# Patient Record
Sex: FEMALE | Race: WHITE | NOT HISPANIC OR LATINO | Employment: FULL TIME | URBAN - METROPOLITAN AREA
[De-identification: names, ages, dates, MRNs, and addresses within clinical notes are randomized per-mention and may not be internally consistent; named-entity substitution may affect disease eponyms.]

---

## 2017-01-20 ENCOUNTER — OFFICE VISIT (OUTPATIENT)
Dept: RETAIL CLINIC | Facility: CLINIC | Age: 42
End: 2017-01-20

## 2017-01-20 VITALS
RESPIRATION RATE: 14 BRPM | TEMPERATURE: 98.1 F | SYSTOLIC BLOOD PRESSURE: 117 MMHG | HEART RATE: 88 BPM | DIASTOLIC BLOOD PRESSURE: 82 MMHG | OXYGEN SATURATION: 99 %

## 2017-01-20 DIAGNOSIS — M54.40 ACUTE RIGHT-SIDED LOW BACK PAIN WITH SCIATICA, SCIATICA LATERALITY UNSPECIFIED: Primary | ICD-10-CM

## 2017-01-20 DIAGNOSIS — M54.17 LUMBOSACRAL RADICULOPATHY: ICD-10-CM

## 2017-01-20 PROCEDURE — 96372 THER/PROPH/DIAG INJ SC/IM: CPT | Performed by: FAMILY MEDICINE

## 2017-01-20 PROCEDURE — 99214 OFFICE O/P EST MOD 30 MIN: CPT | Performed by: FAMILY MEDICINE

## 2017-01-20 RX ORDER — HYDROCODONE BITARTRATE AND ACETAMINOPHEN 5; 325 MG/1; MG/1
TABLET ORAL
Qty: 30 TABLET | Refills: 0
Start: 2017-01-20 | End: 2017-04-22 | Stop reason: HOSPADM

## 2017-01-20 RX ORDER — CYCLOBENZAPRINE HCL 10 MG
TABLET ORAL
Qty: 60 TABLET | Refills: 0 | Status: ON HOLD
Start: 2017-01-20 | End: 2017-04-21

## 2017-01-20 RX ORDER — KETOROLAC TROMETHAMINE 30 MG/ML
60 INJECTION, SOLUTION INTRAMUSCULAR; INTRAVENOUS ONCE
Status: COMPLETED | OUTPATIENT
Start: 2017-01-20 | End: 2017-01-20

## 2017-01-20 RX ORDER — METHYLPREDNISOLONE ACETATE 80 MG/ML
80 INJECTION, SUSPENSION INTRA-ARTICULAR; INTRALESIONAL; INTRAMUSCULAR; SOFT TISSUE ONCE
Status: COMPLETED | OUTPATIENT
Start: 2017-01-20 | End: 2017-01-20

## 2017-01-20 RX ORDER — PREDNISONE 10 MG/1
TABLET ORAL
Qty: 63 TABLET | Refills: 0
Start: 2017-01-20 | End: 2017-03-02

## 2017-01-20 RX ADMIN — METHYLPREDNISOLONE ACETATE 80 MG: 80 INJECTION, SUSPENSION INTRA-ARTICULAR; INTRALESIONAL; INTRAMUSCULAR; SOFT TISSUE at 12:35

## 2017-01-20 RX ADMIN — KETOROLAC TROMETHAMINE 60 MG: 30 INJECTION, SOLUTION INTRAMUSCULAR; INTRAVENOUS at 12:33

## 2017-01-20 NOTE — PROGRESS NOTES
Subjective   Oksana Pillai is a 41 y.o. female presents for   Chief Complaint   Patient presents with   • Back Pain     pt recieving PT with moinimal results PT refering back for possible neuro surgical consult   .     History of Present Illness    Oksana has been having several weeks of low back pain that radiates down into her R buttock and down R leg all the way to the R foot.  She has been going to PT twice weekly for a while now without significant improvement.  She has recently had a miscarriage as well.  She was seen for back pain when she was about 4-5 weeks pregnant, so little could be done as far as medication goes.  PT and tylenol was about the only option while she was pregnant.  At her 9 week appointment US with her OB, no heartbeat was seen and the fetus was measuring at 5 weeks.  That weekend, she did in fact, start bleeding.  Follow up revealed no fetus, but still lining, so she was given cytotec.  Her physical therapist recommended her following up here to see if there was some kind of alternate therapy that she could do now that she was no longer pregnant.  She's been having a lot of trouble at work.  Cannot get comfortable.  Sitting makes the pain worse.  Standing makes it better, but feels like she constantly needs to shift her weight because the numbness and tingling down her leg get worse if standing still.  Has to stand during meetings and has been standing while typing at work.  Is considering short term disability vs FMLA until she can feel better.     The following portions of the patient's history were reviewed and updated as appropriate: allergies, current medications, past family history, past medical history, past social history, past surgical history and problem list.    Review of Systems   HENT: Negative.    Eyes: Negative.    Respiratory: Negative.    Cardiovascular: Negative.    Gastrointestinal: Negative.    Endocrine: Negative.    Genitourinary: Negative.    Musculoskeletal: Positive  for back pain. Negative for arthralgias, gait problem, joint swelling, myalgias, neck pain and neck stiffness.   Skin: Negative.    Allergic/Immunologic: Negative.    Neurological: Positive for weakness and numbness. Negative for dizziness, tremors, seizures, syncope, facial asymmetry, speech difficulty, light-headedness and headaches.   Hematological: Negative.    Psychiatric/Behavioral: Negative.        Objective   Vitals:    01/20/17 1139   BP: 117/82   Pulse: 88   Resp: 14   Temp: 98.1 °F (36.7 °C)   TempSrc: Oral   SpO2: 99%     There is no height or weight on file to calculate BMI.    Physical Exam   Constitutional: She is oriented to person, place, and time. She appears well-developed and well-nourished. No distress.   Lying on stomach on the exam table upon entering the room.  States that it relieves the pressure on her back.     HENT:   Head: Normocephalic and atraumatic.   Eyes: Conjunctivae are normal. Pupils are equal, round, and reactive to light. No scleral icterus.   Neck: Normal range of motion. Neck supple. No thyromegaly present.   Musculoskeletal: She exhibits tenderness (low L/S spine).   Rest of ms exam normal     Lymphadenopathy:     She has no cervical adenopathy.   Neurological: She is alert and oriented to person, place, and time. No cranial nerve deficit. Coordination normal.   Decreased sensation down the lateral side of the R leg     Skin: Skin is warm and dry. No rash noted.   Psychiatric: She has a normal mood and affect.   Nursing note and vitals reviewed.      Assessment/Plan   Oksana was seen today for back pain.    Diagnoses and all orders for this visit:    Acute right-sided low back pain with sciatica, sciatica laterality unspecified  -     methylPREDNISolone acetate (DEPO-medrol) injection 80 mg; Inject 1 mL into the shoulder, thigh, or buttocks 1 (One) Time.  -     ketorolac (TORADOL) injection 60 mg; Inject 60 mg into the shoulder, thigh, or buttocks 1 (One) Time.    Lumbosacral  radiculopathy  -     methylPREDNISolone acetate (DEPO-medrol) injection 80 mg; Inject 1 mL into the shoulder, thigh, or buttocks 1 (One) Time.  -     ketorolac (TORADOL) injection 60 mg; Inject 60 mg into the shoulder, thigh, or buttocks 1 (One) Time.    Other orders  -     predniSONE (DELTASONE) 10 MG tablet; 6 x3, 5 x3, 4 x3, 3x3, 2x3, 1x3  -     HYDROcodone-acetaminophen (NORCO) 5-325 MG per tablet; 1 po q6hrs prn severe pain  -     cyclobenzaprine (FLEXERIL) 10 MG tablet; 1/2-1 po TID prn spasm   Continue with PT per their recommendations   If worsening or not any better by next week, then order MRI L/S spine

## 2017-01-27 ENCOUNTER — TELEPHONE (OUTPATIENT)
Dept: RETAIL CLINIC | Facility: CLINIC | Age: 42
End: 2017-01-27

## 2017-01-27 DIAGNOSIS — M54.17 LUMBOSACRAL RADICULOPATHY: ICD-10-CM

## 2017-01-27 DIAGNOSIS — M54.41 ACUTE BILATERAL LOW BACK PAIN WITH RIGHT-SIDED SCIATICA: Primary | ICD-10-CM

## 2017-01-27 NOTE — TELEPHONE ENCOUNTER
PT WANTED TO UPDATE YOU ON HER PAIN: HAVING A GREAT DEAL OF PAIN DOWN BACK OF LEG AND KNEE ALSO BURNING SENSATION ON LITTLE TOE.   STILL SEEING PT.   WHAT IS NEXT STEP?      MRI SCHEDULED AT [facility]/[phone]  Tuesday ,JANUARY 31 WITH 6:20 PM ARRIVAL, 6:50 PM TESTING.      PA [id number]  THRU 2/28/17    PT NOTIFIED OF ABOVE APPT. 1/30/17 10:00 AM. PT VOICED UNDERSTANDING.

## 2017-02-01 ENCOUNTER — TELEPHONE (OUTPATIENT)
Dept: RETAIL CLINIC | Facility: CLINIC | Age: 42
End: 2017-02-01

## 2017-02-01 NOTE — TELEPHONE ENCOUNTER
APPT SCHEDULED WITH [facility], GISSELLE TREJO  Thursday, MARCH 2, 2017, 10:15 arrival with 10:30 appt.  [facility]    WILL BE SEND NEW PT PACKET, FILL OUT AND SEND BACK PRIOR TO APPT.    PT. INFORMED OF ABOVE INFO. PT VOICED UNDERSTANDING.  PT REQUESTED INTO TO BE EMAILED  TO HER.

## 2017-02-20 ENCOUNTER — TELEPHONE (OUTPATIENT)
Dept: RETAIL CLINIC | Facility: CLINIC | Age: 42
End: 2017-02-20

## 2017-03-02 ENCOUNTER — OFFICE VISIT (OUTPATIENT)
Dept: NEUROSURGERY | Facility: CLINIC | Age: 42
End: 2017-03-02

## 2017-03-02 VITALS
WEIGHT: 204 LBS | BODY MASS INDEX: 30.92 KG/M2 | DIASTOLIC BLOOD PRESSURE: 80 MMHG | SYSTOLIC BLOOD PRESSURE: 122 MMHG | HEIGHT: 68 IN

## 2017-03-02 DIAGNOSIS — M51.17 INTERVERTEBRAL DISC DISORDER WITH RADICULOPATHY OF LUMBOSACRAL REGION: Primary | ICD-10-CM

## 2017-03-02 PROCEDURE — 99244 OFF/OP CNSLTJ NEW/EST MOD 40: CPT | Performed by: NURSE PRACTITIONER

## 2017-03-02 NOTE — PROGRESS NOTES
"Subjective   Patient ID: Oksana Pillai is a 41 y.o. female is being seen for consultation today at the request of Dede Redd,* for low back pain that radiates into right leg. She also complains of numbness in the lower half of her right leg. She is currently taking Flexeril 10 PRN and Norco 5-325 mg PRN for pain.    Back Pain   This is a new problem. The current episode started more than 1 month ago (Pain started on December 10th ). The problem occurs intermittently. The problem has been gradually improving since onset. The pain is present in the lumbar spine and gluteal. The quality of the pain is described as aching and shooting. The pain radiates to the right knee, right thigh and right foot (Sensation of walking on a \"balled up sock\" undeneath right toes). The pain is at a severity of 3/10. The pain is mild. Worse during: depends on daily activity. The symptoms are aggravated by sitting, bending and standing. Associated symptoms include leg pain, numbness (R lateral calf) and weakness (dragging right foot). Pertinent negatives include no bowel incontinence. Bladder incontinence: Described as urgency and dribbling if waiting too long to urinate. She has tried muscle relaxant (PT, oral narcotics, steroids, dry needling) for the symptoms. The treatment provided moderate relief.       The following portions of the patient's history were reviewed and updated as appropriate: allergies, current medications, past family history, past medical history, past social history, past surgical history and problem list.    Review of Systems   Constitutional: Positive for activity change.   Gastrointestinal: Negative for bowel incontinence.   Genitourinary: Bladder incontinence: Described as urgency and dribbling if waiting too long to urinate.   Musculoskeletal: Positive for arthralgias and back pain.   Neurological: Positive for weakness (dragging right foot) and numbness (R lateral calf).   All other systems reviewed " and are negative.      Objective   Physical Exam   Constitutional: She is oriented to person, place, and time. She appears well-developed and well-nourished. She is cooperative. No distress.   HENT:   Head: Normocephalic and atraumatic.   Eyes: Conjunctivae are normal.   Neck: Normal range of motion. Neck supple.   Cardiovascular: Normal rate.    Pulmonary/Chest: Effort normal. No respiratory distress.   Abdominal: Soft. She exhibits no distension. There is no tenderness.   Musculoskeletal: Normal range of motion. She exhibits no edema.   Neurological: She is alert and oriented to person, place, and time. A sensory deficit (Increased sensitivity to light touch in the R calf.) is present. Gait normal. Coordination (Had difficulty with toe walking on the right) abnormal. GCS eye subscore is 4. GCS verbal subscore is 5. GCS motor subscore is 6.   Reflex Scores:       Tricep reflexes are 2+ on the right side and 2+ on the left side.       Bicep reflexes are 2+ on the right side and 2+ on the left side.       Brachioradialis reflexes are 2+ on the right side and 2+ on the left side.       Patellar reflexes are 2+ on the right side and 4+ on the left side.       Achilles reflexes are 0 on the right side and 2+ on the left side.  Increased tingling sensation with light touch in the R calf. Unable to toe walk on the right. SLR + on the right at 15 degrees. Negative Gibbs's; negative clonus.   Skin: Skin is warm and dry. No rash noted. She is not diaphoretic.   Psychiatric: She has a normal mood and affect. Her speech is normal. Thought content normal.   Vitals reviewed.    Neurologic Exam     Mental Status   Oriented to person, place, and time.   Speech: speech is normal   Level of consciousness: alert    Motor Exam   Muscle bulk: normal  Overall muscle tone: normal    Strength   Strength 5/5 except as noted.   Right strength: 5-/5 strength in the right gastroc.     Sensory Exam   Right arm light touch: normal  Left arm  light touch: normal  Right leg light touch: Increased sensitivity to ligth touch in the right calf.  Left leg light touch: normal    Gait, Coordination, and Reflexes     Gait  Gait: normal    Reflexes   Right brachioradialis: 2+  Left brachioradialis: 2+  Right biceps: 2+  Left biceps: 2+  Right triceps: 2+  Left triceps: 2+  Right patellar: 2+  Left patellar: 4+  Right achilles: 0  Left achilles: 2+  Right Gibbs: absent  Left Gibbs: absent  Right ankle clonus: absent  Left ankle clonus: absent       + SLR on the right at 15 degrees       Assessment/Plan   Independent Review of Radiographic Studies:      The MRI images of the lumbar spine performed without contrast January 31, 2017 today in the office with both patient and Dr. Montana.  The MRI shows a right paracentral disc extrusion at L5-S1 with severe right lateral recess stenosis and displacement of the right S1 nerve root.  The disc herniation measures 9010 mm in length and 5-6 mm in width.    Medical Decision Making:       Ms. Pillai was seen today for a neurosurgical opinion at the request of Dr. Dede Redd.  Notes from today's visit will be forwarded upon completion.  Ms. Pillai began having the above symptoms December 10 of 2016.  The pain was severe and incapacitating at that time.  The pain involves the right low back just above the right buttock, the right buttock, right posterior thigh, right posterior lateral calf.  She was practically immobile during that time.  She has been undergoing physical therapy, dry needling and has noticed some improvement however she continues to have shooting pain on occasion in the right leg, paresthesias and numbness in the right calf, and weakness in the right leg.  She is also noticed some difficulty with bladder elimination.  She states that if she waits too long to urinate she can become in incontinence with dribbling.  She is to have severe pain with sitting on the toilet but that has gotten better.    I  reviewed the above images, the history and exam findings with Dr. Montana.  He has also spoken with the patient about the imaging and exam findings.    Dr. Montana explained that the disc herniation is quite sizable and certainly explains the history of pain in the right posterior thigh and calf.  After a course of time, usually the pain will diminish but weakness can persist and become permanent.  She does have weakness on today's exam.  Dr. Montana explained that a prolonged course of nerve compression can lead to irreversible weakness in the right leg.  The patient voiced understanding.    After Dr. Montana left the room, the patient expressed to me that her intention was to have Dr. Ojeda as her surgeon if it should be required.  She also expressed desire to proceed with at least one lumbar epidural steroid injection to see if her symptoms improve.  We will arrange for the epidural and a follow-up with Dr. Ojeda.  She has any worsening pain or worsening weakness in her lower extremities, urinary or bowell incontinence she was instructed to call the office.    Oksana was seen today for back pain.    Diagnoses and all orders for this visit:    Intervertebral disc disorder with radiculopathy of lumbosacral region  -     Epidural Block      Return in about 3 weeks (around 3/23/2017).

## 2017-03-08 ENCOUNTER — TELEPHONE (OUTPATIENT)
Dept: NEUROSURGERY | Facility: CLINIC | Age: 42
End: 2017-03-08

## 2017-03-08 ENCOUNTER — OFFICE VISIT (OUTPATIENT)
Dept: NEUROSURGERY | Facility: CLINIC | Age: 42
End: 2017-03-08

## 2017-03-08 VITALS
DIASTOLIC BLOOD PRESSURE: 82 MMHG | SYSTOLIC BLOOD PRESSURE: 117 MMHG | WEIGHT: 204 LBS | BODY MASS INDEX: 30.92 KG/M2 | HEIGHT: 68 IN | HEART RATE: 70 BPM

## 2017-03-08 DIAGNOSIS — M51.17 INTERVERTEBRAL DISC DISORDER WITH RADICULOPATHY OF LUMBOSACRAL REGION: Primary | ICD-10-CM

## 2017-03-08 PROCEDURE — 99213 OFFICE O/P EST LOW 20 MIN: CPT | Performed by: NEUROLOGICAL SURGERY

## 2017-03-08 RX ORDER — GABAPENTIN 100 MG/1
100 CAPSULE ORAL 2 TIMES DAILY
Qty: 60 CAPSULE | Refills: 3 | Status: SHIPPED | OUTPATIENT
Start: 2017-03-08 | End: 2017-05-05 | Stop reason: SDUPTHER

## 2017-03-08 NOTE — PATIENT INSTRUCTIONS
Obesity  Obesity is defined as having too much total body fat and a body mass index (BMI) of 30 or more. BMI is an estimate of body fat and is calculated from your height and weight. BMI is typically calculated by your health care provider during regular wellness visits. Obesity happens when you consume more calories than you can burn by exercising or performing daily physical tasks. Prolonged obesity can cause major illnesses or emergencies, such as:  · Stroke.  · Heart disease.  · Diabetes.  · Cancer.  · Arthritis.  · High blood pressure (hypertension).  · High cholesterol.  · Sleep apnea.  · Erectile dysfunction.  · Infertility problems.  CAUSES   · Regularly eating unhealthy foods.  · Physical inactivity.  · Certain disorders, such as an underactive thyroid (hypothyroidism), Cushing's syndrome, and polycystic ovarian syndrome.  · Certain medicines, such as steroids, some depression medicines, and antipsychotics.  · Genetics.  · Lack of sleep.  DIAGNOSIS  A health care provider can diagnose obesity after calculating your BMI. Obesity will be diagnosed if your BMI is 30 or higher.  There are other methods of measuring obesity levels. Some other methods include measuring your skinfold thickness, your waist circumference, and comparing your hip circumference to your waist circumference.  TREATMENT   A healthy treatment program includes some or all of the following:  · Long-term dietary changes.  · Exercise and physical activity.  · Behavioral and lifestyle changes.  · Medicine only under the supervision of your health care provider. Medicines may help, but only if they are used with diet and exercise programs.  If your BMI is 40 or higher, your health care provider may recommend specialized surgery or programs to help with weight loss.  An unhealthy treatment program includes:  · Fasting.  · Fad diets.  · Supplements and drugs.  These choices do not succeed in long-term weight control.  HOME CARE  INSTRUCTIONS  · Exercise and perform physical activity as directed by your health care provider. To increase physical activity, try the following:    Use stairs instead of elevators.    Park farther away from store entrances.    Garden, bike, or walk instead of watching television or using the computer.  · Eat healthy, low-calorie foods and drinks on a regular basis. Eat more fruits and vegetables. Use low-calorie cookbooks or take healthy cooking classes.  · Limit fast food, sweets, and processed snack foods.  · Eat smaller portions.  · Keep a daily journal of everything you eat. There are many free websites to help you with this. It may be helpful to measure your foods so you can determine if you are eating the correct portion sizes.  · Avoid drinking alcohol. Drink more water and drinks without calories.  · Take vitamins and supplements only as recommended by your health care provider.  · Weight-loss support groups, registered dietitians, counselors, and stress reduction education can also be very helpful.  SEEK IMMEDIATE MEDICAL CARE IF:  · You have chest pain or tightness.  · You have trouble breathing or feel short of breath.  · You have weakness or leg numbness.  · You feel confused or have trouble talking.  · You have sudden changes in your vision.     This information is not intended to replace advice given to you by your health care provider. Make sure you discuss any questions you have with your health care provider.     Document Released: 01/25/2006 Document Revised: 01/08/2016 Document Reviewed: 10/05/2016  [employer] Interactive Patient Education ©2016 [employer]

## 2017-03-08 NOTE — PROGRESS NOTES
Subjective   Patient ID: Oksana Pillai is a 41 y.o. female is here today for follow-up of back pain.    At the patient's last visit, she was encouraged to proceed with her third MITCH and followup today with Dr. Ojeda.    Today, the patient notes that she was originally scheduled for MTICH today, but wanted to discuss MITCH with Dr. Ojeda.    The patient notes that her pain symptoms are related to her movements and activity.  She notes that some movements in PT will increase.  She notes persistent numbness in her right toes.    Back Pain   This is a chronic problem. The current episode started more than 1 month ago. The problem occurs daily. The problem has been gradually improving since onset. Pertinent negatives include no fever.       The following portions of the patient's history were reviewed and updated as appropriate: allergies, current medications, past family history, past medical history, past social history, past surgical history and problem list.    Review of Systems   Constitutional: Negative for fever.   Musculoskeletal: Positive for back pain.   Neurological: Negative for syncope.   All other systems reviewed and are negative.    The patient is with her .  She is generally healthy.  She's had mild off and on low back pain over the last year or 2.  About 3 or 4 months ago she began developing some severe right buttock and leg pain that was aggravated by prolonged sitting.  In a car.  She is known to have a right L5-S1 herniated disc which is quite large.  She has gone through therapy and there has been some improvement in the pain.  But she does have some slight weakness in the right gastrocnemius 4+ over 5.  It has not worsened since she noticed it.  She was supposed to have a epidural block today but canceled that.  We talked about her options.  She was seen by my partner a few weeks ago and surgery was discussed.  I think it's possible that she may end up needing it but the weakness has not  been going on for that long and I think it's reasonable to give it a little bit more time, at least another 4 weeks.  If there is no change in the weakness, even if the pain is better, then a right L5-S1 microdiscectomy would probably be the best option.  But she wanted to try and give as much time an opportunity to see if this can be managed nonoperatively and I think that's reasonable.  Since her pain is better, we'll hold off on the blocks.  We'll renew her therapy and try gabapentin and see her in one month's.    Objective   Physical Exam   Constitutional: She is oriented to person, place, and time. She appears well-developed and well-nourished.   HENT:   Head: Normocephalic and atraumatic.   Eyes: Conjunctivae and EOM are normal. Pupils are equal, round, and reactive to light.   Fundoscopic exam:       The right eye shows no papilledema. The right eye shows venous pulsations.        The left eye shows no papilledema. The left eye shows venous pulsations.   Neck: Carotid bruit is not present.   Neurological: She is oriented to person, place, and time. She has a normal Finger-Nose-Finger Test and a normal Heel to Shin Test. Gait normal.   Reflex Scores:       Tricep reflexes are 2+ on the right side and 2+ on the left side.       Bicep reflexes are 2+ on the right side and 2+ on the left side.       Brachioradialis reflexes are 2+ on the right side and 2+ on the left side.       Patellar reflexes are 2+ on the right side and 2+ on the left side.       Achilles reflexes are 2+ on the right side and 2+ on the left side.  Psychiatric: Her speech is normal.     Neurologic Exam     Mental Status   Oriented to person, place, and time.   Registration of memory: Good recent and remote memory.   Attention: normal. Concentration: normal.   Speech: speech is normal   Level of consciousness: alert  Knowledge: consistent with education.     Cranial Nerves     CN II   Visual fields full to confrontation.   Visual acuity:  normal    CN III, IV, VI   Pupils are equal, round, and reactive to light.  Extraocular motions are normal.     CN V   Facial sensation intact.   Right corneal reflex: normal  Left corneal reflex: normal    CN VII   Facial expression full, symmetric.   Right facial weakness: none  Left facial weakness: none    CN VIII   Hearing: intact    CN IX, X   Palate: symmetric    CN XI   Right sternocleidomastoid strength: normal  Left sternocleidomastoid strength: normal    CN XII   Tongue: not atrophic  Tongue deviation: none    Motor Exam   Muscle bulk: normal  Right arm tone: normal  Left arm tone: normal  Right leg tone: normal  Left leg tone: normal    Strength   Strength 5/5 except as noted.   Right strength: 4+/5 strength in the right gastrocnemius       Positive straight leg raising sign on the right     Sensory Exam   Light touch normal.     Gait, Coordination, and Reflexes     Gait  Gait: normal    Coordination   Finger to nose coordination: normal  Heel to shin coordination: normal    Reflexes   Right brachioradialis: 2+  Left brachioradialis: 2+  Right biceps: 2+  Left biceps: 2+  Right triceps: 2+  Left triceps: 2+  Right patellar: 2+  Left patellar: 2+  Right achilles: 2+  Left achilles: 2+  Right : 2+  Left : 2+      Assessment/Plan   Independent Review of Radiographic Studies:    I reviewed the MRI done to/1/17 which does show a right L5-S1 herniated disc with root compression quite large.  I agree with the report.      Medical Decision Making:    Her pain is better.  Her weakness is slight.  I do share of the  concern that she might go on to need surgery, but I don't think it's unreasonable to give it just a little bit more time.  We'll continue therapy and start gabapentin at 100 mg twice a day and regroup in one month.  If there is no sign of improvement, I would move forward with a right L5-S1 microdiscectomy.  If there is, there would be the option to continue nonoperative treatment.      Oksana  was seen today for back pain.    Diagnoses and all orders for this visit:    Intervertebral disc disorder with radiculopathy of lumbosacral region  -     Ambulatory Referral to Physical Therapy Evaluate and treat    Other orders  -     gabapentin (NEURONTIN) 100 MG capsule; Take 1 capsule by mouth 2 (Two) Times a Day.    Return in about 4 weeks (around 4/5/2017).    Body mass index is 31.02 kg/(m^2).

## 2017-03-09 ENCOUNTER — TELEPHONE (OUTPATIENT)
Dept: NEUROSURGERY | Facility: CLINIC | Age: 42
End: 2017-03-09

## 2017-03-24 ENCOUNTER — TELEPHONE (OUTPATIENT)
Dept: NEUROSURGERY | Facility: CLINIC | Age: 42
End: 2017-03-24

## 2017-04-05 ENCOUNTER — OFFICE VISIT (OUTPATIENT)
Dept: NEUROSURGERY | Facility: CLINIC | Age: 42
End: 2017-04-05

## 2017-04-05 VITALS
HEIGHT: 68 IN | BODY MASS INDEX: 30.92 KG/M2 | WEIGHT: 204 LBS | DIASTOLIC BLOOD PRESSURE: 79 MMHG | HEART RATE: 76 BPM | SYSTOLIC BLOOD PRESSURE: 116 MMHG

## 2017-04-05 DIAGNOSIS — M51.17 INTERVERTEBRAL DISC DISORDER WITH RADICULOPATHY OF LUMBOSACRAL REGION: Primary | ICD-10-CM

## 2017-04-05 DIAGNOSIS — M62.81 CALF MUSCLE WEAKNESS: ICD-10-CM

## 2017-04-05 PROCEDURE — 99214 OFFICE O/P EST MOD 30 MIN: CPT | Performed by: NEUROLOGICAL SURGERY

## 2017-04-05 NOTE — PROGRESS NOTES
Subjective   Patient ID: Oksana Pillai is a 41 y.o. female is here today for follow-up of back pain.    At the patient's last visit, she was encouraged to continue her therapy and gabapentin at 100 mg BID.    Today, the patient notes that she continues in therapy once a week.  She notes that she continues on the gabapentin 100 mg BID.  She notes that she cannot tell much of a change in her symptoms.  She notes that she still has trouble with activity.    She notes that when she started the gabapentin, she noticed increased headaches, but these eventually resolved.     Back Pain   This is a chronic problem. The current episode started more than 1 month ago. The problem occurs daily. The problem has been gradually improving since onset. Pertinent negatives include no fever.       The following portions of the patient's history were reviewed and updated as appropriate: allergies, current medications, past family history, past medical history, past social history, past surgical history and problem list.    Review of Systems   Constitutional: Negative for fever.   Musculoskeletal: Positive for back pain.   Neurological: Negative for syncope.   All other systems reviewed and are negative.    He is with her .  PT and gabapentin really did not help.  The weakness has not improved.  She has a fairly substantial right L5-S1 disc.  Probably a free fragment.  This is been going on now for about 3-1/2 almost 4 months.  I think it's time to go ahead and move forward with a right L5-S1 microdiscectomy.  We discussed this at length and she wants to move forward with it.  She still has about 4 out of 5 Strength on the right.    Objective   Physical Exam   Constitutional: She is oriented to person, place, and time. She appears well-developed and well-nourished.   HENT:   Head: Normocephalic and atraumatic.   Eyes: Conjunctivae and EOM are normal. Pupils are equal, round, and reactive to light.   Fundoscopic exam:       The right  eye shows no papilledema. The right eye shows venous pulsations.        The left eye shows no papilledema. The left eye shows venous pulsations.   Neck: Carotid bruit is not present.   Neurological: She is oriented to person, place, and time. She has a normal Finger-Nose-Finger Test and a normal Heel to Shin Test. Gait normal.   Reflex Scores:       Tricep reflexes are 2+ on the right side and 2+ on the left side.       Bicep reflexes are 2+ on the right side and 2+ on the left side.       Brachioradialis reflexes are 2+ on the right side and 2+ on the left side.       Patellar reflexes are 2+ on the right side and 2+ on the left side.       Achilles reflexes are 2+ on the right side and 2+ on the left side.  Psychiatric: Her speech is normal.     Neurologic Exam     Mental Status   Oriented to person, place, and time.   Registration of memory: Good recent and remote memory.   Attention: normal. Concentration: normal.   Speech: speech is normal   Level of consciousness: alert  Knowledge: consistent with education.     Cranial Nerves     CN II   Visual fields full to confrontation.   Visual acuity: normal    CN III, IV, VI   Pupils are equal, round, and reactive to light.  Extraocular motions are normal.     CN V   Facial sensation intact.   Right corneal reflex: normal  Left corneal reflex: normal    CN VII   Facial expression full, symmetric.   Right facial weakness: none  Left facial weakness: none    CN VIII   Hearing: intact    CN IX, X   Palate: symmetric    CN XI   Right sternocleidomastoid strength: normal  Left sternocleidomastoid strength: normal    CN XII   Tongue: not atrophic  Tongue deviation: none    Motor Exam   Muscle bulk: normal  Right arm tone: normal  Left arm tone: normal  Right leg tone: normal  Left leg tone: normal    Strength   Strength 5/5 except as noted.   Right gastroc: 4/5    Sensory Exam   Light touch normal.     Gait, Coordination, and Reflexes     Gait  Gait: normal    Coordination    Finger to nose coordination: normal  Heel to shin coordination: normal    Reflexes   Right brachioradialis: 2+  Left brachioradialis: 2+  Right biceps: 2+  Left biceps: 2+  Right triceps: 2+  Left triceps: 2+  Right patellar: 2+  Left patellar: 2+  Right achilles: 2+  Left achilles: 2+  Right : 2+  Left : 2+      Assessment/Plan   Independent Review of Radiographic Studies:    I reviewed the MRI again which showed a fairly substantial right L5-S1 herniated disc.      Medical Decision Making:    I described and recommended an outpatient right L5/S1 Metrx microdiscectomy. The goal of surgery is relief of radiating leg pain, and improvement of numbness, tingling, and weakness. This will not help or hinder midline low back pain. The risks include, but are not limited to, infection, hemorrhage requiring transfusion or reoperation, CSF leak requiring reoperation, incomplete relief of symptoms, potential need for additional surgery in the future, stroke, paralysis, coma, and death. The patient agrees to proceed.     Oksana was seen today for back pain.    Diagnoses and all orders for this visit:    Intervertebral disc disorder with radiculopathy of lumbosacral region  -     Case request; Standing  -     Case request    Calf muscle weakness  -     Case request; Standing  -     Case request    No Follow-up on file.

## 2017-04-06 ENCOUNTER — TRANSCRIBE ORDERS (OUTPATIENT)
Dept: NEUROSURGERY | Facility: CLINIC | Age: 42
End: 2017-04-06

## 2017-04-14 ENCOUNTER — APPOINTMENT (OUTPATIENT)
Dept: PREADMISSION TESTING | Facility: HOSPITAL | Age: 42
End: 2017-04-14

## 2017-04-14 VITALS
OXYGEN SATURATION: 100 % | DIASTOLIC BLOOD PRESSURE: 78 MMHG | WEIGHT: 205 LBS | BODY MASS INDEX: 32.18 KG/M2 | SYSTOLIC BLOOD PRESSURE: 133 MMHG | HEIGHT: 67 IN | RESPIRATION RATE: 18 BRPM | HEART RATE: 73 BPM | TEMPERATURE: 98.1 F

## 2017-04-14 LAB
ANION GAP SERPL CALCULATED.3IONS-SCNC: 13.1 MMOL/L
APTT PPP: 26.9 SECONDS (ref 22.7–35.4)
BACTERIA UR QL AUTO: NORMAL /HPF
BASOPHILS # BLD AUTO: 0.01 10*3/MM3 (ref 0–0.2)
BASOPHILS NFR BLD AUTO: 0.2 % (ref 0–1.5)
BILIRUB UR QL STRIP: NEGATIVE
BUN BLD-MCNC: 16 MG/DL (ref 6–20)
BUN/CREAT SERPL: 16.7 (ref 7–25)
CALCIUM SPEC-SCNC: 9.4 MG/DL (ref 8.6–10.5)
CHLORIDE SERPL-SCNC: 103 MMOL/L (ref 98–107)
CLARITY UR: CLEAR
CO2 SERPL-SCNC: 26.9 MMOL/L (ref 22–29)
COLOR UR: YELLOW
CREAT BLD-MCNC: 0.96 MG/DL (ref 0.57–1)
DEPRECATED RDW RBC AUTO: 42.4 FL (ref 37–54)
EOSINOPHIL # BLD AUTO: 0.08 10*3/MM3 (ref 0–0.7)
EOSINOPHIL NFR BLD AUTO: 1.3 % (ref 0.3–6.2)
ERYTHROCYTE [DISTWIDTH] IN BLOOD BY AUTOMATED COUNT: 12.4 % (ref 11.7–13)
GFR SERPL CREATININE-BSD FRML MDRD: 65 ML/MIN/1.73
GLUCOSE BLD-MCNC: 85 MG/DL (ref 65–99)
GLUCOSE UR STRIP-MCNC: NEGATIVE MG/DL
HCT VFR BLD AUTO: 41.9 % (ref 35.6–45.5)
HGB BLD-MCNC: 14 G/DL (ref 11.9–15.5)
HGB UR QL STRIP.AUTO: NEGATIVE
HYALINE CASTS UR QL AUTO: NORMAL /LPF
IMM GRANULOCYTES # BLD: 0 10*3/MM3 (ref 0–0.03)
IMM GRANULOCYTES NFR BLD: 0 % (ref 0–0.5)
INR PPP: 0.98 (ref 0.9–1.1)
KETONES UR QL STRIP: NEGATIVE
LEUKOCYTE ESTERASE UR QL STRIP.AUTO: NEGATIVE
LYMPHOCYTES # BLD AUTO: 1.98 10*3/MM3 (ref 0.9–4.8)
LYMPHOCYTES NFR BLD AUTO: 31.5 % (ref 19.6–45.3)
MCH RBC QN AUTO: 31.4 PG (ref 26.9–32)
MCHC RBC AUTO-ENTMCNC: 33.4 G/DL (ref 32.4–36.3)
MCV RBC AUTO: 93.9 FL (ref 80.5–98.2)
MONOCYTES # BLD AUTO: 0.43 10*3/MM3 (ref 0.2–1.2)
MONOCYTES NFR BLD AUTO: 6.8 % (ref 5–12)
NEUTROPHILS # BLD AUTO: 3.78 10*3/MM3 (ref 1.9–8.1)
NEUTROPHILS NFR BLD AUTO: 60.2 % (ref 42.7–76)
NITRITE UR QL STRIP: NEGATIVE
PH UR STRIP.AUTO: 5.5 [PH] (ref 5–8)
PLATELET # BLD AUTO: 200 10*3/MM3 (ref 140–500)
PMV BLD AUTO: 11.8 FL (ref 6–12)
POTASSIUM BLD-SCNC: 4.1 MMOL/L (ref 3.5–5.2)
PROT UR QL STRIP: NEGATIVE
PROTHROMBIN TIME: 12.6 SECONDS (ref 11.7–14.2)
RBC # BLD AUTO: 4.46 10*6/MM3 (ref 3.9–5.2)
RBC # UR: NORMAL /HPF
REF LAB TEST METHOD: NORMAL
SODIUM BLD-SCNC: 143 MMOL/L (ref 136–145)
SP GR UR STRIP: 1.02 (ref 1–1.03)
SQUAMOUS #/AREA URNS HPF: NORMAL /HPF
UROBILINOGEN UR QL STRIP: NORMAL
WBC NRBC COR # BLD: 6.28 10*3/MM3 (ref 4.5–10.7)
WBC UR QL AUTO: NORMAL /HPF

## 2017-04-14 PROCEDURE — 36415 COLL VENOUS BLD VENIPUNCTURE: CPT

## 2017-04-14 PROCEDURE — 80048 BASIC METABOLIC PNL TOTAL CA: CPT

## 2017-04-14 PROCEDURE — 85610 PROTHROMBIN TIME: CPT

## 2017-04-14 PROCEDURE — 85730 THROMBOPLASTIN TIME PARTIAL: CPT

## 2017-04-14 PROCEDURE — 81001 URINALYSIS AUTO W/SCOPE: CPT

## 2017-04-14 PROCEDURE — 85025 COMPLETE CBC W/AUTO DIFF WBC: CPT

## 2017-04-14 NOTE — DISCHARGE INSTRUCTIONS
Take the following medications the morning of surgery with a small sip of water:        General Instructions:  • Do not eat solid food after midnight the night before surgery.  • You may drink clear liquids day of surgery but must stop at least one hour before your hospital arrival time.  • It is beneficial for you to have a clear drink that contains carbohydrates the day of surgery.  We suggest a 20 ounce bottle of Gatorade or Powerade for non-diabetic patients or a 20 ounce bottle of G2 or Powerade Zero for diabetic patients. (Pediatric patients, are not advised to drink a 20 ounce carbohydrate drink)    Clear liquids are liquids you can see through.  Nothing red in color.     Plain water                               Sports drinks  Sodas                                   Gelatin (Jell-O)  Fruit juices without pulp such as white grape juice and apple juice  Popsicles that contain no fruit or yogurt  Tea or coffee (no cream or milk added)  Gatorade / Powerade  G2 / Powerade Zero    • Infants may have breast milk up to four hours before surgery.  • Infants drinking formula may drink formula up to six hours before surgery.   • Patients who avoid smoking, chewing tobacco and alcohol for 4 weeks prior to surgery have a reduced risk of post-operative complications.  Quit smoking as many days before surgery as you can.  • Do not smoke, use chewing tobacco or drink alcohol the day of surgery.   • If applicable bring your C-PAP/ BI-PAP machine.  • Bring any papers given to you in the doctor’s office.  • Wear clean comfortable clothes and socks.  • Do not wear contact lenses or make-up.  Bring a case for your glasses.   • Bring crutches or walker if applicable.  • Leave all other valuables and jewelry at home.  • The Pre-Admission Testing nurse will instruct you to bring medications if unable to obtain an accurate list in Pre-Admission Testing.        If you were given a blood bank ID arm band remember to bring it with you  the day of surgery.    Preventing a Surgical Site Infection:  • For 2 to 3 days before surgery, avoid shaving with a razor because the razor can irritate skin and make it easier to develop an infection.  • The night prior to surgery sleep in a clean bed with clean clothing.  Do not allow pets to sleep with you.  • Shower on the morning of surgery using a fresh bar of anti-bacterial soap (such as Dial) and clean washcloth.  Dry with a clean towel and dress in clean clothing.  • Ask your surgeon if you will be receiving antibiotics prior to surgery.  • Make sure you, your family, and all healthcare providers clean their hands with soap and water or an alcohol based hand  before caring for you or your wound.    Day of surgery:  Upon arrival, a Pre-op nurse and Anesthesiologist will review your health history, obtain vital signs, and answer questions you may have.  The only belongings needed at this time will be your home medications and if applicable your C-PAP/BI-PAP machine.  If you are staying overnight your family can leave the rest of your belongings in the car and bring them to your room later.  A Pre-op nurse will start an IV and you may receive medication in preparation for surgery, including something to help you relax.  Your family will be able to see you in the Pre-op area.  While you are in surgery your family should notify the waiting room  if they leave the waiting room area and provide a contact phone number.    Please be aware that surgery does come with discomfort.  We want to make every effort to control your discomfort so please discuss any uncontrolled symptoms with your nurse.   Your doctor will most likely have prescribed pain medications.      If you are going home after surgery you will receive individualized written care instructions before being discharged.  A responsible adult must drive you to and from the hospital on the day of your surgery and stay with you for 24  hours.    If you are staying overnight following surgery, you will be transported to your hospital room following the recovery period.  [facility] has all private rooms.    If you have any questions please call Pre-Admission Testing at [phone].  Deductibles and co-payments are collected on the day of service. Please be prepared to pay the required co-pay, deductible or deposit on the day of service as defined by your plan.

## 2017-04-21 ENCOUNTER — ANESTHESIA (OUTPATIENT)
Dept: PERIOP | Facility: HOSPITAL | Age: 42
End: 2017-04-21

## 2017-04-21 ENCOUNTER — APPOINTMENT (OUTPATIENT)
Dept: GENERAL RADIOLOGY | Facility: HOSPITAL | Age: 42
End: 2017-04-21

## 2017-04-21 ENCOUNTER — HOSPITAL ENCOUNTER (OUTPATIENT)
Facility: HOSPITAL | Age: 42
Setting detail: OBSERVATION
Discharge: HOME | End: 2017-04-22
Attending: NEUROLOGICAL SURGERY | Admitting: NEUROLOGICAL SURGERY

## 2017-04-21 ENCOUNTER — ANESTHESIA EVENT (OUTPATIENT)
Dept: PERIOP | Facility: HOSPITAL | Age: 42
End: 2017-04-21

## 2017-04-21 PROBLEM — Z09 SURGERY FOLLOW-UP EXAMINATION: Status: ACTIVE | Noted: 2017-04-21

## 2017-04-21 LAB
B-HCG UR QL: NEGATIVE
INTERNAL NEGATIVE CONTROL: NEGATIVE
INTERNAL POSITIVE CONTROL: POSITIVE
Lab: NORMAL

## 2017-04-21 PROCEDURE — G0378 HOSPITAL OBSERVATION PER HR: HCPCS

## 2017-04-21 PROCEDURE — 63030 LAMOT DCMPRN NRV RT 1 LMBR: CPT | Performed by: NEUROLOGICAL SURGERY

## 2017-04-21 PROCEDURE — 25010000002 NEOSTIGMINE 10 MG/10ML SOLUTION: Performed by: NURSE ANESTHETIST, CERTIFIED REGISTERED

## 2017-04-21 PROCEDURE — 25010000002 METHOCARBAMOL 1000 MG/10ML SOLUTION 10 ML VIAL: Performed by: NEUROLOGICAL SURGERY

## 2017-04-21 PROCEDURE — 25010000002 FENTANYL CITRATE (PF) 100 MCG/2ML SOLUTION: Performed by: NURSE ANESTHETIST, CERTIFIED REGISTERED

## 2017-04-21 PROCEDURE — 25010000002 PROPOFOL 10 MG/ML EMULSION: Performed by: NURSE ANESTHETIST, CERTIFIED REGISTERED

## 2017-04-21 PROCEDURE — 25010000003 CEFAZOLIN IN DEXTROSE 2-4 GM/100ML-% SOLUTION: Performed by: NEUROLOGICAL SURGERY

## 2017-04-21 PROCEDURE — 25010000002 DEXAMETHASONE PER 1 MG: Performed by: NURSE ANESTHETIST, CERTIFIED REGISTERED

## 2017-04-21 PROCEDURE — 25010000002 ONDANSETRON PER 1 MG: Performed by: NURSE ANESTHETIST, CERTIFIED REGISTERED

## 2017-04-21 PROCEDURE — 25010000002 HYDROMORPHONE PER 4 MG: Performed by: NURSE ANESTHETIST, CERTIFIED REGISTERED

## 2017-04-21 PROCEDURE — 25010000002 MIDAZOLAM PER 1 MG: Performed by: ANESTHESIOLOGY

## 2017-04-21 PROCEDURE — 25010000002 METHYLPREDNISOLONE PER 80 MG: Performed by: NEUROLOGICAL SURGERY

## 2017-04-21 PROCEDURE — 25010000002 PHENYLEPHRINE PER 1 ML: Performed by: NURSE ANESTHETIST, CERTIFIED REGISTERED

## 2017-04-21 PROCEDURE — 99024 POSTOP FOLLOW-UP VISIT: CPT | Performed by: PHYSICIAN ASSISTANT

## 2017-04-21 RX ORDER — LIDOCAINE HYDROCHLORIDE 10 MG/ML
5 INJECTION, SOLUTION EPIDURAL; INFILTRATION; INTRACAUDAL; PERINEURAL ONCE
Status: DISCONTINUED | OUTPATIENT
Start: 2017-04-21 | End: 2017-04-21 | Stop reason: HOSPADM

## 2017-04-21 RX ORDER — DOCUSATE SODIUM 100 MG/1
100 CAPSULE, LIQUID FILLED ORAL 2 TIMES DAILY
Status: DISCONTINUED | OUTPATIENT
Start: 2017-04-21 | End: 2017-04-22 | Stop reason: HOSPADM

## 2017-04-21 RX ORDER — PROMETHAZINE HYDROCHLORIDE 25 MG/1
25 SUPPOSITORY RECTAL ONCE AS NEEDED
Status: DISCONTINUED | OUTPATIENT
Start: 2017-04-21 | End: 2017-04-21

## 2017-04-21 RX ORDER — HYDROCODONE BITARTRATE AND ACETAMINOPHEN 7.5; 325 MG/1; MG/1
2 TABLET ORAL EVERY 6 HOURS PRN
Qty: 40 TABLET | Refills: 0 | Status: SHIPPED | OUTPATIENT
Start: 2017-04-21 | End: 2017-05-05 | Stop reason: SDUPTHER

## 2017-04-21 RX ORDER — PROPOFOL 10 MG/ML
VIAL (ML) INTRAVENOUS AS NEEDED
Status: DISCONTINUED | OUTPATIENT
Start: 2017-04-21 | End: 2017-04-21 | Stop reason: SURG

## 2017-04-21 RX ORDER — SODIUM CHLORIDE 0.9 % (FLUSH) 0.9 %
1-10 SYRINGE (ML) INJECTION AS NEEDED
Status: DISCONTINUED | OUTPATIENT
Start: 2017-04-21 | End: 2017-04-22 | Stop reason: HOSPADM

## 2017-04-21 RX ORDER — ROCURONIUM BROMIDE 10 MG/ML
INJECTION, SOLUTION INTRAVENOUS AS NEEDED
Status: DISCONTINUED | OUTPATIENT
Start: 2017-04-21 | End: 2017-04-21 | Stop reason: SURG

## 2017-04-21 RX ORDER — OXYCODONE AND ACETAMINOPHEN 7.5; 325 MG/1; MG/1
1 TABLET ORAL ONCE AS NEEDED
Status: DISCONTINUED | OUTPATIENT
Start: 2017-04-21 | End: 2017-04-21

## 2017-04-21 RX ORDER — CETIRIZINE HYDROCHLORIDE 10 MG/1
10 TABLET ORAL DAILY
Status: DISCONTINUED | OUTPATIENT
Start: 2017-04-21 | End: 2017-04-22 | Stop reason: HOSPADM

## 2017-04-21 RX ORDER — CYCLOBENZAPRINE HCL 10 MG
10 TABLET ORAL 3 TIMES DAILY PRN
Status: DISCONTINUED | OUTPATIENT
Start: 2017-04-21 | End: 2017-04-22 | Stop reason: HOSPADM

## 2017-04-21 RX ORDER — HYDROCODONE BITARTRATE AND ACETAMINOPHEN 5; 325 MG/1; MG/1
1 TABLET ORAL EVERY 6 HOURS PRN
Status: DISCONTINUED | OUTPATIENT
Start: 2017-04-21 | End: 2017-04-22 | Stop reason: HOSPADM

## 2017-04-21 RX ORDER — GABAPENTIN 100 MG/1
100 CAPSULE ORAL 2 TIMES DAILY
Status: DISCONTINUED | OUTPATIENT
Start: 2017-04-21 | End: 2017-04-22 | Stop reason: HOSPADM

## 2017-04-21 RX ORDER — PROMETHAZINE HYDROCHLORIDE 25 MG/1
25 TABLET ORAL ONCE AS NEEDED
Status: DISCONTINUED | OUTPATIENT
Start: 2017-04-21 | End: 2017-04-21

## 2017-04-21 RX ORDER — METHOCARBAMOL 100 MG/ML
1000 INJECTION, SOLUTION INTRAMUSCULAR; INTRAVENOUS ONCE
Status: DISCONTINUED | OUTPATIENT
Start: 2017-04-21 | End: 2017-04-21 | Stop reason: CLARIF

## 2017-04-21 RX ORDER — PROMETHAZINE HYDROCHLORIDE 25 MG/1
12.5 TABLET ORAL ONCE AS NEEDED
Status: DISCONTINUED | OUTPATIENT
Start: 2017-04-21 | End: 2017-04-21 | Stop reason: HOSPADM

## 2017-04-21 RX ORDER — ONDANSETRON 2 MG/ML
4 INJECTION INTRAMUSCULAR; INTRAVENOUS ONCE AS NEEDED
Status: DISCONTINUED | OUTPATIENT
Start: 2017-04-21 | End: 2017-04-21

## 2017-04-21 RX ORDER — CEFAZOLIN SODIUM 2 G/100ML
2 INJECTION, SOLUTION INTRAVENOUS ONCE
Status: COMPLETED | OUTPATIENT
Start: 2017-04-21 | End: 2017-04-21

## 2017-04-21 RX ORDER — MIDAZOLAM HYDROCHLORIDE 1 MG/ML
1 INJECTION INTRAMUSCULAR; INTRAVENOUS
Status: DISCONTINUED | OUTPATIENT
Start: 2017-04-21 | End: 2017-04-21 | Stop reason: HOSPADM

## 2017-04-21 RX ORDER — ONDANSETRON 2 MG/ML
4 INJECTION INTRAMUSCULAR; INTRAVENOUS EVERY 6 HOURS PRN
Status: DISCONTINUED | OUTPATIENT
Start: 2017-04-21 | End: 2017-04-22 | Stop reason: HOSPADM

## 2017-04-21 RX ORDER — FENTANYL CITRATE 50 UG/ML
50 INJECTION, SOLUTION INTRAMUSCULAR; INTRAVENOUS
Status: DISCONTINUED | OUTPATIENT
Start: 2017-04-21 | End: 2017-04-21

## 2017-04-21 RX ORDER — WOUND DRESSING ADHESIVE - LIQUID
LIQUID MISCELLANEOUS AS NEEDED
Status: DISCONTINUED | OUTPATIENT
Start: 2017-04-21 | End: 2017-04-21 | Stop reason: HOSPADM

## 2017-04-21 RX ORDER — LABETALOL HYDROCHLORIDE 5 MG/ML
5 INJECTION, SOLUTION INTRAVENOUS
Status: DISCONTINUED | OUTPATIENT
Start: 2017-04-21 | End: 2017-04-21 | Stop reason: HOSPADM

## 2017-04-21 RX ORDER — HYDROCODONE BITARTRATE AND ACETAMINOPHEN 7.5; 325 MG/1; MG/1
1 TABLET ORAL ONCE AS NEEDED
Status: COMPLETED | OUTPATIENT
Start: 2017-04-21 | End: 2017-04-21

## 2017-04-21 RX ORDER — NALOXONE HCL 0.4 MG/ML
0.2 VIAL (ML) INJECTION AS NEEDED
Status: DISCONTINUED | OUTPATIENT
Start: 2017-04-21 | End: 2017-04-21 | Stop reason: HOSPADM

## 2017-04-21 RX ORDER — FLUMAZENIL 0.1 MG/ML
0.2 INJECTION INTRAVENOUS AS NEEDED
Status: DISCONTINUED | OUTPATIENT
Start: 2017-04-21 | End: 2017-04-21

## 2017-04-21 RX ORDER — DEXAMETHASONE SODIUM PHOSPHATE 10 MG/ML
INJECTION INTRAMUSCULAR; INTRAVENOUS AS NEEDED
Status: DISCONTINUED | OUTPATIENT
Start: 2017-04-21 | End: 2017-04-21 | Stop reason: SURG

## 2017-04-21 RX ORDER — HYDROMORPHONE HYDROCHLORIDE 1 MG/ML
0.5 INJECTION, SOLUTION INTRAMUSCULAR; INTRAVENOUS; SUBCUTANEOUS
Status: COMPLETED | OUTPATIENT
Start: 2017-04-21 | End: 2017-04-21

## 2017-04-21 RX ORDER — DIPHENHYDRAMINE HYDROCHLORIDE 50 MG/ML
12.5 INJECTION INTRAMUSCULAR; INTRAVENOUS
Status: DISCONTINUED | OUTPATIENT
Start: 2017-04-21 | End: 2017-04-21 | Stop reason: HOSPADM

## 2017-04-21 RX ORDER — NEOSTIGMINE METHYLSULFATE 1 MG/ML
INJECTION, SOLUTION INTRAVENOUS AS NEEDED
Status: DISCONTINUED | OUTPATIENT
Start: 2017-04-21 | End: 2017-04-21 | Stop reason: SURG

## 2017-04-21 RX ORDER — PROMETHAZINE HYDROCHLORIDE 25 MG/ML
12.5 INJECTION, SOLUTION INTRAMUSCULAR; INTRAVENOUS ONCE AS NEEDED
Status: DISCONTINUED | OUTPATIENT
Start: 2017-04-21 | End: 2017-04-21 | Stop reason: HOSPADM

## 2017-04-21 RX ORDER — FAMOTIDINE 10 MG/ML
20 INJECTION, SOLUTION INTRAVENOUS ONCE
Status: COMPLETED | OUTPATIENT
Start: 2017-04-21 | End: 2017-04-21

## 2017-04-21 RX ORDER — GLYCOPYRROLATE 0.2 MG/ML
INJECTION INTRAMUSCULAR; INTRAVENOUS AS NEEDED
Status: DISCONTINUED | OUTPATIENT
Start: 2017-04-21 | End: 2017-04-21 | Stop reason: SURG

## 2017-04-21 RX ORDER — HYDROCODONE BITARTRATE AND ACETAMINOPHEN 5; 325 MG/1; MG/1
1 TABLET ORAL EVERY 6 HOURS PRN
Status: DISCONTINUED | OUTPATIENT
Start: 2017-04-21 | End: 2017-04-21

## 2017-04-21 RX ORDER — CYCLOBENZAPRINE HCL 10 MG
TABLET ORAL
Qty: 40 TABLET | Refills: 0 | Status: SHIPPED | OUTPATIENT
Start: 2017-04-21 | End: 2020-09-24

## 2017-04-21 RX ORDER — PROMETHAZINE HYDROCHLORIDE 25 MG/ML
12.5 INJECTION, SOLUTION INTRAMUSCULAR; INTRAVENOUS ONCE AS NEEDED
Status: DISCONTINUED | OUTPATIENT
Start: 2017-04-21 | End: 2017-04-21

## 2017-04-21 RX ORDER — MIDAZOLAM HYDROCHLORIDE 1 MG/ML
2 INJECTION INTRAMUSCULAR; INTRAVENOUS
Status: DISCONTINUED | OUTPATIENT
Start: 2017-04-21 | End: 2017-04-21 | Stop reason: HOSPADM

## 2017-04-21 RX ORDER — SODIUM CHLORIDE 9 MG/ML
100 INJECTION, SOLUTION INTRAVENOUS CONTINUOUS
Status: DISCONTINUED | OUTPATIENT
Start: 2017-04-21 | End: 2017-04-22 | Stop reason: HOSPADM

## 2017-04-21 RX ORDER — HYDROCODONE BITARTRATE AND ACETAMINOPHEN 7.5; 325 MG/1; MG/1
1 TABLET ORAL ONCE
Status: COMPLETED | OUTPATIENT
Start: 2017-04-21 | End: 2017-04-21

## 2017-04-21 RX ORDER — SODIUM CHLORIDE, SODIUM LACTATE, POTASSIUM CHLORIDE, CALCIUM CHLORIDE 600; 310; 30; 20 MG/100ML; MG/100ML; MG/100ML; MG/100ML
9 INJECTION, SOLUTION INTRAVENOUS CONTINUOUS
Status: DISCONTINUED | OUTPATIENT
Start: 2017-04-21 | End: 2017-04-21

## 2017-04-21 RX ORDER — BUPIVACAINE HYDROCHLORIDE AND EPINEPHRINE 2.5; 5 MG/ML; UG/ML
INJECTION, SOLUTION INFILTRATION; PERINEURAL AS NEEDED
Status: DISCONTINUED | OUTPATIENT
Start: 2017-04-21 | End: 2017-04-21 | Stop reason: HOSPADM

## 2017-04-21 RX ORDER — HYDRALAZINE HYDROCHLORIDE 20 MG/ML
5 INJECTION INTRAMUSCULAR; INTRAVENOUS
Status: DISCONTINUED | OUTPATIENT
Start: 2017-04-21 | End: 2017-04-21 | Stop reason: HOSPADM

## 2017-04-21 RX ORDER — HYDROCODONE BITARTRATE AND ACETAMINOPHEN 5; 325 MG/1; MG/1
2 TABLET ORAL EVERY 6 HOURS PRN
Status: DISCONTINUED | OUTPATIENT
Start: 2017-04-21 | End: 2017-04-22 | Stop reason: HOSPADM

## 2017-04-21 RX ORDER — ONDANSETRON 2 MG/ML
INJECTION INTRAMUSCULAR; INTRAVENOUS AS NEEDED
Status: DISCONTINUED | OUTPATIENT
Start: 2017-04-21 | End: 2017-04-21 | Stop reason: SURG

## 2017-04-21 RX ORDER — FENTANYL CITRATE 50 UG/ML
INJECTION, SOLUTION INTRAMUSCULAR; INTRAVENOUS AS NEEDED
Status: DISCONTINUED | OUTPATIENT
Start: 2017-04-21 | End: 2017-04-21 | Stop reason: SURG

## 2017-04-21 RX ORDER — SODIUM CHLORIDE 0.9 % (FLUSH) 0.9 %
1-10 SYRINGE (ML) INJECTION AS NEEDED
Status: DISCONTINUED | OUTPATIENT
Start: 2017-04-21 | End: 2017-04-21 | Stop reason: HOSPADM

## 2017-04-21 RX ORDER — HYDROCODONE BITARTRATE AND ACETAMINOPHEN 5; 325 MG/1; MG/1
1 TABLET ORAL ONCE
Status: DISCONTINUED | OUTPATIENT
Start: 2017-04-21 | End: 2017-04-21

## 2017-04-21 RX ORDER — METHYLPREDNISOLONE ACETATE 80 MG/ML
INJECTION, SUSPENSION INTRA-ARTICULAR; INTRALESIONAL; INTRAMUSCULAR; SOFT TISSUE AS NEEDED
Status: DISCONTINUED | OUTPATIENT
Start: 2017-04-21 | End: 2017-04-21 | Stop reason: HOSPADM

## 2017-04-21 RX ORDER — LIDOCAINE HYDROCHLORIDE 20 MG/ML
INJECTION, SOLUTION INFILTRATION; PERINEURAL AS NEEDED
Status: DISCONTINUED | OUTPATIENT
Start: 2017-04-21 | End: 2017-04-21 | Stop reason: SURG

## 2017-04-21 RX ADMIN — HYDROCODONE BITARTRATE AND ACETAMINOPHEN 2 TABLET: 5; 325 TABLET ORAL at 23:01

## 2017-04-21 RX ADMIN — SODIUM CHLORIDE 100 ML/HR: 9 INJECTION, SOLUTION INTRAVENOUS at 15:54

## 2017-04-21 RX ADMIN — ONDANSETRON 4 MG: 2 INJECTION INTRAMUSCULAR; INTRAVENOUS at 08:17

## 2017-04-21 RX ADMIN — FENTANYL CITRATE 50 MCG: 50 INJECTION INTRAMUSCULAR; INTRAVENOUS at 09:52

## 2017-04-21 RX ADMIN — FENTANYL CITRATE 50 MCG: 50 INJECTION INTRAMUSCULAR; INTRAVENOUS at 09:44

## 2017-04-21 RX ADMIN — DOCUSATE SODIUM 100 MG: 100 CAPSULE, LIQUID FILLED ORAL at 17:13

## 2017-04-21 RX ADMIN — CETIRIZINE HYDROCHLORIDE 10 MG: 10 TABLET, FILM COATED ORAL at 17:13

## 2017-04-21 RX ADMIN — SERTRALINE 50 MG: 50 TABLET, FILM COATED ORAL at 17:13

## 2017-04-21 RX ADMIN — FENTANYL CITRATE 50 MCG: 50 INJECTION INTRAMUSCULAR; INTRAVENOUS at 08:35

## 2017-04-21 RX ADMIN — HYDROMORPHONE HYDROCHLORIDE 0.5 MG: 1 INJECTION, SOLUTION INTRAMUSCULAR; INTRAVENOUS; SUBCUTANEOUS at 10:38

## 2017-04-21 RX ADMIN — LIDOCAINE HYDROCHLORIDE 50 MG: 20 INJECTION, SOLUTION INFILTRATION; PERINEURAL at 08:05

## 2017-04-21 RX ADMIN — SODIUM CHLORIDE, POTASSIUM CHLORIDE, SODIUM LACTATE AND CALCIUM CHLORIDE: 600; 310; 30; 20 INJECTION, SOLUTION INTRAVENOUS at 08:00

## 2017-04-21 RX ADMIN — GABAPENTIN 100 MG: 100 CAPSULE ORAL at 17:13

## 2017-04-21 RX ADMIN — SERTRALINE 50 MG: 50 TABLET, FILM COATED ORAL at 23:01

## 2017-04-21 RX ADMIN — CEFAZOLIN SODIUM 2 G: 2 INJECTION, SOLUTION INTRAVENOUS at 08:00

## 2017-04-21 RX ADMIN — METHOCARBAMOL 1000 MG: 100 INJECTION INTRAMUSCULAR; INTRAVENOUS at 11:04

## 2017-04-21 RX ADMIN — GLYCOPYRROLATE 0.4 MG: 0.2 INJECTION INTRAMUSCULAR; INTRAVENOUS at 09:13

## 2017-04-21 RX ADMIN — HYDROCODONE BITARTRATE AND ACETAMINOPHEN 2 TABLET: 5; 325 TABLET ORAL at 17:12

## 2017-04-21 RX ADMIN — HYDROCODONE BITARTRATE AND ACETAMINOPHEN 1 TABLET: 7.5; 325 TABLET ORAL at 13:03

## 2017-04-21 RX ADMIN — HYDROMORPHONE HYDROCHLORIDE 0.5 MG: 1 INJECTION, SOLUTION INTRAMUSCULAR; INTRAVENOUS; SUBCUTANEOUS at 10:15

## 2017-04-21 RX ADMIN — PROPOFOL 200 MG: 10 INJECTION, EMULSION INTRAVENOUS at 08:05

## 2017-04-21 RX ADMIN — NEOSTIGMINE METHYLSULFATE 4 MG: 1 INJECTION INTRAVENOUS at 09:13

## 2017-04-21 RX ADMIN — SODIUM CHLORIDE, POTASSIUM CHLORIDE, SODIUM LACTATE AND CALCIUM CHLORIDE: 600; 310; 30; 20 INJECTION, SOLUTION INTRAVENOUS at 08:45

## 2017-04-21 RX ADMIN — HYDROMORPHONE HYDROCHLORIDE 0.5 MG: 1 INJECTION, SOLUTION INTRAMUSCULAR; INTRAVENOUS; SUBCUTANEOUS at 09:59

## 2017-04-21 RX ADMIN — HYDROCODONE BITARTRATE AND ACETAMINOPHEN 1 TABLET: 7.5; 325 TABLET ORAL at 10:13

## 2017-04-21 RX ADMIN — MIDAZOLAM 2 MG: 1 INJECTION INTRAMUSCULAR; INTRAVENOUS at 07:50

## 2017-04-21 RX ADMIN — PHENYLEPHRINE HYDROCHLORIDE 100 MCG: 10 INJECTION INTRAVENOUS at 08:57

## 2017-04-21 RX ADMIN — FENTANYL CITRATE 50 MCG: 50 INJECTION INTRAMUSCULAR; INTRAVENOUS at 08:05

## 2017-04-21 RX ADMIN — DEXAMETHASONE SODIUM PHOSPHATE 8 MG: 10 INJECTION INTRAMUSCULAR; INTRAVENOUS at 08:17

## 2017-04-21 RX ADMIN — HYDROMORPHONE HYDROCHLORIDE 0.5 MG: 1 INJECTION, SOLUTION INTRAMUSCULAR; INTRAVENOUS; SUBCUTANEOUS at 10:32

## 2017-04-21 RX ADMIN — FENTANYL CITRATE 25 MCG: 50 INJECTION INTRAMUSCULAR; INTRAVENOUS at 09:20

## 2017-04-21 RX ADMIN — ROCURONIUM BROMIDE 35 MG: 10 INJECTION INTRAVENOUS at 08:05

## 2017-04-21 RX ADMIN — FAMOTIDINE 20 MG: 10 INJECTION, SOLUTION INTRAVENOUS at 07:50

## 2017-04-21 RX ADMIN — SODIUM CHLORIDE, POTASSIUM CHLORIDE, SODIUM LACTATE AND CALCIUM CHLORIDE 9 ML/HR: 600; 310; 30; 20 INJECTION, SOLUTION INTRAVENOUS at 07:50

## 2017-04-21 NOTE — PLAN OF CARE
Problem: Perioperative Period (Adult)  Goal: Signs and Symptoms of Listed Potential Problems Will be Absent or Manageable (Perioperative Period)  Outcome: Ongoing (interventions implemented as appropriate)    04/21/17 1517   Perioperative Period   Problems Assessed (Perioperative Period) pain   Problems Present (Perioperative Period) pain

## 2017-04-21 NOTE — BRIEF OP NOTE
LUMBAR DISCECTOMY POSTERIOR WITH METRIX  Procedure Note    Oksana Tanneril  4/21/2017    Pre-op Diagnosis:   Intervertebral disc disorder with radiculopathy of lumbosacral region [M51.17]  Calf muscle weakness [M62.81]    Post-op Diagnosis:     Post-Op Diagnosis Codes:     * Intervertebral disc disorder with radiculopathy of lumbosacral region [M51.17]     * Calf muscle weakness [M62.81]    Procedure/CPT® Codes:      Procedure(s):  Right L5/S1 Metrx Microdiscectomy, outpatient    Surgeon(s):  Brian Ojeda MD    Anesthesia: General    Staff:   Circulator: Shena Macdonald RN  Scrub Person: Diana Garcia  Assistant: Zainab Grossman CSA    Estimated Blood Loss: * No values recorded between 4/21/2017  8:00 AM and 4/21/2017  9:20 AM * minimal  Urine Voided: * No values recorded between 4/21/2017  8:00 AM and 4/21/2017  9:20 AM *    Specimens:                * No specimens in log *      Drains: none          Findings: subligamentous inferior extrusion    Complications: none      Brian Ojeda MD     Date: 4/21/2017  Time: 9:29 AM

## 2017-04-21 NOTE — ANESTHESIA PREPROCEDURE EVALUATION
Anesthesia Evaluation     Patient summary reviewed and Nursing notes reviewed   no history of anesthetic complications:  NPO Status: > 8 hours   Airway   Mallampati: II  TM distance: >3 FB  Neck ROM: full  no difficulty expected  Dental - normal exam     Pulmonary - negative pulmonary ROS and normal exam    breath sounds clear to auscultation  (-) rhonchi, decreased breath sounds, wheezes, rales, stridor  Cardiovascular - negative cardio ROS and normal exam    Rhythm: regular  Rate: normal    (-) murmur, weak pulses, friction rub, systolic click, carotid bruits, JVD, peripheral edema      Neuro/Psych- negative ROS  GI/Hepatic/Renal/Endo - negative ROS     Musculoskeletal     (+) back pain,   Abdominal  - normal exam    Abdomen: soft.   Substance History - negative use     OB/GYN negative ob/gyn ROS         Other - negative ROS                                   Anesthesia Plan    ASA 1     general     intravenous induction   Anesthetic plan and risks discussed with patient.

## 2017-04-21 NOTE — ANESTHESIA POSTPROCEDURE EVALUATION
Patient: Oksana Pillai    Procedure Summary     Date Anesthesia Start Anesthesia Stop Room / Location    04/21/17 0800 0935  [facility] / [facility]       Procedure Diagnosis Surgeon Provider    Right L5/S1 Metrx Microdiscectomy (Right Spine Lumbar) Intervertebral disc disorder with radiculopathy of lumbosacral region; Calf muscle weakness  (Intervertebral disc disorder with radiculopathy of lumbosacral region [M51.17]; Calf muscle weakness [M62.81]) MD Jose Villanueva MD          Anesthesia Type: general  Last vitals  /69 (04/21/17 1140)    Temp 36.6 °C (97.9 °F) (04/21/17 1120)    Pulse 58 (04/21/17 1140)   Resp 16 (04/21/17 1140)    SpO2 97 % (04/21/17 1140)      Post Anesthesia Care and Evaluation    Patient location during evaluation: PHASE II  Patient participation: complete - patient participated  Level of consciousness: awake and alert  Pain management: adequate  Airway patency: patent  Anesthetic complications: No anesthetic complications    Cardiovascular status: acceptable  Respiratory status: acceptable  Hydration status: acceptable

## 2017-04-21 NOTE — DISCHARGE INSTRUCTIONS
You had one Norco for pain at 10:13 am.    :Remove dressing in 48 hours. Do not need to put another one on. Has f/u visit in 2 weeks.         [facility]   [address], [address]   [place] [address]   [phone]     Lumbar Laminectomy, Discectomy or Decompression Post-Operative Instructions     1.   You should have a post-operative visit scheduled with the Physician Assistant/Nurse Practitioner for approximately 2 to 2 1/2 weeks from your date of surgery. If you do not have one, call the office when you return home to schedule a visit. You should also be off work at least until your first visit.     2.   Do not lift anything over five (5) pounds. No bending, twisting, or squatting should be done until the first visit. However, walking is allowed and encouraged. Walking should be done on a flat surface. The speed and distance should be chosen according to your comfort level and stamina. In general, short frequent walks are preferred. Climbing stairs is allowed but should be minimized. In general, activity restrictions will be lessened following the first visit.     3.   Sitting, while allowed, should be kept to a minimum until the first visit because it may increase your discomfort. No more than 15 to 20 minutes three times a day should be done - enough time to eat your meals and use the bathroom. Otherwise, if you are not walking or standing, you should be lying down on your back or side. A reclining chair is acceptable.     4.   Please do not drive until the first visit, although, you may be driven.     5.   Refrain from any sexual activity until after your first visit with the physician assistant.     6.   Take off your dressings and leave them off after the first day home. You may get the incisions wet during showering and pat them dry, but do not soak the incisions or rub them vigorously with a towel. No tub baths or hot tubs are allowed. The incision should be cleaned three times a  daily with hydrogen peroxide unless otherwise directed by the nurse or physician.     7.   A prescription for pain medication will be provided at discharge. This medication is primarily for any pain related to the incision and should be used only on an as-needed basis. Sometimes, antibiotics and medications for spasm are also given. Take medications only as directed by the doctor. If a refill of your pain medication is required, due to changes in the Federal law, in order to have this medication refilled you must contact the office four days prior to the due date to make arrangements to pick-up the prescriptionin or office. The prescription refill cannot be called in to the pharmacy. Your prescription will be ready fro pick-up the day the refill is due.     8.   If there are any problems, such as excessive back or leg pain, persistent temperature of 100 degrees or greater despite Tylenol, chills excessive bloody discharge from the wound, redness and hot skin around the incision, clear or yellowish discharge from the wound, or severe headaches, particularly when sitting or standing, please call the office. A small amount of bleeding from the incision during the first few days is not unusual.     9.   We look forward to seeing you again for follow-up. Do not hesitate to call if any questions or concerns arise regarding your surgery. We would rather you communicate with us regarding such issues early.

## 2017-04-21 NOTE — ANESTHESIA PROCEDURE NOTES
Airway  Date/Time: 4/21/2017 8:05 AM  End Time:4/21/2017 8:09 AM  Airway not difficult    General Information and Staff    Patient location during procedure: OR  Anesthesiologist: ANTONY DURANT  CRNA: JEFFERSON RAM    Indications and Patient Condition  Indications for airway management: airway protection    Preoxygenated: yes  MILS maintained throughout  Mask difficulty assessment: 1 - vent by mask    Final Airway Details  Final airway type: endotracheal airway      Successful airway: ETT  Cuffed: yes   Successful intubation technique: direct laryngoscopy  Facilitating devices/methods: intubating stylet and cricoid pressure  Endotracheal tube insertion site: oral  Blade: Carmella  Blade size: #3  ETT size: 7.0 mm  Cormack-Lehane Classification: grade I - full view of glottis  Placement verified by: chest auscultation and capnometry   Cuff volume (mL): 6  Measured from: lips  ETT to lips (cm): 21  Number of attempts at approach: 1    Additional Comments  PreO2 X 5 mins. SIVI. Atraumatic Intubation. BBS= Teeth remain intact as per preop exam

## 2017-04-21 NOTE — OP NOTE
DATE OF PROCEDURE: 04/21/2017     PREOPERATIVE DIAGNOSIS: Right L5-S1 herniated lumbar disc.      POSTOPERATIVE DIAGNOSIS: Right L5-S1 herniated lumbar disc.      PROCEDURE PERFORMED:  Outpatient right L5-S1 Metrx Microdiscectomy.     SURGEON: Brian Ojeda MD     FIRST ASSISTANT: REJI Melendrez, who greatly assisted in the exposure, control of bleeding, retraction, and closure of the incision.     ANESTHESIA: General endotracheal.     ESTIMATED BLOOD LOSS: Minimal.     COMPLICATIONS: None.     SPECIMENS: None.     DRAINS: None.     FINDINGS: Subligamentous extrusion.     POSTOPERATIVE CONDITION: Stable.     INDICATIONS FOR PROCEDURE: The patient is a 41-year-old female who has a history of right buttock and leg pain that has been going on now for about 6 months. There has been some weakness in the right calf. She was found to have a herniated disc at L5-S1 with root compression on the right.  Because of the failure for the weakness to improve and the failure of the pain even with some conservative measures such as blocks and physical therapy, and gabapentin she was brought to the operating room for surgical treatment. She did not need to be fused.     INFORMED CONSENT:  She understood the goal of surgery is relief of her radiating buttock and leg pain and improvement of her numbness, tingling, and her weakness. She knew that overall this would not help or hinder her middle to low back pain, which was modest. The risks include, but are not limited to infection, hemorrhage requiring transfusion or reoperation, CSF leak requiring reoperation, incomplete relief of symptoms, potential need for additional surgeries in the future, stroke, paralysis, coma, and death. She agreed to proceed.     DESCRIPTION OF PROCEDURE: The patient was taken to the operating room and remained on her gurney in the supine position. After induction and endotracheal intubation, she got 2 g of Kefzol as per the SCIP protocol. She  was rolled over in the prone position on the radiolucent Ramon spinal table. All pressure points were padded including the brachial plexus. We brought in the C-arm and marked out the incision at the right L5-S1 level about 1 cm out laterally. The lumbar region was then prepped and draped in the usual sterile fashion as was the C-arm. We did a surgical timeout. I injected 10 mL of 0.25% Marcaine with epinephrine in the paraspinous musculature on the right at L5-S1. A paramedian incision was made at L5-S1 towards the right with a #10 skin knife. Hemostasis was obtained with monopolar cautery. Using lateral C-arm fluoroscopy, I dilated up to 18 mm tubular retraction and then used a 7 cm x 18 mm tubular retractor and affixed it to the table with a flexible snake arm. It was identified as being on the right at L5-S1 seen on both PA and lateral view. The C-arm was removed and the microscope was draped and brought into the field. Its use was essential to the performance of microneurosurgical technique. Using a 3 mm matchstick on the electric Mary drill, I did a generous right L5-S1 hemilaminectomy, partial medial facetectomy, and foraminotomy. The yellow ligament was divided with a #15 blade and the remainder of the bony ligamentous removal was done with 2 mm and 3 mm angled Cloward punches. I identified the S1 nerve root and retracted it medially. There was no outright free fragment but there was a subligamentous extrusion inferiorly compressing the root in the lateral recesses. I incised the disc and the disc space and then using reverse angle curettes both flat and angled as well as pituitaries,  I removed a subligamentous extrusion as well as an internal decompression of the disc. I made sure there was nothing remaining on the nerve root and when that was done I irrigated out with antibiotic irrigation. Then 80 mg of Depo-Medrol was left in the epidural space. Tubular retractor was removed. The subcutaneous layer  was closed with 3-0 interrupted Vicryl suture. The skin was closed with running 4-0 Vicryl subcuticular. Steri-Strips and a clean, dry dressing were placed. The patient tolerated the procedure well. There were no complications. All counts were correct.  She was rolled over in the supine position, extubated, and taken to the recovery room in satisfactory stable condition. This was an outpatient procedure and she should be going home.         Brian Ojeda M.D.  WGV:aa  D:   04/21/2017 09:39:36  T:   04/21/2017 09:59:36  Job ID:   [id number]  Document ID:   [id number]  cc:

## 2017-04-21 NOTE — PLAN OF CARE
Problem: Patient Care Overview (Adult)  Goal: Plan of Care Review  Outcome: Ongoing (interventions implemented as appropriate)    04/21/17 0723   Coping/Psychosocial Response Interventions   Plan Of Care Reviewed With patient   Patient Care Overview   Progress no change       Goal: Adult Individualization and Mutuality  Outcome: Ongoing (interventions implemented as appropriate)    04/21/17 0723   Individualization   Patient Specific Preferences Oksana       Goal: Discharge Needs Assessment  Outcome: Ongoing (interventions implemented as appropriate)    Problem: Perioperative Period (Adult)  Goal: Signs and Symptoms of Listed Potential Problems Will be Absent or Manageable (Perioperative Period)  Outcome: Ongoing (interventions implemented as appropriate)    04/21/17 0723   Perioperative Period   Problems Assessed (Perioperative Period) all   Problems Present (Perioperative Period) none

## 2017-04-21 NOTE — DISCHARGE SUMMARY
Date of admission: 4/21/17  Date of discharge: 4/22/17    Hospital course:  Ms. Pillai underwent an uncomplicated R L5-S1 metrx microdiscectomy with Dr. Ojeda on Friday 4/21.  She did well and was up and walking in the PACU and voiding without difficulty but had quite a bit of back and R leg pain postoperatively and we therefore kept her overnight in order to make sure that her pain was going to be controlled. She will go home the morning of 4/22 as long as her pain is controlled, she is afebrile/VSS and continues to ambulate and void without difficulty.      Wound care:  Pt can shower daily. Clean wound with peroxide tid, call with fever or chills, wound redness or drainage.     Restrictions:  No driving, no lifting more than 5lbs, no bending or twisting, no sitting greater than 20min at a time. Pt is encouraged to walk as much as possible.     D/c meds:  Please see the d/c med rec.

## 2017-04-21 NOTE — PLAN OF CARE
Problem: Patient Care Overview (Adult)  Goal: Plan of Care Review  Outcome: Ongoing (interventions implemented as appropriate)    04/21/17 1757   Coping/Psychosocial Response Interventions   Plan Of Care Reviewed With patient;mother   Patient Care Overview   Progress improving         04/21/17 1757   Coping/Psychosocial Response Interventions   Plan Of Care Reviewed With patient;mother   Patient Care Overview   Progress improving   Outcome Evaluation   Outcome Summary/Follow up Plan Observation. Pain meds given. Up to toilet x2. Mother present.        Goal: Adult Individualization and Mutuality  Outcome: Ongoing (interventions implemented as appropriate)    04/21/17 0723   Individualization   Patient Specific Preferences Oksana       Goal: Discharge Needs Assessment    04/21/17 1757   Discharge Needs Assessment   Concerns To Be Addressed no discharge needs identified   Self-Care   Equipment Currently Used at Home none   Living Environment   Transportation Available car;family or friend will provide         Problem: Perioperative Period (Adult)  Goal: Signs and Symptoms of Listed Potential Problems Will be Absent or Manageable (Perioperative Period)  Outcome: Ongoing (interventions implemented as appropriate)    04/21/17 1757   Perioperative Period   Problems Assessed (Perioperative Period) all   Problems Present (Perioperative Period) pain

## 2017-04-22 VITALS
RESPIRATION RATE: 18 BRPM | HEART RATE: 62 BPM | SYSTOLIC BLOOD PRESSURE: 100 MMHG | TEMPERATURE: 98.2 F | HEIGHT: 67 IN | DIASTOLIC BLOOD PRESSURE: 62 MMHG | WEIGHT: 207.13 LBS | OXYGEN SATURATION: 96 % | BODY MASS INDEX: 32.51 KG/M2

## 2017-04-22 LAB
ANION GAP SERPL CALCULATED.3IONS-SCNC: 10.9 MMOL/L
BUN BLD-MCNC: 10 MG/DL (ref 6–20)
BUN/CREAT SERPL: 13 (ref 7–25)
CALCIUM SPEC-SCNC: 9.1 MG/DL (ref 8.6–10.5)
CHLORIDE SERPL-SCNC: 103 MMOL/L (ref 98–107)
CO2 SERPL-SCNC: 25.1 MMOL/L (ref 22–29)
CREAT BLD-MCNC: 0.77 MG/DL (ref 0.57–1)
DEPRECATED RDW RBC AUTO: 41.3 FL (ref 37–54)
ERYTHROCYTE [DISTWIDTH] IN BLOOD BY AUTOMATED COUNT: 12.2 % (ref 11.7–13)
GFR SERPL CREATININE-BSD FRML MDRD: 83 ML/MIN/1.73
GLUCOSE BLD-MCNC: 122 MG/DL (ref 65–99)
HCT VFR BLD AUTO: 38 % (ref 35.6–45.5)
HGB BLD-MCNC: 12.9 G/DL (ref 11.9–15.5)
MCH RBC QN AUTO: 31.9 PG (ref 26.9–32)
MCHC RBC AUTO-ENTMCNC: 33.9 G/DL (ref 32.4–36.3)
MCV RBC AUTO: 94.1 FL (ref 80.5–98.2)
PLATELET # BLD AUTO: 201 10*3/MM3 (ref 140–500)
PMV BLD AUTO: 11.7 FL (ref 6–12)
POTASSIUM BLD-SCNC: 4.2 MMOL/L (ref 3.5–5.2)
RBC # BLD AUTO: 4.04 10*6/MM3 (ref 3.9–5.2)
SODIUM BLD-SCNC: 139 MMOL/L (ref 136–145)
WBC NRBC COR # BLD: 14.29 10*3/MM3 (ref 4.5–10.7)

## 2017-04-22 PROCEDURE — 85027 COMPLETE CBC AUTOMATED: CPT | Performed by: PHYSICIAN ASSISTANT

## 2017-04-22 PROCEDURE — 80048 BASIC METABOLIC PNL TOTAL CA: CPT | Performed by: PHYSICIAN ASSISTANT

## 2017-04-22 PROCEDURE — G0378 HOSPITAL OBSERVATION PER HR: HCPCS

## 2017-04-22 RX ADMIN — GABAPENTIN 100 MG: 100 CAPSULE ORAL at 09:04

## 2017-04-22 RX ADMIN — HYDROCODONE BITARTRATE AND ACETAMINOPHEN 2 TABLET: 5; 325 TABLET ORAL at 10:49

## 2017-04-22 RX ADMIN — CETIRIZINE HYDROCHLORIDE 10 MG: 10 TABLET, FILM COATED ORAL at 09:04

## 2017-04-22 RX ADMIN — SERTRALINE 50 MG: 50 TABLET, FILM COATED ORAL at 09:04

## 2017-04-22 RX ADMIN — CYCLOBENZAPRINE HYDROCHLORIDE 10 MG: 10 TABLET, FILM COATED ORAL at 02:16

## 2017-04-22 RX ADMIN — DOCUSATE SODIUM 100 MG: 100 CAPSULE, LIQUID FILLED ORAL at 09:04

## 2017-04-22 RX ADMIN — HYDROCODONE BITARTRATE AND ACETAMINOPHEN 2 TABLET: 5; 325 TABLET ORAL at 05:10

## 2017-04-22 RX ADMIN — SODIUM CHLORIDE 100 ML/HR: 9 INJECTION, SOLUTION INTRAVENOUS at 02:18

## 2017-04-22 RX ADMIN — CYCLOBENZAPRINE HYDROCHLORIDE 10 MG: 10 TABLET, FILM COATED ORAL at 09:42

## 2017-04-22 NOTE — PLAN OF CARE
Problem: Patient Care Overview (Adult)  Goal: Plan of Care Review    04/22/17 0558   Coping/Psychosocial Response Interventions   Plan Of Care Reviewed With patient;mother   Patient Care Overview   Progress no change   Outcome Evaluation   Outcome Summary/Follow up Plan VSS. A&ox4 still with complaints of pain, adequate control with [place]. Ambulating to bathroom with minimal assist

## 2017-04-26 ENCOUNTER — TELEPHONE (OUTPATIENT)
Dept: NEUROSURGERY | Facility: CLINIC | Age: 42
End: 2017-04-26

## 2017-04-26 RX ORDER — PROMETHAZINE HYDROCHLORIDE 25 MG/1
25 TABLET ORAL EVERY 6 HOURS PRN
Qty: 30 TABLET | Refills: 0 | Status: SHIPPED | OUTPATIENT
Start: 2017-04-26 | End: 2017-05-05

## 2017-04-26 NOTE — TELEPHONE ENCOUNTER
Spoke with the patient's .  He notes that this morning she woke up with nausea, vomiting and feeling lethargic.  Her wound is fine and she is not having a fever.    Per Dr. Ojeda, this could be side effects from her hydrocodone and flexeril.  Phenergan 25 mg Q6H PRN was called to the patient's pharmacy.    Tejinder will call back if this doesn't help.

## 2017-04-26 NOTE — TELEPHONE ENCOUNTER
How are you feeling?  Ms. Pillai says that she has been fine.  She has been taking care of a sick child and has a contracted a stomach bug.  She has been walking as much as she can.    Are you having any pain?  Yes, in her lower back.    Do you feel better than before surgery?  She thinks so, she says it is hard to tell at this point.     Are you taking the prescribed pain medicine?  Yes     Do you feel like it's helping?  Yes      Surgical dressing/dermabond?  Her dressings were removed the day after she got home from the hospital.  She has showered and is using the peroxide daily.  She said that there are no bubbles with the peroxide.    Surgery site-red, swollen, drainage?  No     Any other problems? (ie:  Nausea or constipation)  She said that she hasn't had a bm since she has been home from the hospital, but she isn't having any pain from not going.  I told that pain medication can cause constipation and she can use any OTC laxative or stool softener that she likes.  I advised her to call the office if she begins to have belly pain and still cannot go.    Questions regarding Post-op/ discharge instructions?  no    Any other questions?  No     Confirm post op appointment  Yes

## 2017-05-05 ENCOUNTER — OFFICE VISIT (OUTPATIENT)
Dept: NEUROSURGERY | Facility: CLINIC | Age: 42
End: 2017-05-05

## 2017-05-05 VITALS
SYSTOLIC BLOOD PRESSURE: 104 MMHG | HEART RATE: 74 BPM | WEIGHT: 207 LBS | DIASTOLIC BLOOD PRESSURE: 80 MMHG | HEIGHT: 67 IN | BODY MASS INDEX: 32.49 KG/M2

## 2017-05-05 DIAGNOSIS — Z09 FOLLOW-UP EXAMINATION FOLLOWING SURGERY: Primary | ICD-10-CM

## 2017-05-05 PROCEDURE — 99024 POSTOP FOLLOW-UP VISIT: CPT | Performed by: NURSE PRACTITIONER

## 2017-05-05 RX ORDER — HYDROCODONE BITARTRATE AND ACETAMINOPHEN 7.5; 325 MG/1; MG/1
1 TABLET ORAL EVERY 6 HOURS PRN
Qty: 40 TABLET | Refills: 0 | Status: SHIPPED | OUTPATIENT
Start: 2017-05-05 | End: 2017-06-13

## 2017-05-05 RX ORDER — GABAPENTIN 100 MG/1
200 CAPSULE ORAL 2 TIMES DAILY
Qty: 120 CAPSULE | Refills: 3 | Status: SHIPPED | OUTPATIENT
Start: 2017-05-05 | End: 2017-07-17

## 2017-06-06 ENCOUNTER — OFFICE VISIT (OUTPATIENT)
Dept: NEUROSURGERY | Facility: CLINIC | Age: 42
End: 2017-06-06

## 2017-06-06 VITALS
SYSTOLIC BLOOD PRESSURE: 118 MMHG | HEART RATE: 84 BPM | WEIGHT: 207 LBS | DIASTOLIC BLOOD PRESSURE: 86 MMHG | BODY MASS INDEX: 32.49 KG/M2 | HEIGHT: 67 IN

## 2017-06-06 DIAGNOSIS — Z09 FOLLOW-UP EXAMINATION FOLLOWING SURGERY: Primary | ICD-10-CM

## 2017-06-06 PROCEDURE — 99024 POSTOP FOLLOW-UP VISIT: CPT | Performed by: NURSE PRACTITIONER

## 2017-06-09 ENCOUNTER — HOSPITAL ENCOUNTER (OUTPATIENT)
Dept: CARDIOLOGY | Facility: HOSPITAL | Age: 42
Discharge: HOME | End: 2017-06-09
Admitting: NURSE PRACTITIONER

## 2017-06-09 DIAGNOSIS — Z09 FOLLOW-UP EXAMINATION FOLLOWING SURGERY: ICD-10-CM

## 2017-06-09 LAB
BH CV LOWER VASCULAR LEFT COMMON FEMORAL AUGMENT: NORMAL
BH CV LOWER VASCULAR LEFT COMMON FEMORAL COMPETENT: NORMAL
BH CV LOWER VASCULAR LEFT COMMON FEMORAL COMPRESS: NORMAL
BH CV LOWER VASCULAR LEFT COMMON FEMORAL PHASIC: NORMAL
BH CV LOWER VASCULAR LEFT COMMON FEMORAL SPONT: NORMAL
BH CV LOWER VASCULAR LEFT DISTAL FEMORAL COMPRESS: NORMAL
BH CV LOWER VASCULAR LEFT GASTRONEMIUS COMPRESS: NORMAL
BH CV LOWER VASCULAR LEFT GREATER SAPH AK COMPRESS: NORMAL
BH CV LOWER VASCULAR LEFT GREATER SAPH BK COMPRESS: NORMAL
BH CV LOWER VASCULAR LEFT MID FEMORAL AUGMENT: NORMAL
BH CV LOWER VASCULAR LEFT MID FEMORAL COMPETENT: NORMAL
BH CV LOWER VASCULAR LEFT MID FEMORAL COMPRESS: NORMAL
BH CV LOWER VASCULAR LEFT MID FEMORAL PHASIC: NORMAL
BH CV LOWER VASCULAR LEFT MID FEMORAL SPONT: NORMAL
BH CV LOWER VASCULAR LEFT PERONEAL COMPRESS: NORMAL
BH CV LOWER VASCULAR LEFT POPLITEAL AUGMENT: NORMAL
BH CV LOWER VASCULAR LEFT POPLITEAL COMPETENT: NORMAL
BH CV LOWER VASCULAR LEFT POPLITEAL COMPRESS: NORMAL
BH CV LOWER VASCULAR LEFT POPLITEAL PHASIC: NORMAL
BH CV LOWER VASCULAR LEFT POPLITEAL SPONT: NORMAL
BH CV LOWER VASCULAR LEFT POSTERIOR TIBIAL COMPRESS: NORMAL
BH CV LOWER VASCULAR LEFT PROXIMAL FEMORAL COMPRESS: NORMAL
BH CV LOWER VASCULAR LEFT SAPHENOFEMORAL JUNCTION AUGMENT: NORMAL
BH CV LOWER VASCULAR LEFT SAPHENOFEMORAL JUNCTION COMPETENT: NORMAL
BH CV LOWER VASCULAR LEFT SAPHENOFEMORAL JUNCTION COMPRESS: NORMAL
BH CV LOWER VASCULAR LEFT SAPHENOFEMORAL JUNCTION PHASIC: NORMAL
BH CV LOWER VASCULAR LEFT SAPHENOFEMORAL JUNCTION SPONT: NORMAL
BH CV LOWER VASCULAR RIGHT COMMON FEMORAL AUGMENT: NORMAL
BH CV LOWER VASCULAR RIGHT COMMON FEMORAL COMPETENT: NORMAL
BH CV LOWER VASCULAR RIGHT COMMON FEMORAL COMPRESS: NORMAL
BH CV LOWER VASCULAR RIGHT COMMON FEMORAL PHASIC: NORMAL
BH CV LOWER VASCULAR RIGHT COMMON FEMORAL SPONT: NORMAL
BH CV LOWER VASCULAR RIGHT DISTAL FEMORAL COMPRESS: NORMAL
BH CV LOWER VASCULAR RIGHT GASTRONEMIUS COMPRESS: NORMAL
BH CV LOWER VASCULAR RIGHT GREATER SAPH AK COMPRESS: NORMAL
BH CV LOWER VASCULAR RIGHT GREATER SAPH BK COMPRESS: NORMAL
BH CV LOWER VASCULAR RIGHT MID FEMORAL AUGMENT: NORMAL
BH CV LOWER VASCULAR RIGHT MID FEMORAL COMPETENT: NORMAL
BH CV LOWER VASCULAR RIGHT MID FEMORAL COMPRESS: NORMAL
BH CV LOWER VASCULAR RIGHT MID FEMORAL PHASIC: NORMAL
BH CV LOWER VASCULAR RIGHT MID FEMORAL SPONT: NORMAL
BH CV LOWER VASCULAR RIGHT PERONEAL COMPRESS: NORMAL
BH CV LOWER VASCULAR RIGHT POPLITEAL AUGMENT: NORMAL
BH CV LOWER VASCULAR RIGHT POPLITEAL COMPETENT: NORMAL
BH CV LOWER VASCULAR RIGHT POPLITEAL COMPRESS: NORMAL
BH CV LOWER VASCULAR RIGHT POPLITEAL PHASIC: NORMAL
BH CV LOWER VASCULAR RIGHT POPLITEAL SPONT: NORMAL
BH CV LOWER VASCULAR RIGHT POSTERIOR TIBIAL COMPRESS: NORMAL
BH CV LOWER VASCULAR RIGHT PROXIMAL FEMORAL COMPRESS: NORMAL
BH CV LOWER VASCULAR RIGHT SAPHENOFEMORAL JUNCTION AUGMENT: NORMAL
BH CV LOWER VASCULAR RIGHT SAPHENOFEMORAL JUNCTION COMPETENT: NORMAL
BH CV LOWER VASCULAR RIGHT SAPHENOFEMORAL JUNCTION COMPRESS: NORMAL
BH CV LOWER VASCULAR RIGHT SAPHENOFEMORAL JUNCTION PHASIC: NORMAL
BH CV LOWER VASCULAR RIGHT SAPHENOFEMORAL JUNCTION SPONT: NORMAL

## 2017-06-09 PROCEDURE — 93970 EXTREMITY STUDY: CPT

## 2017-06-13 ENCOUNTER — OFFICE VISIT (OUTPATIENT)
Dept: RETAIL CLINIC | Facility: CLINIC | Age: 42
End: 2017-06-13

## 2017-06-13 ENCOUNTER — TELEPHONE (OUTPATIENT)
Dept: NEUROSURGERY | Facility: CLINIC | Age: 42
End: 2017-06-13

## 2017-06-13 VITALS
DIASTOLIC BLOOD PRESSURE: 85 MMHG | SYSTOLIC BLOOD PRESSURE: 128 MMHG | HEART RATE: 84 BPM | OXYGEN SATURATION: 99 % | TEMPERATURE: 98.8 F | RESPIRATION RATE: 12 BRPM

## 2017-06-13 DIAGNOSIS — R53.83 FATIGUE, UNSPECIFIED TYPE: ICD-10-CM

## 2017-06-13 DIAGNOSIS — J01.00 ACUTE NON-RECURRENT MAXILLARY SINUSITIS: Primary | ICD-10-CM

## 2017-06-13 PROCEDURE — 99214 OFFICE O/P EST MOD 30 MIN: CPT | Performed by: FAMILY MEDICINE

## 2017-06-13 PROCEDURE — 36415 COLL VENOUS BLD VENIPUNCTURE: CPT | Performed by: FAMILY MEDICINE

## 2017-06-13 RX ORDER — AMOXICILLIN AND CLAVULANATE POTASSIUM 875; 125 MG/1; MG/1
1 TABLET, FILM COATED ORAL 2 TIMES DAILY
Qty: 20 TABLET | Refills: 0
Start: 2017-06-13 | End: 2017-06-23

## 2017-06-13 RX ORDER — BENZONATATE 100 MG/1
CAPSULE ORAL
Qty: 30 CAPSULE | Refills: 0
Start: 2017-06-13 | End: 2017-07-11

## 2017-06-14 DIAGNOSIS — R79.89 DECREASED THYROXINE (T4) LEVEL: ICD-10-CM

## 2017-06-14 DIAGNOSIS — E55.9 VITAMIN D DEFICIENCY: Primary | ICD-10-CM

## 2017-06-14 LAB
25(OH)D3+25(OH)D2 SERPL-MCNC: 21.3 NG/ML (ref 30–100)
ALBUMIN SERPL-MCNC: 4.3 G/DL (ref 3.5–5.5)
ALBUMIN/GLOB SERPL: 1.7 {RATIO} (ref 1.2–2.2)
ALP SERPL-CCNC: 105 IU/L (ref 39–117)
ALT SERPL-CCNC: 19 IU/L (ref 0–32)
AST SERPL-CCNC: 21 IU/L (ref 0–40)
BASOPHILS # BLD AUTO: 0 X10E3/UL (ref 0–0.2)
BASOPHILS NFR BLD AUTO: 0 %
BILIRUB SERPL-MCNC: 0.4 MG/DL (ref 0–1.2)
BUN SERPL-MCNC: 18 MG/DL (ref 6–20)
BUN/CREAT SERPL: 19 (ref 9–23)
CALCIUM SERPL-MCNC: 9.6 MG/DL (ref 8.7–10.2)
CHLORIDE SERPL-SCNC: 102 MMOL/L (ref 96–106)
CO2 SERPL-SCNC: 21 MMOL/L (ref 18–29)
CREAT SERPL-MCNC: 0.94 MG/DL (ref 0.57–1)
EOSINOPHIL # BLD AUTO: 0.1 X10E3/UL (ref 0–0.4)
EOSINOPHIL NFR BLD AUTO: 2 %
ERYTHROCYTE [DISTWIDTH] IN BLOOD BY AUTOMATED COUNT: 13.2 % (ref 12.3–15.4)
GLOBULIN SER CALC-MCNC: 2.6 G/DL (ref 1.5–4.5)
GLUCOSE SERPL-MCNC: 86 MG/DL (ref 65–99)
HBA1C MFR BLD: 5.4 % (ref 4.8–5.6)
HCT VFR BLD AUTO: 41.1 % (ref 34–46.6)
HGB BLD-MCNC: 13.3 G/DL (ref 11.1–15.9)
IMM GRANULOCYTES # BLD: 0 X10E3/UL (ref 0–0.1)
IMM GRANULOCYTES NFR BLD: 0 %
LYMPHOCYTES # BLD AUTO: 2.7 X10E3/UL (ref 0.7–3.1)
LYMPHOCYTES NFR BLD AUTO: 35 %
MCH RBC QN AUTO: 29.9 PG (ref 26.6–33)
MCHC RBC AUTO-ENTMCNC: 32.4 G/DL (ref 31.5–35.7)
MCV RBC AUTO: 92 FL (ref 79–97)
MONOCYTES # BLD AUTO: 0.5 X10E3/UL (ref 0.1–0.9)
MONOCYTES NFR BLD AUTO: 7 %
NEUTROPHILS # BLD AUTO: 4.4 X10E3/UL (ref 1.4–7)
NEUTROPHILS NFR BLD AUTO: 56 %
PLATELET # BLD AUTO: 217 X10E3/UL (ref 150–379)
POTASSIUM SERPL-SCNC: 4.6 MMOL/L (ref 3.5–5.2)
PROT SERPL-MCNC: 6.9 G/DL (ref 6–8.5)
RBC # BLD AUTO: 4.45 X10E6/UL (ref 3.77–5.28)
SODIUM SERPL-SCNC: 141 MMOL/L (ref 134–144)
T4 FREE SERPL-MCNC: 0.73 NG/DL (ref 0.82–1.77)
TSH SERPL DL<=0.005 MIU/L-ACNC: 1.11 UIU/ML (ref 0.45–4.5)
VIT B12 SERPL-MCNC: 387 PG/ML (ref 211–946)
WBC # BLD AUTO: 7.8 X10E3/UL (ref 3.4–10.8)

## 2017-06-19 ENCOUNTER — TELEPHONE (OUTPATIENT)
Dept: RETAIL CLINIC | Facility: CLINIC | Age: 42
End: 2017-06-19

## 2017-07-11 ENCOUNTER — OFFICE VISIT (OUTPATIENT)
Dept: NEUROSURGERY | Facility: CLINIC | Age: 42
End: 2017-07-11

## 2017-07-11 VITALS
BODY MASS INDEX: 32.05 KG/M2 | HEIGHT: 67 IN | HEART RATE: 71 BPM | SYSTOLIC BLOOD PRESSURE: 122 MMHG | WEIGHT: 204.2 LBS | DIASTOLIC BLOOD PRESSURE: 83 MMHG

## 2017-07-11 DIAGNOSIS — Z09 SURGERY FOLLOW-UP EXAMINATION: Primary | ICD-10-CM

## 2017-07-11 PROBLEM — M51.17 INTERVERTEBRAL DISC DISORDER WITH RADICULOPATHY OF LUMBOSACRAL REGION: Status: RESOLVED | Noted: 2017-03-08 | Resolved: 2017-07-11

## 2017-07-11 PROCEDURE — 99024 POSTOP FOLLOW-UP VISIT: CPT | Performed by: PHYSICIAN ASSISTANT

## 2017-07-11 RX ORDER — FEXOFENADINE HYDROCHLORIDE 60 MG/1
60 TABLET, FILM COATED ORAL DAILY
COMMUNITY

## 2017-07-11 RX ORDER — IBUPROFEN 800 MG/1
TABLET ORAL
COMMUNITY
Start: 2017-06-13 | End: 2018-05-21 | Stop reason: SDUPTHER

## 2017-07-11 NOTE — PROGRESS NOTES
Subjective   Patient ID: Oksana Pillai is a 41 y.o. female is here today for follow-up.  She is 10 wks out from a right L5-S1 metrx discectomy by Dr. Ojeda 4/2017.  She complains of persistent  right leg and foot numbness.  She is currently going to PT once a week. Physical therapy has requested she have a back brace.  She takes Gabapentin 200 mg BID for pain. She stopped taking Cyclobenzaprine because of drowsiness.     Back Pain   This is a chronic problem. The pain is present in the lumbar spine. The pain is at a severity of 5/10. The pain is moderate. Associated symptoms include leg pain. Pertinent negatives include no bladder incontinence, bowel incontinence, fever or weakness.       The following portions of the patient's history were reviewed and updated as appropriate: allergies, current medications, past family history, past medical history, past social history, past surgical history and problem list.    Review of Systems   Constitutional: Negative for fever.   Gastrointestinal: Negative for bowel incontinence.   Genitourinary: Negative for bladder incontinence and difficulty urinating.   Musculoskeletal: Positive for back pain (right leg pain ).   Neurological: Negative for weakness.   All other systems reviewed and are negative.      Objective   Physical Exam   Constitutional: She is oriented to person, place, and time. She appears well-developed and well-nourished.   HENT:   Head: Normocephalic and atraumatic.   Right Ear: External ear normal.   Left Ear: External ear normal.   Eyes: Conjunctivae and EOM are normal. Pupils are equal, round, and reactive to light. Right eye exhibits no discharge. Left eye exhibits no discharge.   Neck: Normal range of motion. Neck supple. No tracheal deviation present.   Pulmonary/Chest: Effort normal. No stridor. No respiratory distress.   Musculoskeletal: Normal range of motion. She exhibits no edema, tenderness or deformity.   Neurological: She is alert and oriented  to person, place, and time. She has normal strength and normal reflexes. She displays no atrophy, no tremor and normal reflexes. No cranial nerve deficit or sensory deficit. She exhibits normal muscle tone. She displays a negative Romberg sign. She displays no seizure activity. Coordination and gait normal.   No long tract signs   Skin: Skin is warm and dry.   Psychiatric: She has a normal mood and affect. Her behavior is normal. Judgment and thought content normal.   Nursing note and vitals reviewed.    Neurologic Exam     Mental Status   Oriented to person, place, and time.     Cranial Nerves     CN III, IV, VI   Pupils are equal, round, and reactive to light.  Extraocular motions are normal.     Motor Exam     Strength   Strength 5/5 throughout.       Assessment/Plan   Independent Review of Radiographic Studies:      Medical Decision Making:    Ms. Pillai is almost 3 months out from a right L5-S1 Metrix microdiscectomy.  She continues to complain of rather severe low back pain particularly along the right side as well as some residual calf and leg pain.  Although the leg pain has improved somewhat since surgery she does not feel she has improved much from her last appointment.  She has been going to physical therapy but continues to have difficulty doing her day-to-day activities without significant pain.  She is using Flexeril as well as gabapentin for her discomfort.    I did reassure her that recovery takes a while and encouraged her to increase her activity.  However at this point she really does not feel she can tolerate any increased activity.  She also does not feel that she could return to work at this point.  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      Given her residual pain I will go ahead and order a new MRI and x-rays and have her follow-up thereafter.  I told her that we might need to consider a referral to pain management for either medical options for injections given that at this point she does not feel that  her pain is managed enough to allow her to become more active and return to her usual daily activities.  In addition her insurance company will no longer cover any additional physical therapy as she has exhausted her benefits.  She can stay off work until she follows up after the MRI.  Oksana was seen today for post-op.    Diagnoses and all orders for this visit:    Surgery follow-up examination  -     MRI Lumbar Spine With & Without Contrast; Future  -     XR Spine Lumbar Complete With Flex & Ext; Future    Return for follow up after radiology test with Dr. Ojeda .

## 2017-07-12 ENCOUNTER — HOSPITAL ENCOUNTER (OUTPATIENT)
Dept: GENERAL RADIOLOGY | Facility: HOSPITAL | Age: 42
Discharge: HOME | End: 2017-07-12

## 2017-07-12 ENCOUNTER — HOSPITAL ENCOUNTER (OUTPATIENT)
Dept: MRI IMAGING | Facility: HOSPITAL | Age: 42
Discharge: HOME | End: 2017-07-12
Admitting: PHYSICIAN ASSISTANT

## 2017-07-12 DIAGNOSIS — Z09 SURGERY FOLLOW-UP EXAMINATION: ICD-10-CM

## 2017-07-12 PROCEDURE — 0 GADOBENATE DIMEGLUMINE 529 MG/ML SOLUTION: Performed by: PHYSICIAN ASSISTANT

## 2017-07-12 PROCEDURE — 72158 MRI LUMBAR SPINE W/O & W/DYE: CPT

## 2017-07-12 PROCEDURE — 72114 X-RAY EXAM L-S SPINE BENDING: CPT

## 2017-07-12 PROCEDURE — A9577 INJ MULTIHANCE: HCPCS | Performed by: PHYSICIAN ASSISTANT

## 2017-07-12 RX ADMIN — GADOBENATE DIMEGLUMINE 20 ML: 529 INJECTION, SOLUTION INTRAVENOUS at 15:00

## 2017-07-17 ENCOUNTER — OFFICE VISIT (OUTPATIENT)
Dept: NEUROSURGERY | Facility: CLINIC | Age: 42
End: 2017-07-17

## 2017-07-17 VITALS
BODY MASS INDEX: 32.02 KG/M2 | HEART RATE: 70 BPM | HEIGHT: 67 IN | SYSTOLIC BLOOD PRESSURE: 120 MMHG | DIASTOLIC BLOOD PRESSURE: 79 MMHG | WEIGHT: 204 LBS

## 2017-07-17 DIAGNOSIS — M51.17 INTERVERTEBRAL DISC DISORDER WITH RADICULOPATHY OF LUMBOSACRAL REGION: Primary | ICD-10-CM

## 2017-07-17 DIAGNOSIS — M62.81 CALF MUSCLE WEAKNESS: ICD-10-CM

## 2017-07-17 PROCEDURE — 99024 POSTOP FOLLOW-UP VISIT: CPT | Performed by: NEUROLOGICAL SURGERY

## 2017-07-17 RX ORDER — GABAPENTIN 300 MG/1
300 CAPSULE ORAL 2 TIMES DAILY
Qty: 90 CAPSULE | Refills: 3 | Status: SHIPPED | OUTPATIENT
Start: 2017-07-17 | End: 2017-07-28 | Stop reason: DRUGHIGH

## 2017-07-17 NOTE — PROGRESS NOTES
Subjective   Patient ID: Oksana Pillai is a 41 y.o. female is here today for follow-up after right L5-S1 METRx microdiscectomy on 4/21/17 with new MRI and x-rays of the lumbar spine.     The patient denies any changes to his pain symptoms at this time.      Back Pain   This is a chronic problem. The current episode started more than 1 month ago. The problem occurs daily. The problem has been gradually worsening since onset. Pertinent negatives include no fever.       The following portions of the patient's history were reviewed and updated as appropriate: allergies, current medications, past family history, past medical history, past social history, past surgical history and problem list.    Review of Systems   Constitutional: Negative for fever.   Musculoskeletal: Positive for back pain.   Neurological: Negative for syncope.   All other systems reviewed and are negative.    She is with her .  It's been about 3 months since her surgery.  The right calf weakness is improving.  But she was still having some radicular pain.  We discussed the lumbar MRI which didn't show any evidence of any recurrent herniated disc.  This is just slowness of the nerve to heal.  She's been doing her own exercises.  Including stretches.  She is on 200 mg twice a day of gabapentin.  We can certainly raise that to 300 mg twice a day.  She is talking to her workplace tomorrow about whether or not there be able to accommodate part-time work for a while such as 20 hours a week.  I be happy to release her to that if that's feasible.  They have purchased a desk where he can be elevated so that she is more comfortable.  Sitting still is tough.  Standing and walking around is better for her.    But I reassured her that we don't need to consider any kind of reoperative surgery at this point which relieved her.      Objective   Physical Exam   Constitutional: She is oriented to person, place, and time. She appears well-developed and  well-nourished.   HENT:   Head: Normocephalic and atraumatic.   Eyes: Conjunctivae and EOM are normal. Pupils are equal, round, and reactive to light.   Fundoscopic exam:       The right eye shows no papilledema. The right eye shows venous pulsations.        The left eye shows no papilledema. The left eye shows venous pulsations.   Neck: Carotid bruit is not present.   Neurological: She is oriented to person, place, and time. She has a normal Finger-Nose-Finger Test and a normal Heel to Shin Test. Gait normal.   Reflex Scores:       Tricep reflexes are 2+ on the right side and 2+ on the left side.       Bicep reflexes are 2+ on the right side and 2+ on the left side.       Brachioradialis reflexes are 2+ on the right side and 2+ on the left side.       Patellar reflexes are 2+ on the right side and 2+ on the left side.       Achilles reflexes are 2+ on the right side and 2+ on the left side.  Psychiatric: Her speech is normal.     Neurologic Exam     Mental Status   Oriented to person, place, and time.   Registration of memory: Good recent and remote memory.   Attention: normal. Concentration: normal.   Speech: speech is normal   Level of consciousness: alert  Knowledge: consistent with education.     Cranial Nerves     CN II   Visual fields full to confrontation.   Visual acuity: normal    CN III, IV, VI   Pupils are equal, round, and reactive to light.  Extraocular motions are normal.     CN V   Facial sensation intact.   Right corneal reflex: normal  Left corneal reflex: normal    CN VII   Facial expression full, symmetric.   Right facial weakness: none  Left facial weakness: none    CN VIII   Hearing: intact    CN IX, X   Palate: symmetric    CN XI   Right sternocleidomastoid strength: normal  Left sternocleidomastoid strength: normal    CN XII   Tongue: not atrophic  Tongue deviation: none    Motor Exam   Muscle bulk: normal  Right arm tone: normal  Left arm tone: normal  Right leg tone: normal  Left leg tone:  normal    Strength   Strength 5/5 except as noted.     Sensory Exam   Light touch normal.     Gait, Coordination, and Reflexes     Gait  Gait: normal    Coordination   Finger to nose coordination: normal  Heel to shin coordination: normal    Reflexes   Right brachioradialis: 2+  Left brachioradialis: 2+  Right biceps: 2+  Left biceps: 2+  Right triceps: 2+  Left triceps: 2+  Right patellar: 2+  Left patellar: 2+  Right achilles: 2+  Left achilles: 2+  Right : 2+  Left : 2+      Assessment/Plan   Independent Review of Radiographic Studies:    I reviewed her lumbar MRI and plain x-rays.  There is no evidence of any recurrent herniated disc.  The right L5-S1 level shows postoperative changes and no root compression.  No instability.  Agree with the report.      Medical Decision Making:    I think this is slowness of the nerve to heal.  We did give it more time.  We'll increase her gabapentin to 300 mg twice a day.  She will call us in a week or so and if that is not sufficient, we can raise it to 600 mg twice a day.  She will let us know tomorrow what her workplace will accommodate.  If they will let her go back to work part-time such as 20 hours a week, we can release her to that.  She will let us know tomorrow.  I will see her otherwise in one month.      Oksana was seen today for back pain and post-op.    Diagnoses and all orders for this visit:    Intervertebral disc disorder with radiculopathy of lumbosacral region    Calf muscle weakness    Other orders  -     gabapentin (NEURONTIN) 300 MG capsule; Take 1 capsule by mouth 2 (Two) Times a Day.    Return in about 4 weeks (around 8/14/2017).

## 2017-07-19 ENCOUNTER — TELEPHONE (OUTPATIENT)
Dept: NEUROSURGERY | Facility: CLINIC | Age: 42
End: 2017-07-19

## 2017-07-19 NOTE — TELEPHONE ENCOUNTER
Patient called and wanted to see if you would be ok with her being off for 4 more weeks? Her employer is only going to give her 4 more weeks then if she wants to keep that job she has to go back to work after her next office visit. She thought 4 more weeks would give her time to heal and for some more of the pain to go away. Please advise.

## 2017-07-24 ENCOUNTER — TELEPHONE (OUTPATIENT)
Dept: NEUROSURGERY | Facility: CLINIC | Age: 42
End: 2017-07-24

## 2017-07-24 NOTE — TELEPHONE ENCOUNTER
I called the patient to get her payment for her forms and she stated she was supposed to f/u with you regarding her Gabapentin. Please call the patient back.

## 2017-07-26 ENCOUNTER — TELEPHONE (OUTPATIENT)
Dept: NEUROSURGERY | Facility: CLINIC | Age: 42
End: 2017-07-26

## 2017-07-26 NOTE — TELEPHONE ENCOUNTER
She is taking Gabapentin 300 mg TID.  She still having some tingling in right calf and foot.  She is only able to sit for short periods of time.  She is doing PT and they are working with the sensitivity to her nerves.        [phone]

## 2017-07-26 NOTE — TELEPHONE ENCOUNTER
How would you like to adjust her gabapentin?  She notes that she continues to have some tingling in her right calf and foot.

## 2017-07-27 NOTE — TELEPHONE ENCOUNTER
Spoke with the patient.  She will increase her gabapentin to 600 mg BID over a 5 day titration.    The patient notes that she filed for long term disability and then yesterday her job discussed terminating her if she does so.  She is still trying to figure out exactly what is going on with her job, but I advised that she could not return to work full time at this point.  Per Dr. Ojeda's last note, she can return for up to 20 hours per week.  She is still discussing options with her job, but we will try to accommodate her in any way we can to help out with this process.

## 2017-07-28 RX ORDER — GABAPENTIN 600 MG/1
600 TABLET ORAL 3 TIMES DAILY
Qty: 180 TABLET | Refills: 2
Start: 2017-07-28 | End: 2017-08-21 | Stop reason: SDUPTHER

## 2017-08-21 ENCOUNTER — OFFICE VISIT (OUTPATIENT)
Dept: NEUROSURGERY | Facility: CLINIC | Age: 42
End: 2017-08-21

## 2017-08-21 VITALS
WEIGHT: 206 LBS | HEART RATE: 70 BPM | SYSTOLIC BLOOD PRESSURE: 114 MMHG | BODY MASS INDEX: 32.33 KG/M2 | HEIGHT: 67 IN | DIASTOLIC BLOOD PRESSURE: 82 MMHG

## 2017-08-21 DIAGNOSIS — M62.81 CALF MUSCLE WEAKNESS: ICD-10-CM

## 2017-08-21 DIAGNOSIS — M51.17 INTERVERTEBRAL DISC DISORDER WITH RADICULOPATHY OF LUMBOSACRAL REGION: Primary | ICD-10-CM

## 2017-08-21 PROCEDURE — 99213 OFFICE O/P EST LOW 20 MIN: CPT | Performed by: NEUROLOGICAL SURGERY

## 2017-08-21 RX ORDER — GABAPENTIN 600 MG/1
TABLET ORAL
Qty: 270 TABLET | Refills: 2 | Status: SHIPPED | OUTPATIENT
Start: 2017-08-21 | End: 2018-02-05 | Stop reason: SDUPTHER

## 2017-08-21 NOTE — PROGRESS NOTES
Subjective   Patient ID: Oksana Pillai is a 41 y.o. female is here today for follow-up right L5-S1 METRx microdiscectomy on 4/21/17.    At the patient's last visit, her gabapentin was increased to 300 mg BID.    Since her last visit, she contacted the office and her gabapentin was increased to 600 mg BID.    Today, the patient notes that she continues on gabapentin 600 mg BID.  She notes that she continues to have pain, but notes that the gabapentin helps with this.  She notes that prolonged sitting will increase her pain symptoms.  She has returned to work full time.  She notes that her pain symptoms are worse in the mornings.    She notes that she has a new standup desk and notes that this has helped, but she does continue to have pain.     Back Pain   This is a chronic problem. The current episode started more than 1 month ago. The problem occurs daily. The problem has been gradually improving since onset. Pertinent negatives include no fever.       The following portions of the patient's history were reviewed and updated as appropriate: allergies, current medications, past family history, past medical history, past social history, past surgical history and problem list.    Review of Systems   Constitutional: Negative for fever.   Musculoskeletal: Positive for back pain.   Neurological: Negative for syncope.   All other systems reviewed and are negative.    Is been about 4 months since her right L5-S1 microdiscectomy.  She is a bit better although she still has pain.  She is up to 600 mg twice a day of gabapentin.  She had to return to work for fear of losing her job.  She does have the adjustable desk.  Moving around tends to help her low bit more.  But she is better slowly.  We will increase the gabapentin to 600/1200 and follow-up with her in 12 weeks.  I reminded her that there is no evidence of any recurrent or persistent disc herniation and the nerve pain should improve slowly over time.      Objective    Physical Exam   Constitutional: She is oriented to person, place, and time. She appears well-developed and well-nourished.   HENT:   Head: Normocephalic and atraumatic.   Eyes: Conjunctivae and EOM are normal. Pupils are equal, round, and reactive to light.   Fundoscopic exam:       The right eye shows no papilledema. The right eye shows venous pulsations.        The left eye shows no papilledema. The left eye shows venous pulsations.   Neck: Carotid bruit is not present.   Neurological: She is oriented to person, place, and time. She has a normal Finger-Nose-Finger Test and a normal Heel to Shin Test. Gait normal.   Reflex Scores:       Tricep reflexes are 2+ on the right side and 2+ on the left side.       Bicep reflexes are 2+ on the right side and 2+ on the left side.       Brachioradialis reflexes are 2+ on the right side and 2+ on the left side.       Patellar reflexes are 2+ on the right side and 2+ on the left side.       Achilles reflexes are 2+ on the right side and 2+ on the left side.  Psychiatric: Her speech is normal.     Neurologic Exam     Mental Status   Oriented to person, place, and time.   Registration of memory: Good recent and remote memory.   Attention: normal. Concentration: normal.   Speech: speech is normal   Level of consciousness: alert  Knowledge: consistent with education.     Cranial Nerves     CN II   Visual fields full to confrontation.   Visual acuity: normal    CN III, IV, VI   Pupils are equal, round, and reactive to light.  Extraocular motions are normal.     CN V   Facial sensation intact.   Right corneal reflex: normal  Left corneal reflex: normal    CN VII   Facial expression full, symmetric.   Right facial weakness: none  Left facial weakness: none    CN VIII   Hearing: intact    CN IX, X   Palate: symmetric    CN XI   Right sternocleidomastoid strength: normal  Left sternocleidomastoid strength: normal    CN XII   Tongue: not atrophic  Tongue deviation: none    Motor Exam    Muscle bulk: normal  Right arm tone: normal  Left arm tone: normal  Right leg tone: normal  Left leg tone: normal    Strength   Strength 5/5 except as noted.     Sensory Exam   Light touch normal.     Gait, Coordination, and Reflexes     Gait  Gait: normal    Coordination   Finger to nose coordination: normal  Heel to shin coordination: normal    Reflexes   Right brachioradialis: 2+  Left brachioradialis: 2+  Right biceps: 2+  Left biceps: 2+  Right triceps: 2+  Left triceps: 2+  Right patellar: 2+  Left patellar: 2+  Right achilles: 2+  Left achilles: 2+  Right : 2+  Left : 2+      Assessment/Plan   Independent Review of Radiographic Studies:    I reviewed again the last MRI which showed no evidence of any recurrent disc herniation on the right at L5-S1.      Medical Decision Making:    She is making some progress.  However, we'll change her gabapentin to 600/1200 Zane she'll continue working and doing her exercises.  I'll see her in 6 weeks.      Oksana was seen today for back pain.    Diagnoses and all orders for this visit:    Intervertebral disc disorder with radiculopathy of lumbosacral region    Calf muscle weakness    Other orders  -     gabapentin (NEURONTIN) 600 MG tablet; Take 1 tablet in AM, 2 tablets in PM    Return in about 6 weeks (around 10/2/2017).

## 2017-08-31 ENCOUNTER — TELEPHONE (OUTPATIENT)
Dept: NEUROSURGERY | Facility: CLINIC | Age: 42
End: 2017-08-31

## 2017-09-08 ENCOUNTER — TELEPHONE (OUTPATIENT)
Dept: RETAIL CLINIC | Facility: CLINIC | Age: 42
End: 2017-09-08

## 2017-09-08 ENCOUNTER — TELEPHONE (OUTPATIENT)
Dept: NEUROSURGERY | Facility: CLINIC | Age: 42
End: 2017-09-08

## 2017-09-08 NOTE — TELEPHONE ENCOUNTER
Patient called answering service regarding her concerns on her increased medications. I tried calling her back to see which medication was increased and to ask her a few questions before sending this message to the doctor.

## 2017-09-14 ENCOUNTER — APPOINTMENT (OUTPATIENT)
Dept: WOMENS IMAGING | Facility: HOSPITAL | Age: 42
End: 2017-09-14

## 2017-09-14 PROCEDURE — 77067 SCR MAMMO BI INCL CAD: CPT | Performed by: RADIOLOGY

## 2017-10-04 ENCOUNTER — OFFICE VISIT (OUTPATIENT)
Dept: NEUROSURGERY | Facility: CLINIC | Age: 42
End: 2017-10-04

## 2017-10-04 VITALS
DIASTOLIC BLOOD PRESSURE: 78 MMHG | HEART RATE: 61 BPM | SYSTOLIC BLOOD PRESSURE: 116 MMHG | BODY MASS INDEX: 32.33 KG/M2 | HEIGHT: 67 IN | WEIGHT: 206 LBS

## 2017-10-04 DIAGNOSIS — M51.17 INTERVERTEBRAL DISC DISORDER WITH RADICULOPATHY OF LUMBOSACRAL REGION: Primary | ICD-10-CM

## 2017-10-04 DIAGNOSIS — M62.81 CALF MUSCLE WEAKNESS: ICD-10-CM

## 2017-10-04 PROCEDURE — 99213 OFFICE O/P EST LOW 20 MIN: CPT | Performed by: NEUROLOGICAL SURGERY

## 2017-10-04 NOTE — PROGRESS NOTES
Subjective   Patient ID: Oksana Pillai is a 42 y.o. female is here today for follow-up on back pain.    At her last visit the patient was still taking Gabapentin 600 mg 1 tablet in the AM and 2 tablets at night. She stated that prolonged sitting increases her pain.    Today the patient states that since her last visit she has started with a new physical therapist, Dr Naheed Rodriguez. She states that Dr. Rodriguez has mentioned that there may be some atrophy in the right foot. She is still having back and right foot pain. She states that her pain is worse in the AM.    Back Pain   This is a chronic problem. The current episode started more than 1 year ago. The problem occurs daily. The pain is present in the lumbar spine. Pertinent negatives include no bladder incontinence or bowel incontinence.       The following portions of the patient's history were reviewed and updated as appropriate: allergies, current medications, past family history, past medical history, past social history, past surgical history and problem list.    Review of Systems   Gastrointestinal: Negative for bowel incontinence.   Genitourinary: Negative for bladder incontinence.   Musculoskeletal: Positive for back pain.   All other systems reviewed and are negative.    She is about 6 months out from her right L5-S1 microdiskectomy.  She has made some improvements in terms of her right buttock and leg pain.  She went back to work and is tolerating it reasonably well.  She is on 600/1200 of gabapentin.  She has no significant side effects and is willing to continue taking it.  She still has weakness in the calf and in the foot and she has been working with a new physical therapist to help address that as well as to work on core strengthening.  I think that is all fine.  Apparently, her insurance is no longer paying for the physical therapy.  She wanted to know if I would be willing to write a letter trying to appeal that.  I would be happy to, but I told her  chances are it will not be approved and she will have to wait until 2018 to have more physical therapy visits.  Will continue to follow her and I will see her in 4 months.                   Objective   Physical Exam   Constitutional: She is oriented to person, place, and time. She appears well-developed and well-nourished.   HENT:   Head: Normocephalic and atraumatic.   Eyes: Conjunctivae and EOM are normal. Pupils are equal, round, and reactive to light.   Fundoscopic exam:       The right eye shows no papilledema. The right eye shows venous pulsations.        The left eye shows no papilledema. The left eye shows venous pulsations.   Neck: Carotid bruit is not present.   Neurological: She is oriented to person, place, and time. She has a normal Finger-Nose-Finger Test and a normal Heel to Shin Test. Gait normal.   Reflex Scores:       Tricep reflexes are 2+ on the right side and 2+ on the left side.       Bicep reflexes are 2+ on the right side and 2+ on the left side.       Brachioradialis reflexes are 2+ on the right side and 2+ on the left side.       Patellar reflexes are 2+ on the right side and 2+ on the left side.       Achilles reflexes are 2+ on the right side and 2+ on the left side.  Psychiatric: Her speech is normal.     Neurologic Exam     Mental Status   Oriented to person, place, and time.   Registration of memory: Good recent and remote memory.   Attention: normal. Concentration: normal.   Speech: speech is normal   Level of consciousness: alert  Knowledge: consistent with education.     Cranial Nerves     CN II   Visual fields full to confrontation.   Visual acuity: normal    CN III, IV, VI   Pupils are equal, round, and reactive to light.  Extraocular motions are normal.     CN V   Facial sensation intact.   Right corneal reflex: normal  Left corneal reflex: normal    CN VII   Facial expression full, symmetric.   Right facial weakness: none  Left facial weakness: none    CN VIII   Hearing:  intact    CN IX, X   Palate: symmetric    CN XI   Right sternocleidomastoid strength: normal  Left sternocleidomastoid strength: normal    CN XII   Tongue: not atrophic  Tongue deviation: none    Motor Exam   Muscle bulk: normal  Right arm tone: normal  Left arm tone: normal  Right leg tone: normal  Left leg tone: normal    Strength   Strength 5/5 except as noted.     Sensory Exam   Light touch normal.     Gait, Coordination, and Reflexes     Gait  Gait: normal    Coordination   Finger to nose coordination: normal  Heel to shin coordination: normal    Reflexes   Right brachioradialis: 2+  Left brachioradialis: 2+  Right biceps: 2+  Left biceps: 2+  Right triceps: 2+  Left triceps: 2+  Right patellar: 2+  Left patellar: 2+  Right achilles: 2+  Left achilles: 2+  Right : 2+  Left : 2+      Assessment/Plan   Independent Review of Radiographic Studies:    I have reviewed her most recent MRI which was postoperatively done, which showed no evidence of any recurrent herniated disk on the right at L5-S1.       Medical Decision Making:    All of her questions were answered.  I told her to continue working on her core strengthening and to stay on the gabapentin at 600/1200.  She will provide a name or department of someone that I can address to try to convince her insurance to reimburse for more physical therapy, although I did tell her that I am not optimistic about being successful about that.  We will see her in 4 months.  She continues to work.           Oksana was seen today for back pain.    Diagnoses and all orders for this visit:    Intervertebral disc disorder with radiculopathy of lumbosacral region    Calf muscle weakness    Return in about 4 months (around 2/4/2018).

## 2018-02-05 ENCOUNTER — OFFICE VISIT (OUTPATIENT)
Dept: NEUROSURGERY | Facility: CLINIC | Age: 43
End: 2018-02-05

## 2018-02-05 VITALS
SYSTOLIC BLOOD PRESSURE: 108 MMHG | HEIGHT: 67 IN | BODY MASS INDEX: 32.33 KG/M2 | WEIGHT: 206 LBS | HEART RATE: 64 BPM | DIASTOLIC BLOOD PRESSURE: 71 MMHG

## 2018-02-05 DIAGNOSIS — G89.29 CHRONIC BILATERAL LOW BACK PAIN WITH RIGHT-SIDED SCIATICA: ICD-10-CM

## 2018-02-05 DIAGNOSIS — M51.36 DDD (DEGENERATIVE DISC DISEASE), LUMBAR: Primary | ICD-10-CM

## 2018-02-05 DIAGNOSIS — M54.41 CHRONIC BILATERAL LOW BACK PAIN WITH RIGHT-SIDED SCIATICA: ICD-10-CM

## 2018-02-05 DIAGNOSIS — M62.81 CALF MUSCLE WEAKNESS: ICD-10-CM

## 2018-02-05 PROBLEM — M51.369 DDD (DEGENERATIVE DISC DISEASE), LUMBAR: Status: ACTIVE | Noted: 2018-02-05

## 2018-02-05 PROCEDURE — 99213 OFFICE O/P EST LOW 20 MIN: CPT | Performed by: NEUROLOGICAL SURGERY

## 2018-02-05 RX ORDER — GABAPENTIN 600 MG/1
TABLET ORAL
Qty: 120 TABLET | Refills: 3 | Status: SHIPPED | OUTPATIENT
Start: 2018-02-05 | End: 2018-06-16 | Stop reason: SDUPTHER

## 2018-02-05 NOTE — PROGRESS NOTES
Subjective   Patient ID: Oksana Pillai is a 42 y.o. female is here today for follow-up on back pain.    At the patient's last visit she reported still having back and right foot pain that's worse in the morning.    Today the patient reports that there have been no changes in her symptoms since the last visit. She complains of new onset right heel pain. Patient states that she has been doing physical therapy and has noticed slight improvement.     Back Pain   This is a chronic problem. The current episode started more than 1 year ago. The problem occurs daily. The problem is unchanged. The pain is present in the lumbar spine. Associated symptoms include leg pain, numbness, tingling and weakness. Pertinent negatives include no bladder incontinence or bowel incontinence.       The following portions of the patient's history were reviewed and updated as appropriate: allergies, current medications, past family history, past medical history, past social history, past surgical history and problem list.    Review of Systems   Gastrointestinal: Negative for bowel incontinence.   Genitourinary: Negative for bladder incontinence.   Musculoskeletal: Positive for back pain.   Neurological: Positive for tingling, weakness and numbness.   All other systems reviewed and are negative.    It is been about 9 months since her surgery.  She wasn't able to renew her therapy for last year and needs a renewal now.  She is been trying to lose weight and continues to work.  She still has back pain and occasional nerve pain but her strength is better although not completely back to normal in the left calf.  We'll continue therapy.  We'll increase her gabapentin from 600/0413-1642/1200 and see her in 4 months.  Reassurance and encouragement was provided.      Objective   Physical Exam   Constitutional: She is oriented to person, place, and time. She appears well-developed and well-nourished.   HENT:   Head: Normocephalic and atraumatic.   Eyes:  Conjunctivae and EOM are normal. Pupils are equal, round, and reactive to light.   Fundoscopic exam:       The right eye shows no papilledema. The right eye shows venous pulsations.        The left eye shows no papilledema. The left eye shows venous pulsations.   Neck: Carotid bruit is not present.   Neurological: She is oriented to person, place, and time. She has a normal Finger-Nose-Finger Test and a normal Heel to Shin Test. Gait normal.   Reflex Scores:       Tricep reflexes are 2+ on the right side and 2+ on the left side.       Bicep reflexes are 2+ on the right side and 2+ on the left side.       Brachioradialis reflexes are 2+ on the right side and 2+ on the left side.       Patellar reflexes are 2+ on the right side and 2+ on the left side.       Achilles reflexes are 2+ on the right side and 2+ on the left side.  Psychiatric: Her speech is normal.     Neurologic Exam     Mental Status   Oriented to person, place, and time.   Registration of memory: Good recent and remote memory.   Attention: normal. Concentration: normal.   Speech: speech is normal   Level of consciousness: alert  Knowledge: consistent with education.     Cranial Nerves     CN II   Visual fields full to confrontation.   Visual acuity: normal    CN III, IV, VI   Pupils are equal, round, and reactive to light.  Extraocular motions are normal.     CN V   Facial sensation intact.   Right corneal reflex: normal  Left corneal reflex: normal    CN VII   Facial expression full, symmetric.   Right facial weakness: none  Left facial weakness: none    CN VIII   Hearing: intact    CN IX, X   Palate: symmetric    CN XI   Right sternocleidomastoid strength: normal  Left sternocleidomastoid strength: normal    CN XII   Tongue: not atrophic  Tongue deviation: none    Motor Exam   Muscle bulk: normal  Right arm tone: normal  Left arm tone: normal  Right leg tone: normal  Left leg tone: normal    Strength   Strength 5/5 except as noted.     Sensory Exam    Light touch normal.     Gait, Coordination, and Reflexes     Gait  Gait: normal    Coordination   Finger to nose coordination: normal  Heel to shin coordination: normal    Reflexes   Right brachioradialis: 2+  Left brachioradialis: 2+  Right biceps: 2+  Left biceps: 2+  Right triceps: 2+  Left triceps: 2+  Right patellar: 2+  Left patellar: 2+  Right achilles: 2+  Left achilles: 2+  Right : 2+  Left : 2+      Assessment/Plan   Independent Review of Radiographic Studies:    The postoperative lumbar MRI done in July 2017 showed no evidence of any recurrent herniated disc on the right at L5-S1.      Medical Decision Making:    She's making slow progress but we simply need to give it more time.  We will renew physical therapy and raise her gabapentin to 1200/1200 and see her in 4 months.  She continues to work and is trying to lose weight.      Oksana was seen today for back pain.    Diagnoses and all orders for this visit:    DDD (degenerative disc disease), lumbar  -     Ambulatory Referral to Physical Therapy Evaluate and treat    Calf muscle weakness  -     Ambulatory Referral to Physical Therapy Evaluate and treat    Chronic bilateral low back pain with right-sided sciatica  -     Ambulatory Referral to Physical Therapy Evaluate and treat    Other orders  -     gabapentin (NEURONTIN) 600 MG tablet; Take 2 tablets in AM, 2 tablets in PM    Return in about 4 months (around 6/5/2018).

## 2018-02-19 ENCOUNTER — OFFICE VISIT (OUTPATIENT)
Dept: INTERNAL MEDICINE | Facility: CLINIC | Age: 43
End: 2018-02-19

## 2018-02-19 VITALS
HEART RATE: 75 BPM | DIASTOLIC BLOOD PRESSURE: 74 MMHG | WEIGHT: 204 LBS | HEIGHT: 67 IN | RESPIRATION RATE: 14 BRPM | SYSTOLIC BLOOD PRESSURE: 108 MMHG | TEMPERATURE: 98.7 F | OXYGEN SATURATION: 98 % | BODY MASS INDEX: 32.02 KG/M2

## 2018-02-19 DIAGNOSIS — Z13.29 SCREENING FOR HYPOTHYROIDISM: ICD-10-CM

## 2018-02-19 DIAGNOSIS — Z13.220 SCREENING FOR HYPERLIPIDEMIA: ICD-10-CM

## 2018-02-19 DIAGNOSIS — M51.17 INTERVERTEBRAL DISC DISORDER WITH RADICULOPATHY OF LUMBOSACRAL REGION: ICD-10-CM

## 2018-02-19 DIAGNOSIS — Z98.890 HISTORY OF COLONOSCOPY WITH POLYPECTOMY: ICD-10-CM

## 2018-02-19 DIAGNOSIS — Z13.1 SCREENING FOR DIABETES MELLITUS: ICD-10-CM

## 2018-02-19 DIAGNOSIS — Z86.010 HISTORY OF COLONOSCOPY WITH POLYPECTOMY: ICD-10-CM

## 2018-02-19 DIAGNOSIS — F32.A ANXIETY AND DEPRESSION: Primary | ICD-10-CM

## 2018-02-19 DIAGNOSIS — R29.818 SUSPECTED SLEEP APNEA: ICD-10-CM

## 2018-02-19 DIAGNOSIS — M62.81 CALF MUSCLE WEAKNESS: ICD-10-CM

## 2018-02-19 DIAGNOSIS — F41.9 ANXIETY AND DEPRESSION: Primary | ICD-10-CM

## 2018-02-19 PROBLEM — Z86.0100 HISTORY OF COLONOSCOPY WITH POLYPECTOMY: Status: ACTIVE | Noted: 2018-02-19

## 2018-02-19 PROBLEM — Z09 SURGERY FOLLOW-UP EXAMINATION: Status: RESOLVED | Noted: 2017-04-21 | Resolved: 2018-02-19

## 2018-02-19 PROBLEM — J30.9 ALLERGIC RHINITIS: Status: ACTIVE | Noted: 2018-02-19

## 2018-02-19 PROCEDURE — 99214 OFFICE O/P EST MOD 30 MIN: CPT | Performed by: INTERNAL MEDICINE

## 2018-02-19 RX ORDER — SERTRALINE HYDROCHLORIDE 100 MG/1
200 TABLET, FILM COATED ORAL DAILY
Qty: 90 TABLET | Refills: 1 | Status: SHIPPED | OUTPATIENT
Start: 2018-02-19 | End: 2018-10-11

## 2018-02-19 NOTE — PROGRESS NOTES
Subjective   Oksana Pillai is a 42 y.o. female.     History of Present Illness     The following portions of the patient's history were reviewed and updated as appropriate: allergies, current medications, past family history, past medical history, past social history, past surgical history and problem list.     Oksana Pillai is a 42 y.o. female who presents to [facility]. All of her problems are new to me. In April 2017, she had a R L5-S1 metrx microdiscectomy with Dr. Ojeda.  Sees PT again (holistic therapy) this week.     Had c-scope after episode of GIB.  Polyp noted in 2015. This was with Dr. Avendano who felt that no follow up was needed. No f/u per pt.  Pt is adopted.       has noted snoring.  She has been more concerning for the last 3-6 months.  In the past month it has woken her up.  She has felt tired during the day but attributed this to her child sleeping in bed with her.  She has HA's during the day but attributes it to TMJ.  No falling asleep while driving.  She could fall asleep easily during the day.  She does not wake up gasping for breath.  No sleep studies in past.    Got flu vaccine this year.   She goes to [facility] for paps/mammogram (last sept)--> normal   In 2012, maybe last tetanus booster.      Needs rf on sertraline (90d, send to [facility] in [place]). She feels that she is not focusing as well as she used to. She has been been on 150mg for some time and would like to consider increasing.     Review of Systems   Constitutional: Negative for chills, diaphoresis and fever.   HENT: Negative for congestion and rhinorrhea (lingering cold for 3-4 days).    Eyes: Negative for pain and discharge.   Respiratory: Negative for chest tightness, shortness of breath and wheezing.    Cardiovascular: Negative for chest pain, palpitations and leg swelling.   Gastrointestinal: Negative for abdominal pain, blood in stool, constipation, diarrhea, nausea and vomiting.   Endocrine: Negative for cold  intolerance and heat intolerance.   Genitourinary: Negative for dysuria, hematuria and menstrual problem.   Musculoskeletal: Positive for back pain. Negative for neck pain.   Skin: Negative for rash and wound.   Neurological: Positive for weakness (RLE) and numbness (right little toe). Negative for dizziness, seizures, syncope (history of passing out 2/2 stress), light-headedness and headaches.   Hematological: Negative for adenopathy. Does not bruise/bleed easily.   Psychiatric/Behavioral: Negative for sleep disturbance and suicidal ideas.       Objective   Physical Exam   Constitutional: She is oriented to person, place, and time. She appears well-developed and well-nourished. No distress.   HENT:   Head: Normocephalic and atraumatic.   Right Ear: External ear normal.   Left Ear: External ear normal.   Nose: Nose normal.   Mouth/Throat: Oropharynx is clear and moist. No oropharyngeal exudate.   Eyes: Conjunctivae and EOM are normal. Pupils are equal, round, and reactive to light. Right eye exhibits no discharge. Left eye exhibits no discharge. No scleral icterus.   Neck: Normal range of motion. Neck supple. No thyromegaly present.   Cardiovascular: Normal rate, regular rhythm and normal heart sounds.  Exam reveals no gallop and no friction rub.    No murmur heard.  Pulmonary/Chest: Effort normal and breath sounds normal. No respiratory distress. She has no wheezes. She has no rales. She exhibits no tenderness.   Abdominal: Soft. Bowel sounds are normal. She exhibits no distension and no mass. There is no tenderness. There is no rebound and no guarding. No hernia.   Musculoskeletal: Normal range of motion. She exhibits no tenderness.   Lymphadenopathy:     She has no cervical adenopathy.   Neurological: She is alert and oriented to person, place, and time. She exhibits normal muscle tone.   Skin: Skin is warm and dry. No rash noted. She is not diaphoretic.   Psychiatric: She has a normal mood and affect. Her  behavior is normal.   Nursing note and vitals reviewed.      Assessment/Plan   Oksana was seen today for establish care and snoring.    Diagnoses and all orders for this visit:    Anxiety and depression    Intervertebral disc disorder with radiculopathy of lumbosacral region    Calf muscle weakness    Suspected sleep apnea  -     Ambulatory Referral to Sleep Medicine  -     CBC & Differential; Future    History of colonoscopy with polypectomy    Screening for hypothyroidism  -     TSH; Future  -     T4, Free; Future    Screening for hyperlipidemia  -     Lipid Panel With LDL / HDL Ratio; Future    Screening for diabetes mellitus  -     Comprehensive Metabolic Panel; Future  -     Urinalysis With / Culture If Indicated - Urine, Clean Catch; Future    Other orders  -     Discontinue: sertraline (ZOLOFT) 50 MG tablet; Take 3 tablets by mouth Daily.  -     sertraline (ZOLOFT) 100 MG tablet; Take 2 tablets by mouth Daily.      Oksana Pillai is a 42 y.o. female     Chronic lower back pain and R-calf weakness:  - On neurontin 600 BID and seeing CATIE  - To see PT    Dep/Anx:  - Will refill sertraline but increase dose to 200mg as pt notes symptoms are not well controlled    Concern for REJI:  - will refer for sleep study    HM:  - Will obtain records on colon polyp from Dr. Avendano   - [facility] in 4 months for physical and labs (CBC, CMP, FLP, HA1c, Vit D, TSH/FT4)  - will have her sign release for c-scope records    Gian Robbins MD  Resident provider   PGY-4  IM/Ped           Reviewed residents assessment and plana and documentation and agree with him.    Will increase Zoloft to 200mg and patient to call if she has side effects or not doing better. I am hoping that sleep study will help as well in terms of helping determine other causes for her tiredness and difficulty focusing.     Continue to be followed by Dr. MENDES for her low back pain. He is managing her gabapentin .

## 2018-04-25 ENCOUNTER — APPOINTMENT (OUTPATIENT)
Dept: SLEEP MEDICINE | Facility: HOSPITAL | Age: 43
End: 2018-04-25
Attending: INTERNAL MEDICINE

## 2018-05-04 ENCOUNTER — OFFICE VISIT (OUTPATIENT)
Dept: SLEEP MEDICINE | Facility: HOSPITAL | Age: 43
End: 2018-05-04
Attending: INTERNAL MEDICINE

## 2018-05-04 VITALS
WEIGHT: 208 LBS | BODY MASS INDEX: 31.52 KG/M2 | HEIGHT: 68 IN | HEART RATE: 59 BPM | SYSTOLIC BLOOD PRESSURE: 103 MMHG | DIASTOLIC BLOOD PRESSURE: 62 MMHG

## 2018-05-04 DIAGNOSIS — E66.9 OBESITY (BMI 30-39.9): ICD-10-CM

## 2018-05-04 DIAGNOSIS — R29.818 SUSPECTED SLEEP APNEA: Primary | ICD-10-CM

## 2018-05-04 DIAGNOSIS — G47.10 HYPERSOMNIA WITH SLEEP APNEA: ICD-10-CM

## 2018-05-04 DIAGNOSIS — G47.61 PLMD (PERIODIC LIMB MOVEMENT DISORDER): ICD-10-CM

## 2018-05-04 DIAGNOSIS — M26.609 TMJ (TEMPOROMANDIBULAR JOINT DISORDER): ICD-10-CM

## 2018-05-04 DIAGNOSIS — G47.63 SLEEP-RELATED BRUXISM: ICD-10-CM

## 2018-05-04 DIAGNOSIS — G47.30 HYPERSOMNIA WITH SLEEP APNEA: ICD-10-CM

## 2018-05-04 PROCEDURE — G0463 HOSPITAL OUTPT CLINIC VISIT: HCPCS

## 2018-05-04 NOTE — PROGRESS NOTES
Sleep Consultation    Patient Name: Oksana Pillai  Age/Sex: 42 y.o. female  : 1975  MRN: [id number]    Date of Encounter Visit: 2018  Encounter Provider: Tamar Troncoso MD  Referring Provider: Tamar Troncoso MD  Place of Service: [facility]  Patient Care Team:  Sona Orellana MD as PCP - General (Internal Medicine)  Brian Ojeda MD as Surgeon (Neurosurgery)  Marissa Avendano MD as Consulting Physician (Gastroenterology)  Kristyn MENDES MD as Consulting Physician (Obstetrics and Gynecology)    Subjective:     Reason for Consult: evaluation for REJI    History of Present Illness:  Oksana Pillai is a 42 y.o. female is here for evaluation of REJI. She does suffer from TMJ and was followed regularly by Dr. Bautista  He did suggest to her to get her REJI evaluated but she was pregnant at the time and she deferred it till now.  Patient has to wear a mouthguard at night to help with the TMJ problem, she does have nocturnal bruxism and that is better with the mouth retainer.  She does have snoring at night with witnessed apnea was history strongly suggestive of sleep apnea, she also have frequent jerking at night and possibility of periodic leg movement disorder.  Patient would like to have that evaluated and would like to be tried on treatment.    Please see sleep questionnaire on page 2 for more details.   Patient complains of daytime fatigue and sleepiness with an Forrest Sleepiness Scale (ESS) of 11.  Patient complains of jaw pain, night sweats, restless sleep, daytime fatigue, snoring, frequent leg jerking, morning headache.  Denies any symptoms of restless leg syndrome.   Patient denies any cataplexy, sleep paralysis or other symptoms to suggest narcolepsy.  Patient denies any parasomnias.  He had sleepwalking as a child which is a normal finding.  Denies any history of seizure disorder or recent head trauma.  Patient spends adequate amount of time in bed with no evidence of sleep  restriction or improper sleep hygiene.  Patient is getting around 7-8 hours of sleep depending on the weekdays versus the weekends.  Comorbidities include: TMJ problem which can be a challenge if the patient needed a mandibular advancing device.    Review of Systems:   A twelve-system review was conducted and was negative except for postnasal drainage, sciatica and low back pain, she has a dizzy spell from excessive anxiety, she has history of rash and she has excessive thirst .   Please refer to page 4 of on the sleep questionnaire for more details on system review findings.    Past Medical History:  Past Medical History:   Diagnosis Date   • Allergic rhinitis    • Anxiety    • Depression    • GI bleed 2015    Polyp found on  c-scope and GI bleed during delivery of last baby    • History of colonoscopy with polypectomy 2/19/2018   • Lumbar herniated disc    • Numbness     RIGHT FOOT-FIRST THREE TOES   • Sciatica    • TMJ pain dysfunction syndrome        Past Surgical History:   Procedure Laterality Date   • CHOLECYSTECTOMY     • COLONOSCOPY     • LUMBAR DISCECTOMY FUSION INSTRUMENTATION Right 4/21/2017    Procedure: Right L5/S1 Metrx Microdiscectomy;  Surgeon: Brian Ojeda MD;  Location: [facility];  Service:        Home Medications:     Current Outpatient Prescriptions:   •  cyclobenzaprine (FLEXERIL) 10 MG tablet, 1/2-1 po TID prn spasm, Disp: 40 tablet, Rfl: 0  •  fexofenadine (ALLEGRA) 60 MG tablet, Take 60 mg by mouth Daily., Disp: , Rfl:   •  gabapentin (NEURONTIN) 600 MG tablet, Take 2 tablets in AM, 2 tablets in PM, Disp: 120 tablet, Rfl: 3  •  ibuprofen (ADVIL,MOTRIN) 800 MG tablet, , Disp: , Rfl:   •  levonorgestrel (MIRENA) 20 MCG/24HR IUD, 1 each by Intrauterine route 1 (One) Time., Disp: , Rfl:   •  loratadine (CLARITIN) 10 MG tablet, Take 10 mg by mouth Daily., Disp: , Rfl:   •  sertraline (ZOLOFT) 100 MG tablet, Take 2 tablets by mouth Daily., Disp: 90 tablet, Rfl: 1    Allergies:  No Known  "Allergies    Past Social History:  Social History     Social History   • Marital status:      Social History Main Topics   • Smoking status: Never Smoker   • Smokeless tobacco: Never Used   • Alcohol use 1.2 oz/week     2 Glasses of wine per week      Comment: SOCIAL   • Drug use: No   • Sexual activity: Yes     Partners: Male     Other Topics Concern   • Not on file       Past Family History:  Family History   Problem Relation Age of Onset   • Adopted: Yes   • Family history unknown: Yes       Objective:   Done and documented on sleep disorders center physical examination sheet, please refer to the hand written note on records for details about the pertinent negative findings.    Vital Signs:   Visit Vitals  /62   Pulse 59   Ht 171.5 cm (67.5\")   Wt 94.3 kg (208 lb)   BMI 32.10 kg/m²     Wt Readings from Last 3 Encounters:   05/04/18 94.3 kg (208 lb)   02/19/18 92.5 kg (204 lb)   02/05/18 93.4 kg (206 lb)     Neck Circumference: 14 inches    Physical Exam:   General: AAOx3. Normal mood and affect.   HEENT:  Conjunctiva normal.  PERRLA.  Moist mucous membranes.  Septum midline. Mallampati 4 airway.  Redundant membranous soft palate, she has Buccal Mucosa hypertrophy, large uvula   Neck: Neck supple. Trachea midline.  Normal thyroid.    LUNGS: Non-labored breathing. CTAB.  No wheezing.  HEART: regular rate and rhythm. No murmur. Normal s1/s2.  EXTREMITIES: trace edema. No cyanosis or clubbing. Normal gait.    Diagnostic Data:  No previous sleep studies on record  Previous evaluation by Dr. Bautista showed evidence of TMJ syndrome and previous evaluation by her dentist also showed evidence of bruxism    Assessment and Plan:       ICD-10-CM ICD-9-CM   1. Suspected sleep apnea G47.30 780.57   2. Obesity (BMI 30-39.9) E66.9 278.00   3. TMJ (temporomandibular joint disorder) M26.609 524.60   4. Hypersomnia with sleep apnea G47.10 780.53    G47.30    5. Sleep-related bruxism G47.63 327.53   6. PLMD (periodic " limb movement disorder) G47.61 327.51       Recommendations:       Patient was educated in depth about REJI and cardiovascular consequences if left untreated, including but not limited to CHF, CAD, arrhythmias, CVA, and/or HTN. Education also provided about the diagnostic tools for REJI, including the polysomnography and the treatment modalities, including the CPAP.     If patient has a mild obstructive sleep apnea will schedule a follow up to discuss treatment options including mandibular advancement device versus CPAP.    The TMJ might be a challenge as far as whether she would tolerate a mandibular advancement device so we might be left with the CPAP at the first option to try in case of abnormal finding even if it is a mild case of sleep apnea.  Patient has daytime fatigue and sleepiness so CPAP trial would be indicated even if she has a mild condition.  Patient has frequent leg jerking at night and the possibility of periodic leg movement and a sleep study would help to further evaluate that  Patient has nocturnal bruxism and this might be persistent even with the mouthguard, that can be monitored with the masseter muscle EMG recording during the end lab sleep study.  We did discuss the impact of weight on sleep apnea and the benefit of weight loss and weight loss is recommended.    If patient has moderate to severe sleep apnea the recommend treatment is CPAP and will start CPAP and patient will follow up within 31-90 days after starting CPAP for compliance review.     Adherence to the CPAP is a key factor in successful treatment of REJI and the patient was encouraged to contact us in case of problem with the CPAP or the mask that can limit the tolerance of the compliance with the therapy.    Orders Placed This Encounter   Procedures   • Polysomnography 4 or More Parameters       Return for 31 to 90 days after PAP setup.    Tamar Troncoso MD   [facility]   05/04/18  11:37 AM    Dictated utilizing  Renetta dictation:  Much of this encounter note is an electronic transcription/translation of spoken language to printed text. The electronic translation of spoken language may permit erroneous, or at times, nonsensical words or phrases to be inadvertently transcribed; Although I have reviewed the note for such errors, some may still exist.

## 2018-05-18 NOTE — PROGRESS NOTES
Subjective   Patient ID: Oksana Pillai is a 42 y.o. female is here today for follow-up on back pain.    At the last visit the patient reported that she had noticed some slight improvement after going to physical therapy. She reported new onset right heel pain. Her Gabapentin was raised to 1200/1200 mg at the last visit.    Today the patient reports that she is having some pain on the outer right foot. She has been unable to do physical therapy due to insurance changes but has been doing them at home. She states that she has some urine urgency that has worsened. She also states that she has been feeling more foggy and struggling to get words out since the last visit.    Back Pain   This is a chronic problem. The current episode started more than 1 year ago. The problem occurs daily. The problem is unchanged. The pain is present in the lumbar spine. The pain radiates to the right foot. Associated symptoms include leg pain, numbness, tingling and weakness. Pertinent negatives include no bladder incontinence or bowel incontinence.       The following portions of the patient's history were reviewed and updated as appropriate: allergies, current medications, past family history, past medical history, past social history, past surgical history and problem list.    Review of Systems   Gastrointestinal: Negative for bowel incontinence.   Genitourinary: Positive for urgency. Negative for bladder incontinence.   Musculoskeletal: Positive for back pain.   Neurological: Positive for tingling, weakness and numbness.   All other systems reviewed and are negative.    It has been about a year since her right L5-S1 minimally invasive microdiscectomy.  She has gotten all of the strength back in her calf and cane do a heel raise without any problems, but there is still some residual pain and she finds it difficult to sit for long periods of time.  She has not reentered the work force, but when she does, she asked if I would be willing to  write a letter indicating that having a [employer] in which she can switch from a sitting to a standing position would be helpful.  I think it is and I would be wiling to write a letter, but I told her it probably would be best to secure the job first before asking for that.  I told her to continue working on stretching exercises, planks and core stretching.  She is working on weight loss.  Her insurance would not pay for much more physical therapy, so she has been doing it on her own.  All told, she is better and the strength is back to normal and that is a positive.  I will see her in 6 months.             Objective   Physical Exam   Constitutional: She is oriented to person, place, and time. She appears well-developed and well-nourished.   HENT:   Head: Normocephalic and atraumatic.   Eyes: Conjunctivae and EOM are normal. Pupils are equal, round, and reactive to light.   Fundoscopic exam:       The right eye shows no papilledema. The right eye shows venous pulsations.        The left eye shows no papilledema. The left eye shows venous pulsations.   Neck: Carotid bruit is not present.   Neurological: She is oriented to person, place, and time. She has a normal Finger-Nose-Finger Test and a normal Heel to Shin Test. Gait normal.   Reflex Scores:       Tricep reflexes are 2+ on the right side and 2+ on the left side.       Bicep reflexes are 2+ on the right side and 2+ on the left side.       Brachioradialis reflexes are 2+ on the right side and 2+ on the left side.       Patellar reflexes are 2+ on the right side and 2+ on the left side.       Achilles reflexes are 2+ on the right side and 2+ on the left side.  Psychiatric: Her speech is normal.     Neurologic Exam     Mental Status   Oriented to person, place, and time.   Registration of memory: Good recent and remote memory.   Attention: normal. Concentration: normal.   Speech: speech is normal   Level of consciousness: alert  Knowledge: consistent with education.      Cranial Nerves     CN II   Visual fields full to confrontation.   Visual acuity: normal    CN III, IV, VI   Pupils are equal, round, and reactive to light.  Extraocular motions are normal.     CN V   Facial sensation intact.   Right corneal reflex: normal  Left corneal reflex: normal    CN VII   Facial expression full, symmetric.   Right facial weakness: none  Left facial weakness: none    CN VIII   Hearing: intact    CN IX, X   Palate: symmetric    CN XI   Right sternocleidomastoid strength: normal  Left sternocleidomastoid strength: normal    CN XII   Tongue: not atrophic  Tongue deviation: none    Motor Exam   Muscle bulk: normal  Right arm tone: normal  Left arm tone: normal  Right leg tone: normal  Left leg tone: normal    Strength   Strength 5/5 except as noted.     Sensory Exam   Light touch normal.     Gait, Coordination, and Reflexes     Gait  Gait: normal    Coordination   Finger to nose coordination: normal  Heel to shin coordination: normal    Reflexes   Right brachioradialis: 2+  Left brachioradialis: 2+  Right biceps: 2+  Left biceps: 2+  Right triceps: 2+  Left triceps: 2+  Right patellar: 2+  Left patellar: 2+  Right achilles: 2+  Left achilles: 2+  Right : 2+  Left : 2+      Assessment/Plan   Independent Review of Radiographic Studies:    I reviewed the postoperative MRI done in July 2017 which showed no evidence of any recurrent herniated disc on the right at L5-S1.      Medical Decision Making:    The strength has returned to her right calf.  Still a little bit of residual pain.  She will continue working on her home exercises and see me in 6 months.  She'll continue the gabapentin at 1200 mg twice a day.      Oksana was seen today for back pain.    Diagnoses and all orders for this visit:    DDD (degenerative disc disease), lumbar    Chronic bilateral low back pain with right-sided sciatica    History of spinal surgery      Return in about 6 months (around 11/21/2018).

## 2018-05-21 ENCOUNTER — OFFICE VISIT (OUTPATIENT)
Dept: NEUROSURGERY | Facility: CLINIC | Age: 43
End: 2018-05-21

## 2018-05-21 VITALS
BODY MASS INDEX: 31.52 KG/M2 | DIASTOLIC BLOOD PRESSURE: 75 MMHG | HEIGHT: 68 IN | SYSTOLIC BLOOD PRESSURE: 106 MMHG | HEART RATE: 71 BPM | WEIGHT: 208 LBS

## 2018-05-21 DIAGNOSIS — G89.29 CHRONIC BILATERAL LOW BACK PAIN WITH RIGHT-SIDED SCIATICA: ICD-10-CM

## 2018-05-21 DIAGNOSIS — M51.36 DDD (DEGENERATIVE DISC DISEASE), LUMBAR: Primary | ICD-10-CM

## 2018-05-21 DIAGNOSIS — Z98.890 HISTORY OF SPINAL SURGERY: ICD-10-CM

## 2018-05-21 DIAGNOSIS — M54.41 CHRONIC BILATERAL LOW BACK PAIN WITH RIGHT-SIDED SCIATICA: ICD-10-CM

## 2018-05-21 PROCEDURE — 99213 OFFICE O/P EST LOW 20 MIN: CPT | Performed by: NEUROLOGICAL SURGERY

## 2018-05-21 RX ORDER — IBUPROFEN 800 MG/1
800 TABLET ORAL EVERY 8 HOURS PRN
Qty: 90 TABLET | Refills: 3 | Status: SHIPPED | OUTPATIENT
Start: 2018-05-21 | End: 2018-08-21 | Stop reason: SDUPTHER

## 2018-06-01 ENCOUNTER — RESULTS ENCOUNTER (OUTPATIENT)
Dept: INTERNAL MEDICINE | Facility: CLINIC | Age: 43
End: 2018-06-01

## 2018-06-01 DIAGNOSIS — Z13.29 SCREENING FOR HYPOTHYROIDISM: ICD-10-CM

## 2018-06-01 DIAGNOSIS — Z13.1 SCREENING FOR DIABETES MELLITUS: ICD-10-CM

## 2018-06-01 DIAGNOSIS — R29.818 SUSPECTED SLEEP APNEA: ICD-10-CM

## 2018-06-01 DIAGNOSIS — Z13.220 SCREENING FOR HYPERLIPIDEMIA: ICD-10-CM

## 2018-06-21 RX ORDER — GABAPENTIN 600 MG/1
TABLET ORAL
Qty: 120 TABLET | Refills: 2 | Status: SHIPPED | OUTPATIENT
Start: 2018-06-21 | End: 2018-10-11 | Stop reason: SDUPTHER

## 2018-08-16 LAB
ALBUMIN SERPL-MCNC: 4.2 G/DL (ref 3.5–5.2)
ALBUMIN/GLOB SERPL: 1.8 G/DL
ALP SERPL-CCNC: 94 U/L (ref 40–129)
ALT SERPL-CCNC: 16 U/L (ref 5–33)
APPEARANCE UR: CLEAR
AST SERPL-CCNC: 18 U/L (ref 5–32)
BACTERIA #/AREA URNS HPF: NORMAL /HPF
BASOPHILS # BLD AUTO: 0.02 10*3/MM3 (ref 0–0.2)
BASOPHILS NFR BLD AUTO: 0.3 % (ref 0–2)
BILIRUB SERPL-MCNC: 0.3 MG/DL (ref 0.2–1.2)
BILIRUB UR QL STRIP: NEGATIVE
BUN SERPL-MCNC: 13 MG/DL (ref 6–20)
BUN/CREAT SERPL: 14.3 (ref 7–25)
CALCIUM SERPL-MCNC: 9.5 MG/DL (ref 8.6–10.5)
CHLORIDE SERPL-SCNC: 102 MMOL/L (ref 98–107)
CHOLEST SERPL-MCNC: 173 MG/DL (ref 0–200)
CO2 SERPL-SCNC: 29.7 MMOL/L (ref 22–29)
COLOR UR: YELLOW
CREAT SERPL-MCNC: 0.91 MG/DL (ref 0.57–1)
EOSINOPHIL # BLD AUTO: 0.1 10*3/MM3 (ref 0.1–0.3)
EOSINOPHIL NFR BLD AUTO: 1.5 % (ref 0–4)
EPI CELLS #/AREA URNS HPF: NORMAL /HPF
ERYTHROCYTE [DISTWIDTH] IN BLOOD BY AUTOMATED COUNT: 12 % (ref 11.5–14.5)
GLOBULIN SER CALC-MCNC: 2.4 GM/DL
GLUCOSE SERPL-MCNC: 86 MG/DL (ref 65–99)
GLUCOSE UR QL: NEGATIVE
HCT VFR BLD AUTO: 38.2 % (ref 37–47)
HDLC SERPL-MCNC: 47 MG/DL (ref 40–60)
HGB BLD-MCNC: 12.9 G/DL (ref 12–16)
HGB UR QL STRIP: NEGATIVE
IMM GRANULOCYTES # BLD: 0.01 10*3/MM3 (ref 0–0.03)
IMM GRANULOCYTES NFR BLD: 0.2 % (ref 0–0.5)
KETONES UR QL STRIP: NEGATIVE
LDLC SERPL CALC-MCNC: 105 MG/DL (ref 0–100)
LDLC/HDLC SERPL: 2.24 {RATIO}
LEUKOCYTE ESTERASE UR QL STRIP: NEGATIVE
LYMPHOCYTES # BLD AUTO: 2.31 10*3/MM3 (ref 0.6–4.8)
LYMPHOCYTES NFR BLD AUTO: 35.5 % (ref 20–45)
MCH RBC QN AUTO: 31.9 PG (ref 27–31)
MCHC RBC AUTO-ENTMCNC: 33.8 G/DL (ref 31–37)
MCV RBC AUTO: 94.6 FL (ref 81–99)
MICRO URNS: NORMAL
MICRO URNS: NORMAL
MONOCYTES # BLD AUTO: 0.5 10*3/MM3 (ref 0–1)
MONOCYTES NFR BLD AUTO: 7.7 % (ref 3–8)
MUCOUS THREADS URNS QL MICRO: PRESENT /HPF
NEUTROPHILS # BLD AUTO: 3.57 10*3/MM3 (ref 1.5–8.3)
NEUTROPHILS NFR BLD AUTO: 54.8 % (ref 45–70)
NITRITE UR QL STRIP: NEGATIVE
NRBC BLD AUTO-RTO: 0 /100 WBC (ref 0–0)
PH UR STRIP: 5 [PH] (ref 5–7.5)
PLATELET # BLD AUTO: 179 10*3/MM3 (ref 140–500)
POTASSIUM SERPL-SCNC: 4.5 MMOL/L (ref 3.5–5.2)
PROT SERPL-MCNC: 6.6 G/DL (ref 6–8.5)
PROT UR QL STRIP: NEGATIVE
RBC # BLD AUTO: 4.04 10*6/MM3 (ref 4.2–5.4)
RBC #/AREA URNS HPF: NORMAL /HPF
SODIUM SERPL-SCNC: 140 MMOL/L (ref 136–145)
SP GR UR: 1.02 (ref 1–1.03)
T4 FREE SERPL-MCNC: 0.6 NG/DL (ref 0.93–1.7)
TRIGL SERPL-MCNC: 104 MG/DL (ref 0–150)
TSH SERPL DL<=0.005 MIU/L-ACNC: 2.35 MIU/ML (ref 0.27–4.2)
URINALYSIS REFLEX: NORMAL
UROBILINOGEN UR STRIP-MCNC: 0.2 MG/DL (ref 0.2–1)
VLDLC SERPL CALC-MCNC: 20.8 MG/DL (ref 7–27)
WBC # BLD AUTO: 6.51 10*3/MM3 (ref 4.8–10.8)
WBC #/AREA URNS HPF: NORMAL /HPF

## 2018-08-21 ENCOUNTER — OFFICE VISIT (OUTPATIENT)
Dept: INTERNAL MEDICINE | Facility: CLINIC | Age: 43
End: 2018-08-21

## 2018-08-21 VITALS
SYSTOLIC BLOOD PRESSURE: 104 MMHG | HEART RATE: 63 BPM | BODY MASS INDEX: 33.59 KG/M2 | DIASTOLIC BLOOD PRESSURE: 70 MMHG | HEIGHT: 67 IN | OXYGEN SATURATION: 99 % | WEIGHT: 214 LBS | RESPIRATION RATE: 18 BRPM

## 2018-08-21 DIAGNOSIS — F32.A ANXIETY AND DEPRESSION: ICD-10-CM

## 2018-08-21 DIAGNOSIS — Z00.00 ANNUAL PHYSICAL EXAM: Primary | ICD-10-CM

## 2018-08-21 DIAGNOSIS — E78.00 PURE HYPERCHOLESTEROLEMIA: ICD-10-CM

## 2018-08-21 DIAGNOSIS — Z79.1 NSAID LONG-TERM USE: ICD-10-CM

## 2018-08-21 DIAGNOSIS — R29.818 SUSPECTED SLEEP APNEA: ICD-10-CM

## 2018-08-21 DIAGNOSIS — F41.9 ANXIETY AND DEPRESSION: ICD-10-CM

## 2018-08-21 PROCEDURE — 99396 PREV VISIT EST AGE 40-64: CPT | Performed by: INTERNAL MEDICINE

## 2018-08-21 PROCEDURE — 93000 ELECTROCARDIOGRAM COMPLETE: CPT | Performed by: INTERNAL MEDICINE

## 2018-08-21 PROCEDURE — 99212 OFFICE O/P EST SF 10 MIN: CPT | Performed by: INTERNAL MEDICINE

## 2018-08-21 RX ORDER — IBUPROFEN 800 MG/1
800 TABLET ORAL EVERY 8 HOURS PRN
Qty: 90 TABLET | Refills: 1 | Status: SHIPPED | OUTPATIENT
Start: 2018-08-21 | End: 2019-08-15

## 2018-08-21 NOTE — PROGRESS NOTES
"Patient Name: Oksana Gandhi is a 42 y.o. female presenting for Annual Exam    Patient is here for physical and doing well. She increased her Zoloft the last time that I saw hear and she has done well on her medication. She is feeling well, but still in a \"funk\" sometimes. She is sleeping well, but does have TMJ that has been keeping her awake.     Her back pain is stable and trying to see PT, but there are some financial stressors. Taking Ibuprofen twice per day as needed, but taking on most days.     She saw sleep doctor, but financial issues have kept her from doing this now. She was layed off and her  is the only one working now. She is looking for a job now.     She is trying to eat within reason. She likes sweets (particularly ice cream). She eats fruits and vegetables. Her children are picky and this has affected her diet.     Well Adult Physical   Patient here for a comprehensive physical exam.The patient reports problems - Depression, REJI     Do you take any herbs or supplements that were not prescribed by a doctor? no   Are you taking calcium supplements? no   Are you taking aspirin daily? no     History:  LMP: Spotting occasionally with her Mirena   Last pap date: due in 2018   Abnormal pap? years ago, but none recently   : 3  Para: 3  Mammogram up to date with Dr. Fontaine in 2017    Oksana Pillai 42 y.o. female who presents for an Annual Wellness Visit.  she has a history of   Patient Active Problem List   Diagnosis   • Spontaneous    • Calf muscle weakness   • Intervertebral disc disorder with radiculopathy of lumbosacral region   • Chronic bilateral low back pain with right-sided sciatica   • DDD (degenerative disc disease), lumbar   • Allergic rhinitis   • Anxiety and depression   • Suspected sleep apnea   • History of colonoscopy with polypectomy   • Obesity (BMI 30-39.9)   • TMJ (temporomandibular joint disorder)   • Hypersomnia with sleep apnea   • " Sleep-related bruxism   • PLMD (periodic limb movement disorder)   • History of spinal surgery   • NSAID long-term use       Health Habits:  Dental Exam. up to date  Eye Exam. up to date  Exercise: 3 times/week.  Current exercise activities include: walking dog and running around with kids     Tob use:N/A   Qualifies for lung Ca screening?N/A       The following portions of the patient's history were reviewed and updated as appropriate: allergies, current medications, past family history, past medical history, past social history, past surgical history and problem list.    Review of Systems   Constitutional: Positive for fatigue. Negative for chills and fever.   HENT:        TMJ on the left that affects her sleep    Respiratory: Negative for cough and shortness of breath.    Cardiovascular: Negative for chest pain and palpitations.   Gastrointestinal: Negative for abdominal pain, constipation, diarrhea and vomiting.   Genitourinary: Negative for difficulty urinating and dysuria.   Musculoskeletal: Positive for back pain.   Skin: Negative for rash.   Neurological: Negative for dizziness and headaches.   Psychiatric/Behavioral: Positive for dysphoric mood and sleep disturbance.       ECG 12 Lead  Date/Time: 8/21/2018 12:46 PM  Performed by: ANSHUL CASEY  Authorized by: ANSHUL CASEY   Comparison: not compared with previous ECG   Previous ECG: no previous ECG available  Rhythm: sinus rhythm  Rate: normal  Conduction: conduction normal  ST Segments: ST segments normal  T Waves: T waves normal  QRS axis: normal  Other: no other findings  Clinical impression: normal ECG            Patient has no known allergies.      Current Outpatient Prescriptions:   •  cyclobenzaprine (FLEXERIL) 10 MG tablet, 1/2-1 po TID prn spasm, Disp: 40 tablet, Rfl: 0  •  fexofenadine (ALLEGRA) 60 MG tablet, Take 60 mg by mouth Daily., Disp: , Rfl:   •  gabapentin (NEURONTIN) 600 MG tablet, TAKE TWO TABLETS BY MOUTH EVERY MORNING AND TAKE TWO  "TABLETS BY MOUTH EVERY EVENING, Disp: 120 tablet, Rfl: 2  •  ibuprofen (ADVIL,MOTRIN) 800 MG tablet, Take 1 tablet by mouth Every 8 (Eight) Hours As Needed for Mild Pain ., Disp: 90 tablet, Rfl: 1  •  levonorgestrel (MIRENA) 20 MCG/24HR IUD, 1 each by Intrauterine route 1 (One) Time., Disp: , Rfl:   •  sertraline (ZOLOFT) 100 MG tablet, Take 2 tablets by mouth Daily., Disp: 90 tablet, Rfl: 1    OBJECTIVE    /70   Pulse 63   Resp 18   Ht 170.2 cm (67\")   Wt 97.1 kg (214 lb)   LMP  (Approximate) Comment: IUD  SpO2 99%   BMI 33.52 kg/m²     Physical Exam   Constitutional: She is oriented to person, place, and time. She appears well-developed and well-nourished. No distress.   HENT:   Head: Normocephalic and atraumatic.   Right Ear: Hearing, tympanic membrane, external ear and ear canal normal.   Left Ear: Hearing, tympanic membrane, external ear and ear canal normal.   Nose: Nose normal.   Mouth/Throat: Uvula is midline and mucous membranes are normal. Posterior oropharyngeal edema and posterior oropharyngeal erythema present. No oropharyngeal exudate. Tonsils are 0 on the right. Tonsils are 0 on the left. No tonsillar exudate.   Eyes: Conjunctivae are normal. Right eye exhibits no discharge. Left eye exhibits no discharge. No scleral icterus.   Neck: Neck supple.   Cardiovascular: Normal rate, regular rhythm, normal heart sounds and intact distal pulses.  Exam reveals no gallop and no friction rub.    No murmur heard.  Pulmonary/Chest: Effort normal and breath sounds normal. No respiratory distress. She has no wheezes. She has no rales.   Abdominal: Soft. Bowel sounds are normal. She exhibits no distension and no mass. There is no tenderness. There is no guarding.   Lymphadenopathy:     She has no cervical adenopathy.   Neurological: She is alert and oriented to person, place, and time. She exhibits normal muscle tone. Coordination normal.   Skin: Skin is warm. Capillary refill takes less than 2 seconds. " No rash noted.   Psychiatric: She has a normal mood and affect. Her behavior is normal.   Nursing note and vitals reviewed.      ASSESSMENT AND PLAN  Vaccines are up to date. I want Oksana to begin a progressive daily aerobic exercise program, follow a low fat, low cholesterol diet, attempt to lose weight, reduce salt in diet and cooking, reduce exposure to stress, improve dietary compliance, continue current medications and return for routine annual checkups.     Mild elevation in her LDL.Will repeat in one year and encouraged diet and exercise.      She is going to wait in getting sleep study, but understands risks of waiting and this may help her fatigue.    Avoid daily NSAID use and educated her on risks. Is going to take only as needed. Kidney function stable.     Continue Zoloft at current dose will see back in one year. Mammogram up to date, but needs to get pap smear results from Dr. Fontaine.     Oksana was seen today for annual exam.    Diagnoses and all orders for this visit:    Annual physical exam  -     ECG 12 Lead    Pure hypercholesterolemia    Suspected sleep apnea    Anxiety and depression    NSAID long-term use    Other orders  -     ibuprofen (ADVIL,MOTRIN) 800 MG tablet; Take 1 tablet by mouth Every 8 (Eight) Hours As Needed for Mild Pain .            Return in about 1 year (around 8/21/2019) for Annual physical, Recheck.

## 2018-09-18 ENCOUNTER — APPOINTMENT (OUTPATIENT)
Dept: WOMENS IMAGING | Facility: HOSPITAL | Age: 43
End: 2018-09-18

## 2018-09-18 PROCEDURE — 77067 SCR MAMMO BI INCL CAD: CPT | Performed by: RADIOLOGY

## 2018-10-11 ENCOUNTER — OFFICE VISIT (OUTPATIENT)
Dept: INTERNAL MEDICINE | Facility: CLINIC | Age: 43
End: 2018-10-11

## 2018-10-11 VITALS
DIASTOLIC BLOOD PRESSURE: 66 MMHG | HEART RATE: 67 BPM | BODY MASS INDEX: 32.8 KG/M2 | HEIGHT: 67 IN | OXYGEN SATURATION: 98 % | WEIGHT: 209 LBS | RESPIRATION RATE: 14 BRPM | SYSTOLIC BLOOD PRESSURE: 102 MMHG | TEMPERATURE: 98.1 F

## 2018-10-11 DIAGNOSIS — R29.818 SUSPECTED SLEEP APNEA: ICD-10-CM

## 2018-10-11 DIAGNOSIS — F32.A ANXIETY AND DEPRESSION: ICD-10-CM

## 2018-10-11 DIAGNOSIS — R53.83 FATIGUE, UNSPECIFIED TYPE: Primary | ICD-10-CM

## 2018-10-11 DIAGNOSIS — R41.840 INATTENTION: ICD-10-CM

## 2018-10-11 DIAGNOSIS — F41.9 ANXIETY AND DEPRESSION: ICD-10-CM

## 2018-10-11 PROCEDURE — 99214 OFFICE O/P EST MOD 30 MIN: CPT | Performed by: INTERNAL MEDICINE

## 2018-10-11 RX ORDER — GABAPENTIN 600 MG/1
TABLET ORAL
Qty: 120 TABLET | Refills: 1 | Status: SHIPPED | OUTPATIENT
Start: 2018-10-11 | End: 2018-11-26 | Stop reason: SDUPTHER

## 2018-10-11 RX ORDER — VENLAFAXINE HYDROCHLORIDE 37.5 MG/1
37.5 CAPSULE, EXTENDED RELEASE ORAL DAILY
Qty: 30 CAPSULE | Refills: 1 | Status: SHIPPED | OUTPATIENT
Start: 2018-10-11 | End: 2018-11-14

## 2018-10-11 NOTE — PROGRESS NOTES
"      Oksana Pillai is a 43 y.o. female, who presents with a chief complaint of   Chief Complaint   Patient presents with   • Fatigue     1 x week   • Altered Mental Status   • Nasal Congestion       42 yo F here for issues with fatigue and \"scattered brain\". She possibly has sleep apnea, but is unable to do test due to financial stressors although approved by insurance. She has TMJ on the left and is wearing her new bite guard. She also has periodic leg movements. No headache or seizures. Just \"doesn't feel right\". She has been on Zoloft for many years. Currently on 200mg. Feels \"okay\" on this. No SI/HI. Still not with a job after getting laid off. Does feel that she doesn't have as much purpose as she did before.          The following portions of the patient's history were reviewed and updated as appropriate: allergies, current medications, past family history, past medical history, past social history, past surgical history and problem list.    Allergies: Patient has no known allergies.    Current Outpatient Prescriptions:   •  cyclobenzaprine (FLEXERIL) 10 MG tablet, 1/2-1 po TID prn spasm, Disp: 40 tablet, Rfl: 0  •  fexofenadine (ALLEGRA) 60 MG tablet, Take 60 mg by mouth Daily., Disp: , Rfl:   •  gabapentin (NEURONTIN) 600 MG tablet, TAKE TWO TABLETS BY MOUTH EVERY MORNING AND TAKE TWO TABLETS BY MOUTH EVERY EVENING, Disp: 120 tablet, Rfl: 2  •  ibuprofen (ADVIL,MOTRIN) 800 MG tablet, Take 1 tablet by mouth Every 8 (Eight) Hours As Needed for Mild Pain ., Disp: 90 tablet, Rfl: 1  •  levonorgestrel (MIRENA) 20 MCG/24HR IUD, 1 each by Intrauterine route 1 (One) Time., Disp: , Rfl:   •  venlafaxine XR (EFFEXOR XR) 37.5 MG 24 hr capsule, Take 1 capsule by mouth Daily., Disp: 30 capsule, Rfl: 1  Medications Discontinued During This Encounter   Medication Reason   • sertraline (ZOLOFT) 100 MG tablet *Therapy completed       Review of Systems   Constitutional: Positive for fatigue. Negative for chills.   Skin: " "Negative for rash.   Psychiatric/Behavioral: Positive for confusion, decreased concentration and sleep disturbance. Negative for self-injury and suicidal ideas. The patient is nervous/anxious.              /66 (BP Location: Left arm, Patient Position: Sitting, Cuff Size: Large Adult)   Pulse 67   Temp 98.1 °F (36.7 °C) (Oral)   Resp 14   Ht 170.2 cm (67.01\")   Wt 94.8 kg (209 lb)   SpO2 98%   BMI 32.73 kg/m²       Physical Exam   Constitutional: She is oriented to person, place, and time. She appears well-developed and well-nourished. No distress.   HENT:   Head: Normocephalic and atraumatic.   Right Ear: External ear normal.   Left Ear: External ear normal.   Mouth/Throat: Oropharynx is clear and moist. No oropharyngeal exudate.   Eyes: Conjunctivae are normal. Right eye exhibits no discharge. Left eye exhibits no discharge. No scleral icterus.   Neck: Neck supple.   Cardiovascular: Normal rate, regular rhythm and normal heart sounds.  Exam reveals no gallop and no friction rub.    No murmur heard.  Pulmonary/Chest: Effort normal and breath sounds normal. No respiratory distress. She has no wheezes. She has no rales.   Lymphadenopathy:     She has no cervical adenopathy.   Neurological: She is alert and oriented to person, place, and time.   Skin: Skin is warm. No rash noted.   Psychiatric: Her behavior is normal. Judgment normal. Her mood appears not anxious. Her affect is not inappropriate. Cognition and memory are normal. She exhibits a depressed mood.   Nursing note and vitals reviewed.        Results for orders placed or performed in visit on 06/01/18   Comprehensive Metabolic Panel   Result Value Ref Range    Glucose 86 65 - 99 mg/dL    BUN 13 6 - 20 mg/dL    Creatinine 0.91 0.57 - 1.00 mg/dL    eGFR Non African Am 68 >60 mL/min/1.73    eGFR African Am 83 >60 mL/min/1.73    BUN/Creatinine Ratio 14.3 7.0 - 25.0    Sodium 140 136 - 145 mmol/L    Potassium 4.5 3.5 - 5.2 mmol/L    Chloride 102 98 - " 107 mmol/L    Total CO2 29.7 (H) 22.0 - 29.0 mmol/L    Calcium 9.5 8.6 - 10.5 mg/dL    Total Protein 6.6 6.0 - 8.5 g/dL    Albumin 4.20 3.50 - 5.20 g/dL    Globulin 2.4 gm/dL    A/G Ratio 1.8 g/dL    Total Bilirubin 0.3 0.2 - 1.2 mg/dL    Alkaline Phosphatase 94 40 - 129 U/L    AST (SGOT) 18 5 - 32 U/L    ALT (SGPT) 16 5 - 33 U/L   Lipid Panel With LDL / HDL Ratio   Result Value Ref Range    Total Cholesterol 173 0 - 200 mg/dL    Triglycerides 104 0 - 150 mg/dL    HDL Cholesterol 47 40 - 60 mg/dL    VLDL Cholesterol 20.8 7 - 27 mg/dL    LDL Cholesterol  105 (H) 0 - 100 mg/dL    LDL/HDL Ratio 2.24    Urinalysis With / Culture If Indicated - Urine, Clean Catch   Result Value Ref Range    Specific Gravity, UA 1.018 1.005 - 1.030    pH, UA 5.0 5.0 - 7.5    Color, UA Yellow Yellow    Appearance, UA Clear Clear    Leukocytes, UA Negative Negative    Protein Negative Negative/Trace    Glucose, UA Negative Negative    Ketones Negative Negative    Blood, UA Negative Negative    Bilirubin, UA Negative Negative    Urobilinogen, UA 0.2 0.2 - 1.0 mg/dL    Nitrite, UA Negative Negative    Microscopic Examination Comment     MICROSCOPIC EXAMINATION See below:     Urinalysis Reflex Comment    TSH   Result Value Ref Range    TSH 2.350 0.270 - 4.200 mIU/mL   T4, Free   Result Value Ref Range    Free T4 0.60 (L) 0.93 - 1.70 ng/dL   Microscopic Examination   Result Value Ref Range    WBC, UA 0-5 0 - 5 /hpf    RBC, UA 0-2 0 - 2 /hpf    Epithelial Cells (non renal) 0-10 0 - 10 /hpf    Mucus, UA Present Not Estab. /hpf    Bacteria, UA None seen None seen/Few /hpf   CBC & Differential   Result Value Ref Range    WBC 6.51 4.80 - 10.80 10*3/mm3    RBC 4.04 (L) 4.20 - 5.40 10*6/mm3    Hemoglobin 12.9 12.0 - 16.0 g/dL    Hematocrit 38.2 37.0 - 47.0 %    MCV 94.6 81.0 - 99.0 fL    MCH 31.9 (H) 27.0 - 31.0 pg    MCHC 33.8 31.0 - 37.0 g/dL    RDW 12.0 11.5 - 14.5 %    Platelets 179 140 - 500 10*3/mm3    Neutrophil Rel % 54.8 45.0 - 70.0 %     Lymphocyte Rel % 35.5 20.0 - 45.0 %    Monocyte Rel % 7.7 3.0 - 8.0 %    Eosinophil Rel % 1.5 0.0 - 4.0 %    Basophil Rel % 0.3 0.0 - 2.0 %    Neutrophils Absolute 3.57 1.50 - 8.30 10*3/mm3    Lymphocytes Absolute 2.31 0.60 - 4.80 10*3/mm3    Monocytes Absolute 0.50 0.00 - 1.00 10*3/mm3    Eosinophils Absolute 0.10 0.10 - 0.30 10*3/mm3    Basophils Absolute 0.02 0.00 - 0.20 10*3/mm3    Immature Granulocyte Rel % 0.2 0.0 - 0.5 %    Immature Grans Absolute 0.01 0.00 - 0.03 10*3/mm3    nRBC 0.0 0.0 - 0.0 /100 WBC           Oksana was seen today for fatigue, altered mental status and nasal congestion.    Diagnoses and all orders for this visit:    Fatigue, unspecified type    Inattention    Suspected sleep apnea    Anxiety and depression    Other orders  -     venlafaxine XR (EFFEXOR XR) 37.5 MG 24 hr capsule; Take 1 capsule by mouth Daily.      Change Zoloft to Effexor and will call me in 4 weeks to let me know how she is doing. I reviewed labs from 8/2018 and all are normal. I am going to call sleep lab and see if we can help financially to get this study paid for. I truly think that her REJI is causing a lot of her issues and I don't want to cover up her symptoms with more medications. She agrees.     Spent 50% of 25 minutes in counseling regarding her symptoms of fatigue and depression and treatment         Return if symptoms worsen or fail to improve and will call in 4 weeks.    Sona Orellana MD  10/11/2018

## 2018-11-11 NOTE — TELEPHONE ENCOUNTER
Spoke with Bc Wylie, PT, regarding this patient.  A note was faxed over to our office on 3/16 advising of the patient's weakness.    I had our PA look at the patients chart.  He is scheduled for f/u on 4/5 and should keep that appointment.  Okay to stop PT if it isn't helping.     I called back and spoke to someone who left a message for Mr. Wylie.  
98.4

## 2018-11-13 ENCOUNTER — TELEPHONE (OUTPATIENT)
Dept: INTERNAL MEDICINE | Facility: CLINIC | Age: 43
End: 2018-11-13

## 2018-11-13 NOTE — TELEPHONE ENCOUNTER
"Advised front office to call and schedule acute apt for patient.     ----- Message from Apoorva Marroquin MD sent at 11/12/2018  5:18 PM EST -----  Just schedule her for OV    ----- Message -----  From: Magalis Lawler CMA  Sent: 11/12/2018   2:00 PM  To: Apoorva Marroquin MD    Patient has been taking RX Effexor for 4 weeks now. Patient previously on RX Zoloft. Patient states she has noticed that her focus is better but \"slower on some days.\" Patient states increased exhaustion/fatigue, patient understands that as previously dicussed with Dr. Orellana a sleep study is needed but current financial situation will not allow it. Patient unsure if new RX Effexor can cause increased exhaustion/fatigue. Patient explained that she is unsure if \"life stuff\" is influencing these symptoms or if RX Effexor not helping and or side effects - patient explains she is \"more down on life\", \"upset more emotionally\" etc. I advised I would let another physician know due to Dr. Orellana being out. Please advise, thank you!      "

## 2018-11-14 ENCOUNTER — OFFICE VISIT (OUTPATIENT)
Dept: INTERNAL MEDICINE | Facility: CLINIC | Age: 43
End: 2018-11-14

## 2018-11-14 VITALS
HEART RATE: 84 BPM | SYSTOLIC BLOOD PRESSURE: 112 MMHG | WEIGHT: 211 LBS | HEIGHT: 67 IN | OXYGEN SATURATION: 98 % | DIASTOLIC BLOOD PRESSURE: 74 MMHG | BODY MASS INDEX: 33.12 KG/M2

## 2018-11-14 DIAGNOSIS — M51.17 INTERVERTEBRAL DISC DISORDER WITH RADICULOPATHY OF LUMBOSACRAL REGION: ICD-10-CM

## 2018-11-14 DIAGNOSIS — R53.83 FATIGUE, UNSPECIFIED TYPE: ICD-10-CM

## 2018-11-14 DIAGNOSIS — F32.A ANXIETY AND DEPRESSION: Primary | ICD-10-CM

## 2018-11-14 DIAGNOSIS — F41.9 ANXIETY AND DEPRESSION: Primary | ICD-10-CM

## 2018-11-14 PROCEDURE — 99214 OFFICE O/P EST MOD 30 MIN: CPT | Performed by: INTERNAL MEDICINE

## 2018-11-14 RX ORDER — DULOXETIN HYDROCHLORIDE 30 MG/1
30 CAPSULE, DELAYED RELEASE ORAL DAILY
Qty: 30 CAPSULE | Refills: 1 | Status: SHIPPED | OUTPATIENT
Start: 2018-11-14 | End: 2018-12-11 | Stop reason: SDUPTHER

## 2018-11-14 NOTE — PROGRESS NOTES
"      Oksana Pillai is a 43 y.o. female, who presents with a chief complaint of   Chief Complaint   Patient presents with   • Anxiety     Was on Zoloft, then put on Effexor, she feels \"like a delicate flower.\" She feels her anxiety is not better.        HPI   The pt is here for follow up.  She was on zoloft then switched to effexor.  She feels \"like a delicate flower\" emotionally.  She is having lots of fatigue and feels like it has been worse over the past 4 weeks.  She has also had a cold and didn't know if that was part of it.  Ears still sore. No SI/HI.  She isnt used to being down like this.     She continues to have back pain.  She is on gabapentin. She is trying to exercise more but frustrated with her limitations.     The following portions of the patient's history were reviewed and updated as appropriate: allergies, current medications, past family history, past medical history, past social history, past surgical history and problem list.    Allergies: Patient has no known allergies.    Review of Systems   Constitutional: Negative.    HENT: Negative.    Eyes: Negative.    Respiratory: Negative.    Cardiovascular: Negative.    Gastrointestinal: Negative.    Endocrine: Negative.    Genitourinary: Negative.    Musculoskeletal: Negative.    Skin: Negative.    Allergic/Immunologic: Negative.    Neurological: Negative.    Hematological: Negative.    Psychiatric/Behavioral: The patient is nervous/anxious.    All other systems reviewed and are negative.            Wt Readings from Last 3 Encounters:   11/14/18 95.7 kg (211 lb)   10/11/18 94.8 kg (209 lb)   08/21/18 97.1 kg (214 lb)     Temp Readings from Last 3 Encounters:   10/11/18 98.1 °F (36.7 °C) (Oral)   02/19/18 98.7 °F (37.1 °C) (Oral)   06/13/17 98.8 °F (37.1 °C) (Oral)     BP Readings from Last 3 Encounters:   11/14/18 112/74   10/11/18 102/66   08/21/18 104/70     Pulse Readings from Last 3 Encounters:   11/14/18 84   10/11/18 67   08/21/18 63     Body mass " index is 33.05 kg/m².  @LASTSAO2(3)@    Physical Exam   Constitutional: She is oriented to person, place, and time. She appears well-developed and well-nourished. No distress.   HENT:   Head: Normocephalic and atraumatic.   Right Ear: External ear normal.   Left Ear: External ear normal.   Nose: Nose normal.   Mouth/Throat: Oropharynx is clear and moist.   Bilateral serous effusion   Eyes: Conjunctivae and EOM are normal. Pupils are equal, round, and reactive to light.   Neck: Normal range of motion. Neck supple.   Cardiovascular: Normal rate, regular rhythm, normal heart sounds and intact distal pulses.   Pulmonary/Chest: Effort normal and breath sounds normal. No respiratory distress. She has no wheezes.   Musculoskeletal: Normal range of motion.   Normal gait   Neurological: She is alert and oriented to person, place, and time.   Skin: Skin is warm and dry.   Psychiatric: She has a normal mood and affect. Her behavior is normal. Judgment and thought content normal.   Nursing note and vitals reviewed.      Results for orders placed or performed in visit on 06/01/18   Comprehensive Metabolic Panel   Result Value Ref Range    Glucose 86 65 - 99 mg/dL    BUN 13 6 - 20 mg/dL    Creatinine 0.91 0.57 - 1.00 mg/dL    eGFR Non African Am 68 >60 mL/min/1.73    eGFR African Am 83 >60 mL/min/1.73    BUN/Creatinine Ratio 14.3 7.0 - 25.0    Sodium 140 136 - 145 mmol/L    Potassium 4.5 3.5 - 5.2 mmol/L    Chloride 102 98 - 107 mmol/L    Total CO2 29.7 (H) 22.0 - 29.0 mmol/L    Calcium 9.5 8.6 - 10.5 mg/dL    Total Protein 6.6 6.0 - 8.5 g/dL    Albumin 4.20 3.50 - 5.20 g/dL    Globulin 2.4 gm/dL    A/G Ratio 1.8 g/dL    Total Bilirubin 0.3 0.2 - 1.2 mg/dL    Alkaline Phosphatase 94 40 - 129 U/L    AST (SGOT) 18 5 - 32 U/L    ALT (SGPT) 16 5 - 33 U/L   Lipid Panel With LDL / HDL Ratio   Result Value Ref Range    Total Cholesterol 173 0 - 200 mg/dL    Triglycerides 104 0 - 150 mg/dL    HDL Cholesterol 47 40 - 60 mg/dL    VLDL  Cholesterol 20.8 7 - 27 mg/dL    LDL Cholesterol  105 (H) 0 - 100 mg/dL    LDL/HDL Ratio 2.24    Urinalysis With / Culture If Indicated - Urine, Clean Catch   Result Value Ref Range    Specific Gravity, UA 1.018 1.005 - 1.030    pH, UA 5.0 5.0 - 7.5    Color, UA Yellow Yellow    Appearance, UA Clear Clear    Leukocytes, UA Negative Negative    Protein Negative Negative/Trace    Glucose, UA Negative Negative    Ketones Negative Negative    Blood, UA Negative Negative    Bilirubin, UA Negative Negative    Urobilinogen, UA 0.2 0.2 - 1.0 mg/dL    Nitrite, UA Negative Negative    Microscopic Examination Comment     MICROSCOPIC EXAMINATION See below:     Urinalysis Reflex Comment    TSH   Result Value Ref Range    TSH 2.350 0.270 - 4.200 mIU/mL   T4, Free   Result Value Ref Range    Free T4 0.60 (L) 0.93 - 1.70 ng/dL   Microscopic Examination   Result Value Ref Range    WBC, UA 0-5 0 - 5 /hpf    RBC, UA 0-2 0 - 2 /hpf    Epithelial Cells (non renal) 0-10 0 - 10 /hpf    Mucus, UA Present Not Estab. /hpf    Bacteria, UA None seen None seen/Few /hpf   CBC & Differential   Result Value Ref Range    WBC 6.51 4.80 - 10.80 10*3/mm3    RBC 4.04 (L) 4.20 - 5.40 10*6/mm3    Hemoglobin 12.9 12.0 - 16.0 g/dL    Hematocrit 38.2 37.0 - 47.0 %    MCV 94.6 81.0 - 99.0 fL    MCH 31.9 (H) 27.0 - 31.0 pg    MCHC 33.8 31.0 - 37.0 g/dL    RDW 12.0 11.5 - 14.5 %    Platelets 179 140 - 500 10*3/mm3    Neutrophil Rel % 54.8 45.0 - 70.0 %    Lymphocyte Rel % 35.5 20.0 - 45.0 %    Monocyte Rel % 7.7 3.0 - 8.0 %    Eosinophil Rel % 1.5 0.0 - 4.0 %    Basophil Rel % 0.3 0.0 - 2.0 %    Neutrophils Absolute 3.57 1.50 - 8.30 10*3/mm3    Lymphocytes Absolute 2.31 0.60 - 4.80 10*3/mm3    Monocytes Absolute 0.50 0.00 - 1.00 10*3/mm3    Eosinophils Absolute 0.10 0.10 - 0.30 10*3/mm3    Basophils Absolute 0.02 0.00 - 0.20 10*3/mm3    Immature Granulocyte Rel % 0.2 0.0 - 0.5 %    Immature Grans Absolute 0.01 0.00 - 0.03 10*3/mm3    nRBC 0.0 0.0 - 0.0 /100  MAYURI Gandhi was seen today for anxiety.    Diagnoses and all orders for this visit:    Anxiety and depression  -     DULoxetine (CYMBALTA) 30 MG capsule; Take 1 capsule by mouth Daily.    Fatigue, unspecified type    Intervertebral disc disorder with radiculopathy of lumbosacral region  -     DULoxetine (CYMBALTA) 30 MG capsule; Take 1 capsule by mouth Daily.      Restart flonase for serous otitis.      Outpatient Medications Prior to Visit   Medication Sig Dispense Refill   • cyclobenzaprine (FLEXERIL) 10 MG tablet 1/2-1 po TID prn spasm 40 tablet 0   • fexofenadine (ALLEGRA) 60 MG tablet Take 60 mg by mouth Daily.     • gabapentin (NEURONTIN) 600 MG tablet TAKE TWO TABLETS BY MOUTH EVERY MORNING AND TAKE TWO TABLETS BY MOUTH EVERY EVENING 120 tablet 1   • ibuprofen (ADVIL,MOTRIN) 800 MG tablet Take 1 tablet by mouth Every 8 (Eight) Hours As Needed for Mild Pain . 90 tablet 1   • levonorgestrel (MIRENA) 20 MCG/24HR IUD 1 each by Intrauterine route 1 (One) Time.     • venlafaxine XR (EFFEXOR XR) 37.5 MG 24 hr capsule Take 1 capsule by mouth Daily. 30 capsule 1     No facility-administered medications prior to visit.      New Medications Ordered This Visit   Medications   • DULoxetine (CYMBALTA) 30 MG capsule     Sig: Take 1 capsule by mouth Daily.     Dispense:  30 capsule     Refill:  1     [unfilled]  Medications Discontinued During This Encounter   Medication Reason   • venlafaxine XR (EFFEXOR XR) 37.5 MG 24 hr capsule          Return in about 4 weeks (around 12/12/2018) for Recheck.

## 2018-11-21 NOTE — PROGRESS NOTES
Subjective   Patient ID: Oksana Pillai is a 43 y.o. female is here today for 6 month follow up. Patient was last seen 5.21.18. Patient has a history of L5-S1 minimally invasive microdiscectomy.  She has residual LBP and right foot pain.  Patient states her LBP is about the same, however, she does have intermittent shooting pain in her lowe back with certain physical activities, she is concerned about that.  She did not go to Pt due to financial issues.  No leg weakness or leg pain.  She does still have numbness right toes. Residual LBP is her main concern. She is taking Gabapentin 1200 mg BID.  She is taking Advil 800mg prn.      Back Pain   The problem occurs intermittently. The problem is unchanged. The pain is present in the lumbar spine. The pain is at a severity of 8/10. The pain is moderate. The symptoms are aggravated by sitting and twisting. Associated symptoms include numbness.       The following portions of the patient's history were reviewed and updated as appropriate: allergies, current medications, past family history, past medical history, past social history, past surgical history and problem list.    Review of Systems   Musculoskeletal: Positive for back pain.   Neurological: Positive for numbness.   All other systems reviewed and are negative.    This patient has had a right L5-S1 microdiskectomy about a year and a half ago. She returns for a followup visit. She remains on gabapentin 1200 mg b.i.d. The strength has all come back into the calf and by and large the sciatica has settled down. We talked about perhaps weaning the medication. I told her that she can try and wean it gradually to 600 b.i.d. but if the pain in the leg recurs, to go back on the higher dose. The thing that is bothering her most is mechanical low back pain, worse a bit when she is leaning backwards. This is probably due to her facet disease. She says it is interfering with her quality of life. Will send her to Dr. Danielson for  medial branch blocks and a possible radiofrequency ablation. I have been giving her her gabapentin and will refill it today.      Objective   Physical Exam   Constitutional: She is oriented to person, place, and time. She appears well-developed and well-nourished.   HENT:   Head: Normocephalic and atraumatic.   Eyes: Conjunctivae and EOM are normal. Pupils are equal, round, and reactive to light.   Fundoscopic exam:       The right eye shows no papilledema. The right eye shows venous pulsations.        The left eye shows no papilledema. The left eye shows venous pulsations.   Neck: Carotid bruit is not present.   Neurological: She is oriented to person, place, and time. She has a normal Finger-Nose-Finger Test and a normal Heel to Shin Test. Gait normal.   Reflex Scores:       Tricep reflexes are 2+ on the right side and 2+ on the left side.       Bicep reflexes are 2+ on the right side and 2+ on the left side.       Brachioradialis reflexes are 2+ on the right side and 2+ on the left side.       Patellar reflexes are 2+ on the right side and 2+ on the left side.       Achilles reflexes are 2+ on the right side and 2+ on the left side.  Psychiatric: Her speech is normal.     Neurologic Exam     Mental Status   Oriented to person, place, and time.   Registration of memory: Good recent and remote memory.   Attention: normal. Concentration: normal.   Speech: speech is normal   Level of consciousness: alert  Knowledge: consistent with education.     Cranial Nerves     CN II   Visual fields full to confrontation.   Visual acuity: normal    CN III, IV, VI   Pupils are equal, round, and reactive to light.  Extraocular motions are normal.     CN V   Facial sensation intact.   Right corneal reflex: normal  Left corneal reflex: normal    CN VII   Facial expression full, symmetric.   Right facial weakness: none  Left facial weakness: none    CN VIII   Hearing: intact    CN IX, X   Palate: symmetric    CN XI   Right  sternocleidomastoid strength: normal  Left sternocleidomastoid strength: normal    CN XII   Tongue: not atrophic  Tongue deviation: none    Motor Exam   Muscle bulk: normal  Right arm tone: normal  Left arm tone: normal  Right leg tone: normal  Left leg tone: normal    Strength   Strength 5/5 except as noted.     Sensory Exam   Light touch normal.     Gait, Coordination, and Reflexes     Gait  Gait: normal    Coordination   Finger to nose coordination: normal  Heel to shin coordination: normal    Reflexes   Right brachioradialis: 2+  Left brachioradialis: 2+  Right biceps: 2+  Left biceps: 2+  Right triceps: 2+  Left triceps: 2+  Right patellar: 2+  Left patellar: 2+  Right achilles: 2+  Left achilles: 2+  Right : 2+  Left : 2+      Assessment/Plan   Independent Review of Radiographic Studies:    I reviewed the postoperative MRI done in July 2017 which showed no evidence of recurrent herniated disc on the right at L5-S1.  There was still a lot of facet disease in the lumbar region as well.  Agree with the report.      Medical Decision Making:    We'll go ahead and refill the gabapentin at the current dose but she can wean it if she wishes described above.  We'll send her to Dr. Danielson for series of medial branch blocks and possible radiofrequency ablation and have her come back in 7 months.      Oksana was seen today for back pain and numbness.    Diagnoses and all orders for this visit:    DDD (degenerative disc disease), lumbar  -     Ambulatory Referral to Pain Management    Chronic bilateral low back pain with right-sided sciatica  -     Ambulatory Referral to Pain Management    History of spinal surgery    Other orders  -     gabapentin (NEURONTIN) 600 MG tablet; Take 2 tablets PO BID      Return in about 7 months (around 6/26/2019).

## 2018-11-26 ENCOUNTER — OFFICE VISIT (OUTPATIENT)
Dept: NEUROSURGERY | Facility: CLINIC | Age: 43
End: 2018-11-26

## 2018-11-26 VITALS
HEIGHT: 67 IN | HEART RATE: 72 BPM | DIASTOLIC BLOOD PRESSURE: 74 MMHG | BODY MASS INDEX: 34.06 KG/M2 | WEIGHT: 217 LBS | SYSTOLIC BLOOD PRESSURE: 120 MMHG

## 2018-11-26 DIAGNOSIS — M51.36 DDD (DEGENERATIVE DISC DISEASE), LUMBAR: Primary | ICD-10-CM

## 2018-11-26 DIAGNOSIS — M54.41 CHRONIC BILATERAL LOW BACK PAIN WITH RIGHT-SIDED SCIATICA: ICD-10-CM

## 2018-11-26 DIAGNOSIS — Z98.890 HISTORY OF SPINAL SURGERY: ICD-10-CM

## 2018-11-26 DIAGNOSIS — G89.29 CHRONIC BILATERAL LOW BACK PAIN WITH RIGHT-SIDED SCIATICA: ICD-10-CM

## 2018-11-26 PROCEDURE — 99213 OFFICE O/P EST LOW 20 MIN: CPT | Performed by: NEUROLOGICAL SURGERY

## 2018-11-26 RX ORDER — GABAPENTIN 600 MG/1
TABLET ORAL
Qty: 120 TABLET | Refills: 5 | Status: SHIPPED | OUTPATIENT
Start: 2018-11-26 | End: 2018-12-10

## 2018-12-10 ENCOUNTER — OFFICE VISIT (OUTPATIENT)
Dept: PAIN MEDICINE | Facility: CLINIC | Age: 43
End: 2018-12-10

## 2018-12-10 VITALS
BODY MASS INDEX: 34.18 KG/M2 | OXYGEN SATURATION: 97 % | HEART RATE: 85 BPM | WEIGHT: 217.8 LBS | RESPIRATION RATE: 18 BRPM | TEMPERATURE: 98.7 F | HEIGHT: 67 IN | SYSTOLIC BLOOD PRESSURE: 113 MMHG | DIASTOLIC BLOOD PRESSURE: 80 MMHG

## 2018-12-10 DIAGNOSIS — M43.06 LUMBAR SPONDYLOLYSIS: ICD-10-CM

## 2018-12-10 DIAGNOSIS — G89.29 CHRONIC BILATERAL LOW BACK PAIN WITH RIGHT-SIDED SCIATICA: Primary | ICD-10-CM

## 2018-12-10 DIAGNOSIS — M51.36 DDD (DEGENERATIVE DISC DISEASE), LUMBAR: ICD-10-CM

## 2018-12-10 DIAGNOSIS — M54.41 CHRONIC BILATERAL LOW BACK PAIN WITH RIGHT-SIDED SCIATICA: Primary | ICD-10-CM

## 2018-12-10 LAB
POC AMPHETAMINES: NEGATIVE
POC BARBITURATES: NEGATIVE
POC BENZODIAZEPHINES: NEGATIVE
POC COCAINE: NEGATIVE
POC METHADONE: NEGATIVE
POC METHAMPHETAMINE SCREEN URINE: NEGATIVE
POC OPIATES: NEGATIVE
POC OXYCODONE: NEGATIVE
POC PHENCYCLIDINE: NEGATIVE
POC PROPOXYPHENE: NEGATIVE
POC THC: NEGATIVE
POC TRICYCLIC ANTIDEPRESSANTS: NEGATIVE

## 2018-12-10 PROCEDURE — 80305 DRUG TEST PRSMV DIR OPT OBS: CPT | Performed by: PAIN MEDICINE

## 2018-12-10 PROCEDURE — 99204 OFFICE O/P NEW MOD 45 MIN: CPT | Performed by: PAIN MEDICINE

## 2018-12-10 RX ORDER — VENLAFAXINE HYDROCHLORIDE 37.5 MG/1
CAPSULE, EXTENDED RELEASE ORAL
Qty: 90 CAPSULE | Refills: 1 | Status: SHIPPED | OUTPATIENT
Start: 2018-12-10 | End: 2018-12-10

## 2018-12-10 RX ORDER — GABAPENTIN 600 MG/1
TABLET ORAL
Qty: 120 TABLET | Refills: 0 | Status: SHIPPED | OUTPATIENT
Start: 2018-12-10 | End: 2019-01-06 | Stop reason: SDUPTHER

## 2018-12-10 NOTE — PATIENT INSTRUCTIONS
Facet Joint Block  The facet joints connect the bones of the spine (vertebrae). They make it possible for you to bend, twist, and make other movements with your spine. They also keep you from bending too far, twisting too far, and making other excessive movements.  A facet joint block is a procedure where a numbing medicine (anesthetic) is injected into a facet joint. Often, a type of anti-inflammatory medicine called a steroid is also injected. A facet joint block may be done to diagnose neck or back pain. If the pain gets better after a facet joint block, it means the pain is probably coming from the facet joint. If the pain does not get better, it means the pain is probably not coming from the facet joint. A facet joint block may also be done to relieve neck or back pain caused by an inflamed facet joint. A facet joint block is only done to relieve pain if the pain does not improve with other methods, such as medicine, exercise programs, and physical therapy.  Tell a health care provider about:  · Any allergies you have.  · All medicines you are taking, including vitamins, herbs, eye drops, creams, and over-the-counter medicines.  · Any problems you or family members have had with anesthetic medicines.  · Any blood disorders you have.  · Any surgeries you have had.  · Any medical conditions you have.  · Whether you are pregnant or may be pregnant.  What are the risks?  Generally, this is a safe procedure. However, problems may occur, including:  · Bleeding.  · Injury to a nerve near the injection site.  · Pain at the injection site.  · Weakness or numbness in areas controlled by nerves near the injection site.  · Infection.  · Temporary fluid retention.  · Allergic reactions to medicines or dyes.  · Injury to other structures or organs near the injection site.    What happens before the procedure?  · Follow instructions from your health care provider about eating or drinking restrictions.  · Ask your health care  provider about:  ? Changing or stopping your regular medicines. This is especially important if you are taking diabetes medicines or blood thinners.  ? Taking medicines such as aspirin and ibuprofen. These medicines can thin your blood. Do not take these medicines before your procedure if your health care provider instructs you not to.  · Do not take any new dietary supplements or medicines without asking your health care provider first.  · Plan to have someone take you home after the procedure.  What happens during the procedure?  · You may need to remove your clothing and dress in an open-back gown.  · The procedure will be done while you are lying on an X-ray table. You will most likely be asked to lie on your stomach, but you may be asked to lie in a different position if an injection will be made in your neck.  · Machines will be used to monitor your oxygen levels, heart rate, and blood pressure.  · If an injection will be made in your neck, an IV tube will be inserted into one of your veins. Fluids and medicine will flow directly into your body through the IV tube.  · The area over the facet joint where the injection will be made will be cleaned with soap. The surrounding skin will be covered with clean drapes.  · A numbing medicine (local anesthetic) will be applied to your skin. Your skin may sting or burn for a moment.  · A video X-ray machine (fluoroscopy) will be used to locate the joint. In some cases, a CT scan may be used.  · A contrast dye may be injected into the facet joint area to help locate the joint.  · When the joint is located, an anesthetic will be injected into the joint through the needle.  · Your health care provider will ask you whether you feel pain relief. If you do feel relief, a steroid may be injected to provide pain relief for a longer period of time. If you do not feel relief or feel only partial relief, additional injections of an anesthetic may be made in other facet  joints.  · The needle will be removed.  · Your skin will be cleaned.  · A bandage (dressing) will be applied over each injection site.  The procedure may vary among health care providers and hospitals.  What happens after the procedure?  · You will be observed for 15-30 minutes before being allowed to go home.  This information is not intended to replace advice given to you by your health care provider. Make sure you discuss any questions you have with your health care provider.  Document Released: 05/08/2008 Document Revised: 01/18/2017 Document Reviewed: 09/12/2016  [employer] Interactive Patient Education © 2018 [employer]    Radiofrequency Lesioning  Radiofrequency lesioning is a procedure that is performed to relieve pain. The procedure is often used for back, neck, or arm pain. Radiofrequency lesioning involves the use of a machine that creates radio waves to make heat. During the procedure, the heat is applied to the nerve that carries the pain signal. The heat damages the nerve and interferes with the pain signal. Pain relief usually starts about 2 weeks after the procedure and lasts for 6 months to 1 year.  Tell a health care provider about:  · Any allergies you have.  · All medicines you are taking, including vitamins, herbs, eye drops, creams, and over-the-counter medicines.  · Any problems you or family members have had with anesthetic medicines.  · Any blood disorders you have.  · Any surgeries you have had.  · Any medical conditions you have.  · Whether you are pregnant or may be pregnant.  What are the risks?  Generally, this is a safe procedure. However, problems may occur, including:  · Pain or soreness at the injection site.  · Infection at the injection site.  · Damage to nerves or blood vessels.    What happens before the procedure?  · Ask your health care provider about:  ? Changing or stopping your regular medicines. This is especially important if you are taking diabetes medicines or blood  thinners.  ? Taking medicines such as aspirin and ibuprofen. These medicines can thin your blood. Do not take these medicines before your procedure if your health care provider instructs you not to.  · Follow instructions from your health care provider about eating or drinking restrictions.  · Plan to have someone take you home after the procedure.  · If you go home right after the procedure, plan to have someone with you for 24 hours.  What happens during the procedure?  · You will be given one or more of the following:  ? A medicine to help you relax (sedative).  ? A medicine to numb the area (local anesthetic).  · You will be awake during the procedure. You will need to be able to talk with the health care provider during the procedure.  · With the help of a type of X-ray (fluoroscopy), the health care provider will insert a radiofrequency needle into the area to be treated.  · Next, a wire that carries the radio waves (electrode) will be put through the radiofrequency needle. An electrical pulse will be sent through the electrode to verify the correct nerve. You will feel a tingling sensation, and you may have muscle twitching.  · Then, the tissue that is around the needle tip will be heated by an electric current that is passed using the radiofrequency machine. This will numb the nerves.  · A bandage (dressing) will be put on the insertion area after the procedure is done.  The procedure may vary among health care providers and hospitals.  What happens after the procedure?  · Your blood pressure, heart rate, breathing rate, and blood oxygen level will be monitored often until the medicines you were given have worn off.  · Return to your normal activities as directed by your health care provider.  This information is not intended to replace advice given to you by your health care provider. Make sure you discuss any questions you have with your health care provider.  Document Released: 08/15/2012 Document  "Revised: 05/25/2017 Document Reviewed: 01/25/2016  [employer] Interactive Patient Education © 2018 [employer]    -------  Education about Medial Branch Blockade and RF Therapy:    This medial branch blockade (MBB) suggested is intended for diagnostic purposes, with the intent of offering the patient Radiofrequency thermal rhizotomy (RF) if the MBB is diagnostically effective.  The diagnostic blockade is necessary to determine the likelihood that RF therapy could be efficacious in providing long term relief to the patient.    Medial branches are sensory nerve branches that connect to a facet joint and transmit sensations & pain signals from that joint.  Facet is a term for the type of joints found in the spine.  Medial branches are the nerves that go to a facet, and therefore are also sometimes called \"facet joint nerves\" (FJNs).      In a medial branch blockade procedure, xray fluoroscopy is used to verify the locations of the outside of the joint lines which are being targeted.  Under xray guidance, needles are placed to these areas.  Contrast dye is injected to confirm proper placement, with dye flowing over the joint area, and to ensure that the dye does not flow into unintended areas such as a vein.  When this is confirmed, local anesthetic is injected to block the medial branch at that joint level.      If MBBs are diagnostically successful in blocking pain, then the patient is most likely a great candidate for Radiofrequency of those facet joint nerves.  In the RF procedure, needles are placed to the joint lines in the same fashion, and after testing, the needle tips are heated to thermally treat the nerves, blocking the nerves by in essence damaging the nerves with the heat treatment.       Medically, a successful RF procedure should provide a patient with 50% pain relief or more for at least 6 months.  Clinical experience suggests that successful patients receive relief more in the range of 12 months on " average.  We also discussed that a fortunate minority of patients receive therapeutic success from the MBB, and may not require RF ablation.  If a patient receives more than 8 weeks of relief from MBB, then occasional repeat MBB for therapeutic purposes is a very reasonable alternative therapy.  This course of therapy is consistent with our LCDs according to our CMS  in the area, and therefore other insurance providers should follow accordingly.  We will monitor our patients to screen for these therapeutic responders and will offer RF therapy only when necessary.        We discussed that MBB & RF are not without risks.  Guidelines regarding anticoagulant use & neuraxial procedures will be respected.  Patients that are ill or otherwise may be at risk for sepsis will not have their spines accessed by neuraxial injections of any type.  This patient will not be offered these therapies if there is an increased risk.   We discussed that there is a risk of postprocedural pain and also a risk of worsening of clinical picture with these procedures as with any neuraxial procedure.    -------

## 2018-12-10 NOTE — PROGRESS NOTES
CHIEF COMPLAINT: Back Pain    Oksana Pillai is a 43 y.o. female.   He was referred here by Brian Ojeda MD. He presents to the office for evaluation and treatment of Back Pain    HPI  Back Pain  Started in May 2016. Ms. Pillai states that she has seen neurosurgeon Dr. Ojeda for her back pain. She had back surgery 4/21/17 by Dr. Ojeda and states her radiating pain has improved but continues to have low back pain.   Started on gabapentin, up to 1200 mg bid. Given RLE radiation is almost completley resolves just with some residual RLE sensitivity and right 5th toe numbness, trying to decrease to see if pain worsens.     The patient states their pain is a 4 on a scale of 1-10.  The patient describes this pain as constant dull, ache, shooting, stabbing and burning.  The pain is located in bilateral low back and does not radiate. This painful problem is aggravated by lifting, standing, sitting and walking and is alleviated by pain medication, lying down and heat/heating pad. Pain is worse with cold weather, weather changes, worse in am. Improved with heat and NSAIDS.     Current pain medications:   Ibuprofen daily - helpful  Gabapentin 1200 mg bid    Past therapies:  Physical Therapy: yes ( didn't help) dry needling - no help  Chiropractor: no  Massage Therapy: yes  TENS: no  Neck or back surgery: yes (right L5, S1 microdiskectomy- April 2017 be Dr. MENDES)  Past pain management: no  Dry Needling- didn't help    Previous Injections: none    PEG Assessment   What number best describes your pain on average in the past week? 6  What number best describes how, during the past week, pain has interfered with your enjoyment of life? 8  What number best describes how, during the past week, pain has interfered with your general activity? 8      Current Outpatient Medications:   •  DULoxetine (CYMBALTA) 30 MG capsule, Take 1 capsule by mouth Daily., Disp: 30 capsule, Rfl: 1  •  fexofenadine (ALLEGRA) 60 MG tablet, Take 60 mg  by mouth Daily., Disp: , Rfl:   •  gabapentin (NEURONTIN) 600 MG tablet, TAKE TWO TABLETS BY MOUTH EVERY MORNING AND TAKE TWO TABLETS BY MOUTH EVERY EVENING, Disp: 120 tablet, Rfl: 0  •  ibuprofen (ADVIL,MOTRIN) 800 MG tablet, Take 1 tablet by mouth Every 8 (Eight) Hours As Needed for Mild Pain ., Disp: 90 tablet, Rfl: 1  •  levonorgestrel (MIRENA) 20 MCG/24HR IUD, 1 each by Intrauterine route 1 (One) Time., Disp: , Rfl:   •  cyclobenzaprine (FLEXERIL) 10 MG tablet, 1/2-1 po TID prn spasm, Disp: 40 tablet, Rfl: 0    REVIEW OF PERTINENT MEDICAL DATA  Chart reviewed and summarization of all medical records up to new patient visit performed.  Surgery notes reviewed. Continued low back pain. Likely arthritic in origin. No surgical options.     IMAGING  MRI LUMBAR SPINE WITH AND WITHOUT CONTRAST - 7/2017:  FINDINGS: There is moderate disc desiccation with mild to moderate loss  of disc height at L5-S1. Endplate degenerative changes are noted at  L5-S1, more prominent to the right with endplate degenerative changes  being slightly more prominent as compared to the prior examination.   L1-L2: There is no evidence of disc bulge or herniation.   L2-L3: There is no evidence of disc bulge or herniation.   L3-L4: Mild facet degenerative disease is present bilaterally. There is  no evidence of disc bulge or herniation.   L4-L5: Mild facet degenerative disease is present bilaterally.  L5-S1: A right laminotomy is noted. There is enhancing tissue anterior  lateral, lateral and to a lesser extent posterior lateral to the thecal  sac to the right consistent with granulation tissue. This abuts the  right S1 nerve root. There is no evidence of a disc herniation or disc  fragment.   IMPRESSION:  The patient is status post discectomy at L5-S1 on the right.  There is enhancing tissue in the epidural space anterolateral and  lateral to the thecal sac at L5-S1 abutting the right S1 nerve root.  There is no evidence of disc herniation or  "disc fragment.    LUMBAR SPINE PLAIN FILMS SERIES COMPLETE WITH ADDITIONAL FLEXION AND EXTENSION VIEWS OF THE LUMBAR SPINE 07/12/2017  FINDINGS: A total of 7 views of the lumbar spine including AP, lateral,  bilateral oblique and lateral flexion and extension views of the entire  lumbar spine and coned-down lateral view of the lumbosacral junction are  submitted for interpretation. There is mild disc space narrowing at  L5-S1. The remainder of the lumbar vertebral body heights and disc  spaces are well-maintained. There is mild bilateral facet overgrowth  from L4 to S1. No abnormal motion is seen in the lumbar spine with  flexion or extension.    PFSH:  The following portions of the patient's history were reviewed and updated as appropriate: problem list, past medical history, past surgery history, social history, family history, medications, and allergies    Review of Systems   Constitutional: Positive for fatigue.   HENT: Positive for congestion and postnasal drip.    Eyes: Negative for visual disturbance.   Respiratory: Negative for cough, shortness of breath and wheezing.    Cardiovascular: Negative.    Gastrointestinal: Positive for constipation. Negative for diarrhea.   Genitourinary: Positive for difficulty urinating.   Musculoskeletal: Positive for back pain.   Skin: Negative for rash and wound.   Allergic/Immunologic: Negative for immunocompromised state.   Neurological: Positive for weakness and numbness (toe on right foot).   Hematological: Does not bruise/bleed easily.   Psychiatric/Behavioral: Positive for sleep disturbance. Negative for suicidal ideas. The patient is nervous/anxious.    All other systems reviewed and are negative.      Vitals:    12/10/18 1345   BP: 113/80   Pulse: 85   Resp: 18   Temp: 98.7 °F (37.1 °C)   SpO2: 97%   Weight: 98.8 kg (217 lb 12.8 oz)   Height: 170.2 cm (67.01\")   PainSc:   4   PainLoc: Back       Physical Exam   Constitutional: She appears well-developed and " well-nourished. No distress.   HENT:   Head: Normocephalic and atraumatic.   Nose: Nose normal.   Mouth/Throat: Oropharynx is clear and moist.   Eyes: Conjunctivae and EOM are normal.   Neck: Normal range of motion. Neck supple.   Cardiovascular: Normal rate, regular rhythm and normal heart sounds.   Pulmonary/Chest: Effort normal and breath sounds normal. No stridor. No respiratory distress.   Abdominal: Soft. Bowel sounds are normal. She exhibits no distension. There is no tenderness.   Musculoskeletal:        Lumbar back: She exhibits decreased range of motion, tenderness and pain.   +bilateral lumbar facet tenderness  +bilateral lumbar facet loading    Neurological: She is alert. She has normal strength. No cranial nerve deficit or sensory deficit. Gait normal. Coordination and gait normal.   Skin: Skin is warm and dry. No rash noted. She is not diaphoretic.   Psychiatric: She has a normal mood and affect. Her speech is normal and behavior is normal.   Nursing note and vitals reviewed.    Back Exam     Tenderness   The patient is experiencing tenderness in the lumbar and sacroiliac.          Neurologic Exam     Mental Status   Speech: speech is normal     Cranial Nerves     CN III, IV, VI   Extraocular motions are normal.     Motor Exam     Strength   Strength 5/5 throughout.     Gait, Coordination, and Reflexes     Gait  Gait: normal      No results found for: POCMETH, POCAMPHET, POCBARBITUR, POCBENZO, POCCOCAINE, POCMETHADO, POCOPIATES, POCOXYCODO, POCPHENCYC, POCPROPOXY, POCTHC, POCTRICYC    Comments: Reviewed POC today -  No medication.     Date of last BARBARA reviewed : 12/10/18   Comments: Reggie Gandhi was seen today for back pain.    Diagnoses and all orders for this visit:    Chronic bilateral low back pain with right-sided sciatica  -     Case Request    DDD (degenerative disc disease), lumbar  -     Case Request    Lumbar spondylolysis  -     Case Request      Requested Prescriptions      No  prescriptions requested or ordered in this encounter     - Imaging reviewed with patient.    - RLE pain has significantly improved with surgery. Continues to have bilateral low back pain that sounds arthritic in origin. Worse with facet loading. Will perform two LMBB to see if she can get any pain relief.   - Discussed with the patient regarding the etiology of their pain. Informed them that they would likely benefit from a bilateral medial branch block from L3 to sacral ala.  The procedure was described in detail and the risks, benefits and alternatives were discussed with the patient (including but not limited to: bleeding, infection, nerve damage, worsening of pain, inability to perform injection, paralysis, seizures, and death) who agreed to proceed.    - Will perform two injections approx 1 month apart.  These will be diagnostic in nature.  If diagnostically positive, hopes to proceed with a therapeutic radiofrequency ablation in the future for longer lasting pain control.   - agree with weaning off gabapentin given radicular symptoms have improved. Not likely to help with arthritic pain. NSAIDS are the best medication - do not want her to continue long term. Hopefully she can stop after procedure.   - Continue home exercise program.     Wt Readings from Last 3 Encounters:   12/10/18 98.8 kg (217 lb 12.8 oz)   11/26/18 98.4 kg (217 lb)   11/14/18 95.7 kg (211 lb)     Body mass index is 34.1 kg/m². Patient counseled on the importance of weight loss to help with overall health and pain control. Patient instructed to attempt weight loss.   Plan: Calorie counting reduce portion size    Follow-up after injections.        Teresa Danielson MD  Pain Management

## 2018-12-11 ENCOUNTER — OFFICE VISIT (OUTPATIENT)
Dept: INTERNAL MEDICINE | Facility: CLINIC | Age: 43
End: 2018-12-11

## 2018-12-11 VITALS
HEIGHT: 67 IN | RESPIRATION RATE: 16 BRPM | DIASTOLIC BLOOD PRESSURE: 80 MMHG | TEMPERATURE: 98.4 F | OXYGEN SATURATION: 98 % | SYSTOLIC BLOOD PRESSURE: 116 MMHG | HEART RATE: 85 BPM | WEIGHT: 215 LBS | BODY MASS INDEX: 33.74 KG/M2

## 2018-12-11 DIAGNOSIS — F41.9 ANXIETY AND DEPRESSION: ICD-10-CM

## 2018-12-11 DIAGNOSIS — F32.A ANXIETY AND DEPRESSION: ICD-10-CM

## 2018-12-11 DIAGNOSIS — M51.17 INTERVERTEBRAL DISC DISORDER WITH RADICULOPATHY OF LUMBOSACRAL REGION: ICD-10-CM

## 2018-12-11 PROCEDURE — 99214 OFFICE O/P EST MOD 30 MIN: CPT | Performed by: INTERNAL MEDICINE

## 2018-12-11 RX ORDER — DULOXETIN HYDROCHLORIDE 60 MG/1
60 CAPSULE, DELAYED RELEASE ORAL DAILY
Qty: 30 CAPSULE | Refills: 1 | Status: SHIPPED | OUTPATIENT
Start: 2018-12-11 | End: 2019-02-05 | Stop reason: SDUPTHER

## 2018-12-11 NOTE — PROGRESS NOTES
Oksana Pillai is a 43 y.o. female, who presents with a chief complaint of   Chief Complaint   Patient presents with   • Follow-up     4 week f/u, patient feeling better    • Anxiety   • Depression       HPI     Pt here for follow up.  At her last Ov 1 mo ago she was having lots of issues with anxiety and back pain. She had tried zoloft but didn't feel well on the med.  She was switched to cymbalta.  Her mood is much better and she feels more leveled out.  She continues to have lots of back pain.  She has seen pain management and dr. Brown.  She is worried that her back pain may continue and she wont be able to get back to work.        The following portions of the patient's history were reviewed and updated as appropriate: allergies, current medications, past family history, past medical history, past social history, past surgical history and problem list.    Allergies: Patient has no known allergies.    Review of Systems   Constitutional: Negative.    HENT: Negative.    Eyes: Negative.    Respiratory: Negative.    Cardiovascular: Negative.    Gastrointestinal: Negative.    Endocrine: Negative.    Genitourinary: Negative.    Musculoskeletal: Positive for back pain.   Skin: Negative.    Allergic/Immunologic: Negative.    Neurological: Negative.    Hematological: Negative.    Psychiatric/Behavioral: Negative.  Negative for self-injury and suicidal ideas.   All other systems reviewed and are negative.            Wt Readings from Last 3 Encounters:   12/11/18 97.5 kg (215 lb)   12/10/18 98.8 kg (217 lb 12.8 oz)   11/26/18 98.4 kg (217 lb)     Temp Readings from Last 3 Encounters:   12/11/18 98.4 °F (36.9 °C) (Oral)   12/10/18 98.7 °F (37.1 °C)   10/11/18 98.1 °F (36.7 °C) (Oral)     BP Readings from Last 3 Encounters:   12/11/18 116/80   12/10/18 113/80   11/26/18 120/74     Pulse Readings from Last 3 Encounters:   12/11/18 85   12/10/18 85   11/26/18 72     Body mass index is 33.67  kg/m².  @LASTSAO2(3)@    Physical Exam   Constitutional: She is oriented to person, place, and time. She appears well-developed and well-nourished. No distress.   HENT:   Head: Normocephalic and atraumatic.   Right Ear: External ear normal.   Left Ear: External ear normal.   Nose: Nose normal.   Mouth/Throat: Oropharynx is clear and moist.   Eyes: Conjunctivae and EOM are normal. Pupils are equal, round, and reactive to light.   Neck: Normal range of motion. Neck supple.   Cardiovascular: Normal rate, regular rhythm, normal heart sounds and intact distal pulses.   Pulmonary/Chest: Effort normal and breath sounds normal. No respiratory distress. She has no wheezes.   Musculoskeletal: Normal range of motion.   Normal gait   Neurological: She is alert and oriented to person, place, and time.   Skin: Skin is warm and dry.   Psychiatric: She has a normal mood and affect. Her behavior is normal. Judgment and thought content normal.   Nursing note and vitals reviewed.      Results for orders placed or performed in visit on 12/10/18   POC Urine Drug Screen, Triage   Result Value Ref Range    Methamphetaine Screen, Urine Negative Negative    POC Amphetamines Negative Negative    Barbiturates Screen Negative Negative    Benzodiazepine Screen Negative Negative    Cocaine Screen Negative Negative    Methadone Screen Negative Negative    Opiate Screen Negative Negative    Oxycodone, Screen Negative Negative    Phencyclidine (PCP) Screen Negative Negative    Propoxyphene Screen Negative Negative    THC, Screen Negative Negative    Tricyclic Antidepressants Screen Negative Negative           Oksana was seen today for follow-up, anxiety and depression.    Diagnoses and all orders for this visit:    Anxiety and depression  -     DULoxetine (CYMBALTA) 60 MG capsule; Take 1 capsule by mouth Daily.    Intervertebral disc disorder with radiculopathy of lumbosacral region  -     DULoxetine (CYMBALTA) 60 MG capsule; Take 1 capsule by mouth  Daily.      Cont to try to work on back exercises and f/u with pain management.       Outpatient Medications Prior to Visit   Medication Sig Dispense Refill   • cyclobenzaprine (FLEXERIL) 10 MG tablet 1/2-1 po TID prn spasm 40 tablet 0   • fexofenadine (ALLEGRA) 60 MG tablet Take 60 mg by mouth Daily.     • gabapentin (NEURONTIN) 600 MG tablet TAKE TWO TABLETS BY MOUTH EVERY MORNING AND TAKE TWO TABLETS BY MOUTH EVERY EVENING 120 tablet 0   • ibuprofen (ADVIL,MOTRIN) 800 MG tablet Take 1 tablet by mouth Every 8 (Eight) Hours As Needed for Mild Pain . 90 tablet 1   • levonorgestrel (MIRENA) 20 MCG/24HR IUD 1 each by Intrauterine route 1 (One) Time.     • DULoxetine (CYMBALTA) 30 MG capsule Take 1 capsule by mouth Daily. 30 capsule 1     No facility-administered medications prior to visit.      New Medications Ordered This Visit   Medications   • DULoxetine (CYMBALTA) 60 MG capsule     Sig: Take 1 capsule by mouth Daily.     Dispense:  30 capsule     Refill:  1     [unfilled]  Medications Discontinued During This Encounter   Medication Reason   • DULoxetine (CYMBALTA) 30 MG capsule Reorder         Return in about 2 months (around 2/11/2019) for Recheck.

## 2018-12-15 ENCOUNTER — RESULTS ENCOUNTER (OUTPATIENT)
Dept: PAIN MEDICINE | Facility: CLINIC | Age: 43
End: 2018-12-15

## 2018-12-15 DIAGNOSIS — M51.36 DDD (DEGENERATIVE DISC DISEASE), LUMBAR: ICD-10-CM

## 2018-12-15 DIAGNOSIS — G89.29 CHRONIC BILATERAL LOW BACK PAIN WITH RIGHT-SIDED SCIATICA: ICD-10-CM

## 2018-12-15 DIAGNOSIS — M54.41 CHRONIC BILATERAL LOW BACK PAIN WITH RIGHT-SIDED SCIATICA: ICD-10-CM

## 2018-12-15 DIAGNOSIS — M43.06 LUMBAR SPONDYLOLYSIS: ICD-10-CM

## 2018-12-26 ENCOUNTER — OUTSIDE FACILITY SERVICE (OUTPATIENT)
Dept: PAIN MEDICINE | Facility: CLINIC | Age: 43
End: 2018-12-26

## 2018-12-26 ENCOUNTER — DOCUMENTATION (OUTPATIENT)
Dept: PAIN MEDICINE | Facility: CLINIC | Age: 43
End: 2018-12-26

## 2018-12-26 PROCEDURE — 64494 INJ PARAVERT F JNT L/S 2 LEV: CPT | Performed by: PAIN MEDICINE

## 2018-12-26 PROCEDURE — 64493 INJ PARAVERT F JNT L/S 1 LEV: CPT | Performed by: PAIN MEDICINE

## 2018-12-26 PROCEDURE — 64495 INJ PARAVERT F JNT L/S 3 LEV: CPT | Performed by: PAIN MEDICINE

## 2018-12-26 NOTE — PROGRESS NOTES
Bilateral L2-5 Lumbar Medial Branch Blockade  [facility]    PREOPERATIVE DIAGNOSIS:  Lumbar spondylosis without myelopathy    POSTOPERATIVE DIAGNOSIS:  Lumbar spondylosis without myelopathy    PROCEDURE:   Diagnostic Bilateral Lumbar Medial Branch Nerve Blockades, with fluoroscopy:  L2, L3, L4, and L5 nerves (at the L3, L4, L5 transverse processes and the sacral alar groove) to block facet joints L3-4, L4-5, and L5-S1  1. 08940-46 -- Bilateral Lumbar Facet blocks, 1st Level  2. 53900-39 -- Bilateral Lumbar Facet blocks, 2nd  Level  3. 36149-14 -- Bilateral Lumbar Facet blocks, 3rd Level    PRE-PROCEDURE DISCUSSION WITH PATIENT:    Risks and complications were discussed with the patient prior to starting the procedure and informed consent was obtained.      SURGEON:  Teresa Danielson MD    REASON FOR PROCEDURE:   The patient complains of pain that seems to have a significant axial component, Increased back pain on range of motion exams and Pain on extension of the lumbar spine    SEDATION:  Versed 4mg IV  ANESTHETIC:  Marcaine 0.25%  STEROID:  Methylprednisolone (DEPO MEDROL) 80mg/ml  TOTAL VOLUME OF SOLUTION: 8ml    DESCRIPTON OF PROCEDURE:  After obtaining informed consent, IV access was obtained in the preoperative area.   The patient was taken to the operating room.  The patient was placed in the prone position with a pillow under the abdomen. All pressure points were well padded.  EKG, blood pressure, and pulse oximeter were monitored.  The patient was monitored and sedated by the RN under my direction. The lumbosacral area was prepped with Chloraprep and draped in a sterile fashion. Under fluoroscopic guidance the transverse processes of the L3, L4, and L5 vertebrae at the junctions of the superior articular processes were identified on the right. Also identified was the groove between the ala and the superior articular process of the sacrum on the ipsilateral side.  Skin and subcutaneous  tissue were anesthetized with 1% lidocaine above each of these points. A 22-gauge spinal needle was introduced under fluoroscopic guidance at the above junctions. Aspiration was negative for blood and CSF.  After confirming the position of the needle with fluoroscope in all views,  1 mL of the anesthetic solution noted above was injected at each of these points.  Needles were removed intact from each of the areas.  A similar procedure was repeated to block the L2, L3, L4, and L5 nerves on the contralateral side.   Onset of analgesia was noted.  Vital signs remained stable throughout.      ESTIMATED BLOOD LOSS:  <5 mL  SPECIMENS:  none    COMPLICATIONS:   No complications were noted.    TOLERANCE & DISCHARGE CONDITION:    The patient tolerated the procedure well.  The patient was transported to the recovery area without difficulties.  The patient was discharged to home under the care of family in stable and satisfactory condition.    PLAN OF CARE:  1. The patient was given our standard instruction sheet.  2. We discussed that Lumbar Medial Branch Blockade is a diagnostic procedure in consideration for radiofrequency ablation if two diagnostic procedures prove to be positive for significant benefit.  If sustained relief of 6 to eight weeks is obtained, then an alternative plan could be therapeutic lumbar branch blockades.  3. The patient is asked to keep a pain log each hour for 8 hours after the procedure today.  4. The patient will  Repeat injection 4 wks.  5. The patient will resume all medications as per the medication reconciliation sheet.

## 2019-01-08 RX ORDER — GABAPENTIN 600 MG/1
TABLET ORAL
Qty: 120 TABLET | Refills: 0 | Status: SHIPPED | OUTPATIENT
Start: 2019-01-08 | End: 2019-02-04 | Stop reason: SDUPTHER

## 2019-01-25 ENCOUNTER — OFFICE VISIT (OUTPATIENT)
Dept: PAIN MEDICINE | Facility: CLINIC | Age: 44
End: 2019-01-25

## 2019-01-25 VITALS
DIASTOLIC BLOOD PRESSURE: 78 MMHG | OXYGEN SATURATION: 96 % | WEIGHT: 218 LBS | BODY MASS INDEX: 34.21 KG/M2 | HEART RATE: 93 BPM | SYSTOLIC BLOOD PRESSURE: 114 MMHG | TEMPERATURE: 97.4 F | RESPIRATION RATE: 18 BRPM | HEIGHT: 67 IN

## 2019-01-25 DIAGNOSIS — G89.29 CHRONIC BILATERAL LOW BACK PAIN WITHOUT SCIATICA: ICD-10-CM

## 2019-01-25 DIAGNOSIS — M43.06 LUMBAR SPONDYLOLYSIS: Primary | ICD-10-CM

## 2019-01-25 DIAGNOSIS — M54.50 CHRONIC BILATERAL LOW BACK PAIN WITHOUT SCIATICA: ICD-10-CM

## 2019-01-25 PROBLEM — M54.41 CHRONIC BILATERAL LOW BACK PAIN WITH RIGHT-SIDED SCIATICA: Status: RESOLVED | Noted: 2018-02-05 | Resolved: 2019-01-25

## 2019-01-25 PROCEDURE — 99213 OFFICE O/P EST LOW 20 MIN: CPT | Performed by: PAIN MEDICINE

## 2019-01-25 NOTE — PROGRESS NOTES
CHIEF COMPLAINT: Back Pain    HPI  Oksana Pillai is a 43 y.o. female.  She is here to follow up for Back Pain    Oksana Pillai is a 43 y.o. female  who presents to the office for follow-up.  She completed a Bilateral L2-5 Lumbar Medial Branch Blockade on 12/26/18. Patient reports 75% relief from the procedure for 2 weeks. Pain is gradually returning.  Since last visit their pain has remain unchanged.   Working with PT, aggravates pain.     The patient states their pain is a 6 on a scale of 1-10.  The patient describes this pain as constant dull and ache.  The pain is located in bilateral R>L low back and does not radiate. This painful problem is aggravated by physical activity, twisting, bending and lifting and is alleviated by past injection and pain medication.    Current pain medications:   Ibuprofen daily - helpful  Gabapentin 1200 mg bid     Past therapies:  Physical Therapy: yes (currently)  Chiropractor: no  Massage Therapy: yes  TENS: no  Neck or back surgery: yes (right L5, S1 microdiskectomy- April 2017 be Dr. MENDES)  Past pain management: no  Dry Needling- didn't help    Previous Injection: Bilateral L2-5 Lumbar Medial Branch Blockade on 12/26/18  Effect of Injection (%): 75%  Length of Relief: 2 weeks     PEG Assessment   What number best describes your pain on average in the past week? 6  What number best describes how, during the past week, pain has interfered with your enjoyment of life? 4  What number best describes how, during the past week, pain has interfered with your general activity? 5      Current Outpatient Medications:   •  cyclobenzaprine (FLEXERIL) 10 MG tablet, 1/2-1 po TID prn spasm, Disp: 40 tablet, Rfl: 0  •  DULoxetine (CYMBALTA) 60 MG capsule, Take 1 capsule by mouth Daily., Disp: 30 capsule, Rfl: 1  •  fexofenadine (ALLEGRA) 60 MG tablet, Take 60 mg by mouth Daily., Disp: , Rfl:   •  gabapentin (NEURONTIN) 600 MG tablet, TAKE TWO TABLETS BY MOUTH EVERY MORNING AND TAKE TWO TABLETS BY MOUTH  EVERY EVENING, Disp: 120 tablet, Rfl: 0  •  ibuprofen (ADVIL,MOTRIN) 800 MG tablet, Take 1 tablet by mouth Every 8 (Eight) Hours As Needed for Mild Pain ., Disp: 90 tablet, Rfl: 1  •  levonorgestrel (MIRENA) 20 MCG/24HR IUD, 1 each by Intrauterine route 1 (One) Time., Disp: , Rfl:     IMAGING  MRI LUMBAR SPINE WITH AND WITHOUT CONTRAST - 7/2017:  FINDINGS: There is moderate disc desiccation with mild to moderate loss  of disc height at L5-S1. Endplate degenerative changes are noted at  L5-S1, more prominent to the right with endplate degenerative changes  being slightly more prominent as compared to the prior examination.   L1-L2: There is no evidence of disc bulge or herniation.   L2-L3: There is no evidence of disc bulge or herniation.   L3-L4: Mild facet degenerative disease is present bilaterally. There is  no evidence of disc bulge or herniation.   L4-L5: Mild facet degenerative disease is present bilaterally.  L5-S1: A right laminotomy is noted. There is enhancing tissue anterior  lateral, lateral and to a lesser extent posterior lateral to the thecal  sac to the right consistent with granulation tissue. This abuts the  right S1 nerve root. There is no evidence of a disc herniation or disc  fragment.   IMPRESSION:  The patient is status post discectomy at L5-S1 on the right.  There is enhancing tissue in the epidural space anterolateral and  lateral to the thecal sac at L5-S1 abutting the right S1 nerve root.  There is no evidence of disc herniation or disc fragment.     LUMBAR SPINE PLAIN FILMS SERIES COMPLETE WITH ADDITIONAL FLEXION AND EXTENSION VIEWS OF THE LUMBAR SPINE 07/12/2017  FINDINGS: A total of 7 views of the lumbar spine including AP, lateral,  bilateral oblique and lateral flexion and extension views of the entire  lumbar spine and coned-down lateral view of the lumbosacral junction are  submitted for interpretation. There is mild disc space narrowing at  L5-S1. The remainder of the lumbar  "vertebral body heights and disc  spaces are well-maintained. There is mild bilateral facet overgrowth  from L4 to S1. No abnormal motion is seen in the lumbar spine with  flexion or extension.    Imaging last reviewed: 01/25/19     PFSH:  The following portions of the patient's history were reviewed and updated as appropriate: problem list, past medical history, past surgery history, social history, family history, medications, and allergies    Review of Systems   Constitutional: Positive for fatigue.   HENT: Negative for congestion.    Eyes: Negative for visual disturbance.   Respiratory: Negative for cough, shortness of breath and wheezing.    Cardiovascular: Negative.    Gastrointestinal: Negative for constipation and diarrhea.   Genitourinary: Negative for difficulty urinating.   Musculoskeletal: Positive for back pain.   Neurological: Positive for weakness (right leg). Negative for numbness.   Psychiatric/Behavioral: Positive for sleep disturbance. Negative for suicidal ideas. The patient is nervous/anxious.    All other systems reviewed and are negative.      Vitals:    01/25/19 1515   BP: 114/78   Pulse: 93   Resp: 18   Temp: 97.4 °F (36.3 °C)   SpO2: 96%   Weight: 98.9 kg (218 lb)   Height: 170.2 cm (67\")   PainSc:   6   PainLoc: Back       Physical Exam   Constitutional: She appears well-developed and well-nourished. No distress.   HENT:   Head: Normocephalic and atraumatic.   Nose: Nose normal.   Mouth/Throat: Oropharynx is clear and moist.   Eyes: Conjunctivae and EOM are normal.   Neck: Normal range of motion. Neck supple.   Pulmonary/Chest: Effort normal. No stridor. No respiratory distress.   Musculoskeletal:        Lumbar back: She exhibits decreased range of motion, tenderness and pain.   +bilateral lumbar facet tenderness  +bilateral lumbar facet loading    Neurological: She is alert. She has normal strength. No cranial nerve deficit or sensory deficit. Coordination and gait normal.   Skin: Skin is " warm and dry. No rash noted. She is not diaphoretic.   Psychiatric: She has a normal mood and affect. Her speech is normal and behavior is normal.   Nursing note and vitals reviewed.    Ortho Exam  Neurologic Exam     Mental Status   Speech: speech is normal     Cranial Nerves     CN III, IV, VI   Extraocular motions are normal.     Motor Exam     Strength   Strength 5/5 throughout.       Lab Results   Component Value Date    POCMETH Negative 12/10/2018    POCAMPHET Negative 12/10/2018    POCBARBITUR Negative 12/10/2018    POCBENZO Negative 12/10/2018    POCCOCAINE Negative 12/10/2018    POCMETHADO Negative 12/10/2018    POCOPIATES Negative 12/10/2018    POCOXYCODO Negative 12/10/2018    POCPHENCYC Negative 12/10/2018    POCPROPOXY Negative 12/10/2018    POCTHC Negative 12/10/2018    POCTRICYC Negative 12/10/2018     Last UDS results reviewed: 01/25/19   Last UDS: 12/10/2018  Comments: Consistent     Date of last BARBARA reviewed : 01/25/19   Comments: Reggie Jon was seen today for back pain.    Diagnoses and all orders for this visit:    Lumbar spondylolysis  -     Case Request    Chronic bilateral low back pain without sciatica  -     Case Request      Requested Prescriptions      No prescriptions requested or ordered in this encounter     - Imaging reviewed with patient.  facet disease seen.   - good relief with first bilateral L2-L5 LMBB, which is diagnostics for the source of her pain. Will repeat a 2nd injection to confirm diagnostically the source. If again successful, hopes to precede with a RFA for longer lasting relief.   - Continue home exercise program.     Wt Readings from Last 3 Encounters:   01/25/19 98.9 kg (218 lb)   12/11/18 97.5 kg (215 lb)   12/10/18 98.8 kg (217 lb 12.8 oz)     Body mass index is 34.14 kg/m².  weight gain of 1 lbs over the past  6 week(s)    Intentional?  No Patient counseled on the importance of weight loss to help with overall health and pain control. Patient  instructed to attempt weight loss.   Plan: Calorie counting  reduce screen time, cut out extra servings and reduce fast food intake    Follow-up after injection    Teresa Danielson MD  Pain Management

## 2019-02-04 RX ORDER — GABAPENTIN 600 MG/1
TABLET ORAL
Qty: 120 TABLET | Refills: 0 | Status: SHIPPED | OUTPATIENT
Start: 2019-02-04 | End: 2019-03-13 | Stop reason: SDUPTHER

## 2019-02-05 DIAGNOSIS — M51.17 INTERVERTEBRAL DISC DISORDER WITH RADICULOPATHY OF LUMBOSACRAL REGION: ICD-10-CM

## 2019-02-05 DIAGNOSIS — F41.9 ANXIETY AND DEPRESSION: ICD-10-CM

## 2019-02-05 DIAGNOSIS — F32.A ANXIETY AND DEPRESSION: ICD-10-CM

## 2019-02-05 RX ORDER — DULOXETIN HYDROCHLORIDE 60 MG/1
CAPSULE, DELAYED RELEASE ORAL
Qty: 90 CAPSULE | Refills: 1 | Status: SHIPPED | OUTPATIENT
Start: 2019-02-05 | End: 2019-08-15 | Stop reason: SDUPTHER

## 2019-02-06 ENCOUNTER — OUTSIDE FACILITY SERVICE (OUTPATIENT)
Dept: PAIN MEDICINE | Facility: CLINIC | Age: 44
End: 2019-02-06

## 2019-02-06 ENCOUNTER — DOCUMENTATION (OUTPATIENT)
Dept: PAIN MEDICINE | Facility: CLINIC | Age: 44
End: 2019-02-06

## 2019-02-06 PROCEDURE — 64495 INJ PARAVERT F JNT L/S 3 LEV: CPT | Performed by: PAIN MEDICINE

## 2019-02-06 PROCEDURE — 64493 INJ PARAVERT F JNT L/S 1 LEV: CPT | Performed by: PAIN MEDICINE

## 2019-02-06 PROCEDURE — 64494 INJ PARAVERT F JNT L/S 2 LEV: CPT | Performed by: PAIN MEDICINE

## 2019-02-06 NOTE — PROGRESS NOTES
Bilateral L2-5 Lumbar Medial Branch Blockade  [facility]    PREOPERATIVE DIAGNOSIS:  Lumbar spondylosis without myelopathy    POSTOPERATIVE DIAGNOSIS:  Lumbar spondylosis without myelopathy    PROCEDURE:   Diagnostic Bilateral Lumbar Medial Branch Nerve Blockades, with fluoroscopy:  L2, L3, L4, and L5 nerves (at the L3, L4, L5 transverse processes and the sacral alar groove) to block facet joints L3-4, L4-5, and L5-S1  1. 03669-98 -- Bilateral Lumbar Facet blocks, 1st Level  2. 46688-07 -- Bilateral Lumbar Facet blocks, 2nd  Level  3. 54596-44 -- Bilateral Lumbar Facet blocks, 3rd Level    PRE-PROCEDURE DISCUSSION WITH PATIENT:    Risks and complications were discussed with the patient prior to starting the procedure and informed consent was obtained.      SURGEON:  Teresa Danielson MD    REASON FOR PROCEDURE:   The patient complains of pain that seems to have a significant axial component, Increased back pain on range of motion exams and Pain on extension of the lumbar spine    SEDATION:  Versed 4mg IV  ANESTHETIC:  Marcaine 0.25%  STEROID:  Methylprednisolone (DEPO MEDROL) 80mg/ml  TOTAL VOLUME OF SOLUTION: 8ml    DESCRIPTON OF PROCEDURE:  After obtaining informed consent, IV access was obtained in the preoperative area.   The patient was taken to the operating room.  The patient was placed in the prone position with a pillow under the abdomen. All pressure points were well padded.  EKG, blood pressure, and pulse oximeter were monitored.  The patient was monitored and sedated by the RN under my direction. The lumbosacral area was prepped with Chloraprep and draped in a sterile fashion. Under fluoroscopic guidance the transverse processes of the L3, L4, and L5 vertebrae at the junctions of the superior articular processes were identified on the right. Also identified was the groove between the ala and the superior articular process of the sacrum on the ipsilateral side.  Skin and subcutaneous  tissue were anesthetized with 1% lidocaine above each of these points. A 22-gauge spinal needle was introduced under fluoroscopic guidance at the above junctions. Aspiration was negative for blood and CSF.  After confirming the position of the needle with fluoroscope in all views,  1 mL of the anesthetic solution noted above was injected at each of these points.  Needles were removed intact from each of the areas.  A similar procedure was repeated to block the L2, L3, L4, and L5 nerves on the contralateral side.   Onset of analgesia was noted.  Vital signs remained stable throughout.      ESTIMATED BLOOD LOSS:  <5 mL  SPECIMENS:  none    COMPLICATIONS:   No complications were noted.    TOLERANCE & DISCHARGE CONDITION:    The patient tolerated the procedure well.  The patient was transported to the recovery area without difficulties.  The patient was discharged to home under the care of family in stable and satisfactory condition.    PLAN OF CARE:  1. The patient was given our standard instruction sheet.  2. We discussed that Lumbar Medial Branch Blockade is a diagnostic procedure in consideration for radiofrequency ablation if two diagnostic procedures prove to be positive for significant benefit.  If sustained relief of 6 to eight weeks is obtained, then an alternative plan could be therapeutic lumbar branch blockades.  3. The patient is asked to keep a pain log each hour for 8 hours after the procedure today.  4. The patient will  Return to clinic 1 wk.  5. The patient will resume all medications as per the medication reconciliation sheet.

## 2019-02-13 ENCOUNTER — OFFICE VISIT (OUTPATIENT)
Dept: INTERNAL MEDICINE | Facility: CLINIC | Age: 44
End: 2019-02-13

## 2019-02-13 VITALS
OXYGEN SATURATION: 99 % | BODY MASS INDEX: 32.65 KG/M2 | HEIGHT: 67 IN | SYSTOLIC BLOOD PRESSURE: 118 MMHG | TEMPERATURE: 97.8 F | DIASTOLIC BLOOD PRESSURE: 64 MMHG | RESPIRATION RATE: 16 BRPM | WEIGHT: 208 LBS | HEART RATE: 80 BPM

## 2019-02-13 DIAGNOSIS — R79.89 ABNORMAL THYROID BLOOD TEST: ICD-10-CM

## 2019-02-13 DIAGNOSIS — F32.A ANXIETY AND DEPRESSION: Primary | ICD-10-CM

## 2019-02-13 DIAGNOSIS — F41.9 ANXIETY AND DEPRESSION: Primary | ICD-10-CM

## 2019-02-13 PROCEDURE — 99214 OFFICE O/P EST MOD 30 MIN: CPT | Performed by: INTERNAL MEDICINE

## 2019-02-14 LAB
ALBUMIN SERPL-MCNC: NORMAL G/DL
ALP SERPL-CCNC: NORMAL U/L
ALT SERPL-CCNC: NORMAL U/L
AST SERPL-CCNC: NORMAL U/L
BILIRUB SERPL-MCNC: NORMAL MG/DL
BUN SERPL-MCNC: NORMAL MG/DL
CALCIUM SERPL-MCNC: NORMAL MG/DL
CHLORIDE SERPL-SCNC: NORMAL MMOL/L
CO2 SERPL-SCNC: NORMAL MMOL/L
CREAT SERPL-MCNC: NORMAL MG/DL
GLUCOSE SERPL-MCNC: NORMAL MG/DL
POTASSIUM SERPL-SCNC: NORMAL MMOL/L
PROT SERPL-MCNC: NORMAL G/DL
SODIUM SERPL-SCNC: NORMAL MMOL/L
SPECIMEN STATUS: NORMAL
T4 FREE SERPL-MCNC: 0.96 NG/DL (ref 0.93–1.7)
TSH SERPL DL<=0.005 MIU/L-ACNC: 2.12 MIU/ML (ref 0.27–4.2)

## 2019-02-22 ENCOUNTER — TELEPHONE (OUTPATIENT)
Dept: INTERNAL MEDICINE | Facility: CLINIC | Age: 44
End: 2019-02-22

## 2019-02-22 NOTE — TELEPHONE ENCOUNTER
PATIENT AWARE    ----- Message from Apoorva Marroquin MD sent at 2/18/2019  4:45 PM EST -----  Call pt about labs.  Thyroid labs normal

## 2019-03-13 RX ORDER — GABAPENTIN 600 MG/1
TABLET ORAL
Qty: 360 TABLET | Refills: 3 | Status: SHIPPED | OUTPATIENT
Start: 2019-03-13 | End: 2019-06-24

## 2019-03-14 ENCOUNTER — OFFICE VISIT (OUTPATIENT)
Dept: PAIN MEDICINE | Facility: CLINIC | Age: 44
End: 2019-03-14

## 2019-03-14 VITALS
SYSTOLIC BLOOD PRESSURE: 111 MMHG | WEIGHT: 209 LBS | DIASTOLIC BLOOD PRESSURE: 71 MMHG | OXYGEN SATURATION: 96 % | RESPIRATION RATE: 16 BRPM | HEART RATE: 68 BPM | HEIGHT: 67 IN | TEMPERATURE: 97.8 F | BODY MASS INDEX: 32.8 KG/M2

## 2019-03-14 DIAGNOSIS — M54.50 CHRONIC BILATERAL LOW BACK PAIN WITHOUT SCIATICA: ICD-10-CM

## 2019-03-14 DIAGNOSIS — G89.29 CHRONIC BILATERAL LOW BACK PAIN WITHOUT SCIATICA: ICD-10-CM

## 2019-03-14 DIAGNOSIS — M43.06 LUMBAR SPONDYLOLYSIS: Primary | ICD-10-CM

## 2019-03-14 PROCEDURE — 99214 OFFICE O/P EST MOD 30 MIN: CPT | Performed by: NURSE PRACTITIONER

## 2019-03-14 NOTE — PROGRESS NOTES
CHIEF COMPLAINT  Pt reports having 80% pain reduction for 8 hours following 2/6/19 Bilateral L2-5 MBB #2.  Initial evaluation by Tracy TREJO--KW PATIENT.  Subjective   Oksana Pillai is a 43 y.o. female  who presents to the office for follow-up of procedure.  She completed a bilateral L2-L5 MBB   on  2-6-19 performed by Dr. GARCÍA for management of LOW BACK PAIN. Patient reports SIGNIFICANT TEMPORARY relief from the procedure.     Complains of pain in her low back. Today her pain is 6/10VAS. Describes the pain as nearly continuous aching and throbbing. Pain increases with walking, standing; pain decreases injections, PT and medication.  ADL's by self. Denies any bowel or bladder changes. Unable to perform in 10 miler this weekend.    Back Pain   This is a chronic problem. The current episode started more than 1 year ago. The problem occurs constantly. The problem has been gradually worsening since onset. The pain is present in the lumbar spine. The quality of the pain is described as aching. The pain does not radiate. The pain is at a severity of 6/10. The pain is moderate. The pain is worse during the day. The symptoms are aggravated by bending, position, standing and twisting. Associated symptoms include weakness (right leg). Pertinent negatives include no bladder incontinence, bowel incontinence or numbness. She has tried bed rest, chiropractic manipulation, heat, home exercises, ice, muscle relaxant and NSAIDs for the symptoms. The treatment provided mild relief.      Current pain medications:   Ibuprofen daily - helpful  Gabapentin 1200 mg bid     Past therapies:  Physical Therapy: yes (currently)  Chiropractor: no  Massage Therapy: yes  TENS: no  Neck or back surgery: yes (right L5, S1 microdiskectomy- April 2017 be Dr. MENDES)  Past pain management: no  Dry Needling- didn't help     Previous Injections: bilateral L2-L5 MBB 2-6-19  Effect of Injection (%): 80%  Length of Relief: 8 hours    Previous Injection:  "Bilateral L2-5 Lumbar Medial Branch Blockade on 12/26/18  Effect of Injection (%): 75%  Length of Relief: 2 weeks     PEG Assessment   What number best describes your pain on average in the past week?7  What number best describes how, during the past week, pain has interfered with your enjoyment of life?4  What number best describes how, during the past week, pain has interfered with your general activity?  4    The following portions of the patient's history were reviewed and updated as appropriate: allergies, current medications, past family history, past medical history, past social history, past surgical history and problem list.    Review of Systems   Constitutional: Positive for fatigue. Negative for activity change.   HENT: Negative for congestion.    Eyes: Negative for visual disturbance.   Respiratory: Negative for cough, shortness of breath and wheezing.    Cardiovascular: Negative.    Gastrointestinal: Negative for bowel incontinence, constipation, diarrhea and nausea.   Genitourinary: Negative for bladder incontinence and difficulty urinating.   Musculoskeletal: Positive for back pain.   Neurological: Positive for weakness (right leg). Negative for numbness.   Psychiatric/Behavioral: Positive for sleep disturbance. Negative for suicidal ideas. The patient is nervous/anxious.    All other systems reviewed and are negative.      Vitals:    03/14/19 0850   BP: 111/71   Pulse: 68   Resp: 16   Temp: 97.8 °F (36.6 °C)   SpO2: 96%   Weight: 94.8 kg (209 lb)   Height: 170.2 cm (67.01\")   PainSc: 6  Comment: LBP ranges from 3-6/10   PainLoc: Back     Objective   Physical Exam   Constitutional: She is oriented to person, place, and time. Vital signs are normal. She appears well-developed and well-nourished. She is cooperative.   HENT:   Head: Normocephalic and atraumatic.   Nose: Nose normal.   Eyes: Conjunctivae and lids are normal.   Cardiovascular: Normal rate.   Pulmonary/Chest: Effort normal.   Abdominal: " Normal appearance.   Musculoskeletal:        Lumbar back: She exhibits decreased range of motion, tenderness and bony tenderness.   bilateral L2-L5 facet tenderness--- + loading maneuver    Negative SLR bilaterally   Neurological: She is alert and oriented to person, place, and time. She has normal strength and normal reflexes. No cranial nerve deficit. Coordination and gait normal.   Reflex Scores:       Patellar reflexes are 2+ on the right side and 2+ on the left side.  Skin: Skin is warm, dry and intact.   Psychiatric: She has a normal mood and affect. Her speech is normal and behavior is normal. Judgment and thought content normal. Cognition and memory are normal.   Nursing note and vitals reviewed.      Assessment/Plan   Oksana was seen today for back pain.    Diagnoses and all orders for this visit:    Lumbar spondylolysis  -     Case Request    Chronic bilateral low back pain without sciatica      --- bilateral L2-L5 RFL. No blood thinners. Reviewed the procedure at length with the patient.  Included in the review was expectations, complications, risk and benefits.The procedure was described in detail and the risks, benefits and alternatives were discussed with the patient (including but not limited to: bleeding, infection, nerve damage, worsening of pain, inability to perform injection, paralysis, seizures, and death) who agreed to proceed.  Discussed the potential for sedation if warranted/wanted.  The procedure will plan to be performed at [facility] with fluoroscopic guidance(unless ultrasound is indicated). Questions were answered and in a way the patient could understand.  Patient verbalized understanding and wishes to proceed.  This intervention will be ordered.    --- Follow-up after procedure or sooner if needed.    -------  Education about Medial Branch Blockade and RF Therapy:    This medial branch blockade (MBB) suggested is intended for diagnostic purposes, with the intent of  "offering the patient Radiofrequency thermal rhizotomy (RF) if the MBB is diagnostically effective.  The diagnostic blockade is necessary to determine the likelihood that RF therapy could be efficacious in providing long term relief to the patient.    Medial branches are sensory nerve branches that connect to a facet joint and transmit sensations & pain signals from that joint.  Facet is a term for the type of joints found in the spine.  Medial branches are the nerves that go to a facet, and therefore are also sometimes called \"facet joint nerves\" (FJNs).      In a medial branch blockade procedure, xray fluoroscopy is used to verify the locations of the outside of the joint lines which are being targeted.  Under xray guidance, needles are placed to these areas.  Contrast dye is injected to confirm proper placement, with dye flowing over the joint area, and to ensure that the dye does not flow into unintended areas such as a vein.  When this is confirmed, local anesthetic is injected to block the medial branch at that joint level.      If MBBs are diagnostically successful in blocking pain, then the patient is most likely a great candidate for Radiofrequency of those facet joint nerves.  In the RF procedure, needles are placed to the joint lines in the same fashion, and after testing, the needle tips are heated to thermally treat the nerves, blocking the nerves by in essence damaging the nerves with the heat treatment.       Medically, a successful RF procedure should provide a patient with 50% pain relief or more for at least 6 months.  Clinical experience suggests that successful patients receive relief more in the range of 12 months on average.  We also discussed that a fortunate minority of patients receive therapeutic success from the MBB, and may not require RF ablation.  If a patient receives more than 8 weeks of relief from MBB, then occasional repeat MBB for therapeutic purposes is a very reasonable " alternative therapy.  This course of therapy is consistent with our LCDs according to our CMS  in the area, and therefore other insurance providers should follow accordingly.  We will monitor our patients to screen for these therapeutic responders and will offer RF therapy only when necessary.        We discussed that MBB & RF are not without risks.  Guidelines regarding anticoagulant use & neuraxial procedures will be respected.  Patients that are ill or otherwise may be at risk for sepsis will not have their spines accessed by neuraxial injections of any type.  This patient will not be offered these therapies if there is an increased risk.   We discussed that there is a risk of postprocedural pain and also a risk of worsening of clinical picture with these procedures as with any neuraxial procedure.    -------     BARBARA REPORT  BARBARA report has been reviewed and scanned into the patient's chart.    As the clinician, I personally reviewed the BARBARA from 3-12-19 while the patient was in the office today.          EMR Dragon/Transcription disclaimer:   Much of this encounter note is an electronic transcription/translation of spoken language to printed text. The electronic translation of spoken language may permit erroneous, or at times, nonsensical words or phrases to be inadvertently transcribed; Although I have reviewed the note for such errors, some may still exist.

## 2019-04-11 ENCOUNTER — OUTSIDE FACILITY SERVICE (OUTPATIENT)
Dept: PAIN MEDICINE | Facility: CLINIC | Age: 44
End: 2019-04-11

## 2019-04-11 ENCOUNTER — DOCUMENTATION (OUTPATIENT)
Dept: PAIN MEDICINE | Facility: CLINIC | Age: 44
End: 2019-04-11

## 2019-04-11 PROCEDURE — 99152 MOD SED SAME PHYS/QHP 5/>YRS: CPT | Performed by: ANESTHESIOLOGY

## 2019-04-11 PROCEDURE — 64635 DESTROY LUMB/SAC FACET JNT: CPT | Performed by: ANESTHESIOLOGY

## 2019-04-11 PROCEDURE — 64636 DESTROY L/S FACET JNT ADDL: CPT | Performed by: ANESTHESIOLOGY

## 2019-04-11 NOTE — PROGRESS NOTES
Bilateral L2-5 Lumbar Medial Branch RADIOFREQUENCY  [facility]      PREOPERATIVE DIAGNOSIS:  Lumbar spondylosis without myelopathy    POSTOPERATIVE DIAGNOSIS:  Lumbar spondylosis without myelopathy    PROCEDURE:   Diagnostic Bilateral Lumbar Medial Branch Nerve thermal radiofrequency lesioning, with fluoroscopy:  L2, L3, L4, and L5 nerves (at the L3, L4, L5 transverse processes and the sacral alar groove) to thermally treat the innervation to facet joints L3-4, L4-5, and L5-S1  1. 39719-13 -- Bilateral L/S facet neuro destr., 1st Level  2. 13042-80 -- Bilateral L/S facet neuro destr., 2nd  Level  3. 77167-59 -- Bilateral  L/S facet neuro destr., 3rd Level    PRE-PROCEDURE DISCUSSION WITH PATIENT:    Risks and complications were discussed with the patient prior to starting the procedure and informed consent was obtained.      SURGEON:  Iggy Bartlett MD    REASON FOR PROCEDURE:    The patient complains of pain that seems to have a significant axial component. Previous diagnostic positivity of MULTIPLE diagnostic injections to the same area.    SEDATION:  Versed 2mg & Fentanyl 100 mcg IV  TIME OF PROCEDURE:   The intraoperative procedure time after administration of the sedative was 40 minutes.       ANESTHETIC:  Lidocaine 2%  STEROID:  NONE      DESCRIPTON OF PROCEDURE:  After obtaining informed consent, IV access was obtained in the preoperative area.   The patient was taken to the operating room.  The patient was placed in the prone position with a pillow under the abdomen. All pressure points were well padded.  EKG, blood pressure, and pulse oximeter were monitored.  The patient was monitored and sedated by the RN under my direction. The lumbosacral area was prepped with Chloraprep and draped in a sterile fashion.     Under fluoroscopic guidance the transverse processes of the L3, L4, and L5 vertebrae at the junctions of the superior articular processes were identified on the right.  Also  identified was the groove between the ala and the superior articular process of the sacrum on the ipsilateral side.  Skin and subcutaneous tissue were anesthetized with 1ml of 1% lidocaine above each of these points. Then, radiofrequency probe needles were advanced in this fluoro view to the above junctions.  Aspiration was negative for blood and CSF.  After confirming the position of the needle with fluoroscope in all views, testing was initiated.  First, sensory testing was started on each needle a 1V and 50Hz and slowly decreased until painful pressure stimulation diminished at 0.5V.  Next, motor testing was confirmed to be negative at 3V and 2Hz for any radicular stimulation.  Then 1mL of the local anesthetic was instilled in each needle.  Two minutes elapsed, and during this time a lateral fluoroscopic view was confirmed again to ensure the needles had not advanced nor retracted.  Then, Radiofrequency Lesioning was initiated for 2 minutes at 85 degrees Celsius.  Needles were removed intact from each of the areas.     A similar procedure was repeated to address the L2, L3, L4, and L5 nerves on the contralateral side.   Onset of analgesia was noted.  Vital signs remained stable throughout.      ESTIMATED BLOOD LOSS:  <5 mL  SPECIMENS:  none    COMPLICATIONS:   No complications were noted., There was no indication of vascular uptake on live injection of contrast dye. and The patient did not have any signs of postprocedure numbness nor weakness.    TOLERANCE & DISCHARGE CONDITION:    The patient tolerated the procedure well.  The patient was transported to the recovery area without difficulties.  The patient was discharged to home under the care of family in stable and satisfactory condition.    PLAN OF CARE:  1. The patient was given our standard instruction sheet.  2. The patient will  Return to clinic 6 wks.  3. The patient will resume all medications as per the medication reconciliation sheet.

## 2019-05-22 ENCOUNTER — OFFICE VISIT (OUTPATIENT)
Dept: PAIN MEDICINE | Facility: CLINIC | Age: 44
End: 2019-05-22

## 2019-05-22 VITALS
SYSTOLIC BLOOD PRESSURE: 145 MMHG | HEART RATE: 68 BPM | TEMPERATURE: 98.4 F | HEIGHT: 67 IN | BODY MASS INDEX: 31.99 KG/M2 | WEIGHT: 203.8 LBS | RESPIRATION RATE: 16 BRPM | DIASTOLIC BLOOD PRESSURE: 76 MMHG | OXYGEN SATURATION: 96 %

## 2019-05-22 DIAGNOSIS — M47.816 SPONDYLOSIS OF LUMBAR REGION WITHOUT MYELOPATHY OR RADICULOPATHY: ICD-10-CM

## 2019-05-22 DIAGNOSIS — G89.4 CHRONIC PAIN SYNDROME: Primary | ICD-10-CM

## 2019-05-22 PROCEDURE — 99213 OFFICE O/P EST LOW 20 MIN: CPT | Performed by: ANESTHESIOLOGY

## 2019-05-22 NOTE — PROGRESS NOTES
CHIEF COMPLAINT  F/U back pain-Bilateral L2-5 Lumbar Medial Branch RADIOFREQUENCY- patient states that her pain has improved 65% since the procedure.        Subjective   Oksana Pillai is a 43 y.o. female  who presents to the office for follow-up of procedure.  She completed a bilateral lumbar medial branch radiofrequency lesioning as noted above   on April 11, 2018 performed by Dr. Bartlett for management of low back pain. Patient reports significant sustained relief from the procedure.     This patient had had diagnostic blockades previously that were performed by Dr. Danielson.    Back Pain   This is a chronic problem. The current episode started more than 1 year ago. The problem occurs constantly. The problem has been rapidly improving since onset. The pain is present in the lumbar spine. The quality of the pain is described as aching. The pain does not radiate. The pain is mild. The pain is worse during the day. The symptoms are aggravated by bending, position, standing and twisting. Associated symptoms include abdominal pain, headaches, numbness (right foot and leg) and weakness (right leg). Pertinent negatives include no bladder incontinence, bowel incontinence, dysuria or fever. She has tried bed rest, chiropractic manipulation, heat, home exercises, ice, muscle relaxant and NSAIDs for the symptoms. The treatment provided significant (Significant after RF) relief.        PEG Assessment   What number best describes your pain on average in the past week?3  What number best describes how, during the past week, pain has interfered with your enjoyment of life?5  What number best describes how, during the past week, pain has interfered with your general activity?  4    --  The aforementioned information the Chief Complaint section and above subjective data including any HPI data has been personally reviewed and affirmed.  --        The following portions of the patient's history were reviewed and updated as appropriate:  allergies, current medications, past family history, past medical history, past social history, past surgical history and problem list.    -------    The following portions of the patient's history were reviewed and updated as appropriate: allergies, current medications, past family history, past medical history, past social history, past surgical history and problem list.    No Known Allergies      Current Outpatient Medications:   •  cyclobenzaprine (FLEXERIL) 10 MG tablet, 1/2-1 po TID prn spasm, Disp: 40 tablet, Rfl: 0  •  DULoxetine (CYMBALTA) 60 MG capsule, TAKE ONE CAPSULE BY MOUTH DAILY, Disp: 90 capsule, Rfl: 1  •  fexofenadine (ALLEGRA) 60 MG tablet, Take 60 mg by mouth Daily., Disp: , Rfl:   •  gabapentin (NEURONTIN) 600 MG tablet, TAKE TWO TABLETS BY MOUTH EVERY MORNING AND TAKE TWO TABLETS BY MOUTH EVERY EVENING, Disp: 360 tablet, Rfl: 3  •  ibuprofen (ADVIL,MOTRIN) 800 MG tablet, Take 1 tablet by mouth Every 8 (Eight) Hours As Needed for Mild Pain ., Disp: 90 tablet, Rfl: 1  •  levonorgestrel (MIRENA) 20 MCG/24HR IUD, 1 each by Intrauterine route 1 (One) Time., Disp: , Rfl:     Current Outpatient Medications on File Prior to Visit   Medication Sig Dispense Refill   • cyclobenzaprine (FLEXERIL) 10 MG tablet 1/2-1 po TID prn spasm 40 tablet 0   • DULoxetine (CYMBALTA) 60 MG capsule TAKE ONE CAPSULE BY MOUTH DAILY 90 capsule 1   • fexofenadine (ALLEGRA) 60 MG tablet Take 60 mg by mouth Daily.     • gabapentin (NEURONTIN) 600 MG tablet TAKE TWO TABLETS BY MOUTH EVERY MORNING AND TAKE TWO TABLETS BY MOUTH EVERY EVENING 360 tablet 3   • ibuprofen (ADVIL,MOTRIN) 800 MG tablet Take 1 tablet by mouth Every 8 (Eight) Hours As Needed for Mild Pain . 90 tablet 1   • levonorgestrel (MIRENA) 20 MCG/24HR IUD 1 each by Intrauterine route 1 (One) Time.       No current facility-administered medications on file prior to visit.        Patient Active Problem List   Diagnosis   • Calf muscle weakness   • Intervertebral  disc disorder with radiculopathy of lumbosacral region   • Chronic bilateral low back pain without sciatica   • DDD (degenerative disc disease), lumbar   • Allergic rhinitis   • Anxiety and depression   • Suspected sleep apnea   • History of colonoscopy with polypectomy   • Obesity (BMI 30-39.9)   • TMJ (temporomandibular joint disorder)   • Hypersomnia with sleep apnea   • Sleep-related bruxism   • PLMD (periodic limb movement disorder)   • History of spinal surgery   • NSAID long-term use   • Lumbar spondylolysis       Past Medical History:   Diagnosis Date   • Allergic rhinitis    • Anxiety    • Chronic bilateral low back pain with right-sided sciatica 2/5/2018   • Depression    • GI bleed 2015    Polyp found on  c-scope and GI bleed during delivery of last baby    • History of colonoscopy with polypectomy 2/19/2018   • Lumbar herniated disc    • Numbness     RIGHT FOOT-FIRST THREE TOES   • Sciatica    • TMJ pain dysfunction syndrome        Past Surgical History:   Procedure Laterality Date   • CHOLECYSTECTOMY     • COLONOSCOPY     • LUMBAR DISCECTOMY FUSION INSTRUMENTATION Right 4/21/2017    Procedure: Right L5/S1 Metrx Microdiscectomy;  Surgeon: Brian Ojeda MD;  Location: [facility];  Service:        Family History   Adopted: Yes   Family history unknown: Yes       Social History     Socioeconomic History   • Marital status:      Spouse name: Not on file   • Number of children: Not on file   • Years of education: Not on file   • Highest education level: Not on file   Tobacco Use   • Smoking status: Never Smoker   • Smokeless tobacco: Never Used   Substance and Sexual Activity   • Alcohol use: Yes     Alcohol/week: 1.2 oz     Types: 2 Glasses of wine per week     Comment: SOCIAL   • Drug use: No   • Sexual activity: Yes     Partners: Male       -------        Review of Systems   Constitutional: Positive for fatigue. Negative for activity change, chills and fever.   HENT: Negative for congestion.  "   Eyes: Negative for visual disturbance.   Respiratory: Positive for shortness of breath. Negative for cough, chest tightness and wheezing.    Cardiovascular: Negative.    Gastrointestinal: Positive for abdominal pain and constipation. Negative for bowel incontinence, diarrhea and nausea.   Genitourinary: Negative for bladder incontinence, difficulty urinating and dysuria.   Musculoskeletal: Positive for back pain.   Allergic/Immunologic: Negative for immunocompromised state.   Neurological: Positive for weakness (right leg), numbness (right foot and leg) and headaches. Negative for dizziness and light-headedness.   Psychiatric/Behavioral: Positive for sleep disturbance. Negative for agitation and suicidal ideas. The patient is nervous/anxious.    All other systems reviewed and are negative.          Vitals:    05/22/19 0858   BP: 145/76   Pulse: 68   Resp: 16   Temp: 98.4 °F (36.9 °C)   SpO2: 96%   Weight: 92.4 kg (203 lb 12.8 oz)   Height: 170.2 cm (67\")   PainSc:   5   PainLoc: Back         Objective   Physical Exam   Constitutional: She is oriented to person, place, and time. Vital signs are normal. She appears well-developed and well-nourished.  Non-toxic appearance. No distress.   HENT:   Head: Normocephalic and atraumatic.   Right Ear: Hearing and external ear normal.   Left Ear: Hearing and external ear normal.   Nose: Nose normal.   Eyes: Conjunctivae and lids are normal. Pupils are equal, round, and reactive to light.   Pulmonary/Chest: Effort normal. No respiratory distress.   Abdominal: Normal appearance.   Musculoskeletal:        Lumbar back: She exhibits decreased range of motion (mild pain with extending past 20 deg).   Neurological: She is alert and oriented to person, place, and time. No cranial nerve deficit.   Psychiatric: She has a normal mood and affect. Her behavior is normal.   Nursing note and vitals reviewed.          Assessment/Plan   Oksana was seen today for back pain.    Diagnoses and all " orders for this visit:    Chronic pain syndrome    Spondylosis of lumbar region without myelopathy or radiculopathy        --- Follow-up PRN  -- continue her diligence with physical therapy    --The radiofrequency lesioning procedure could be repeated every 6 to 12 months as needed.  If her pain was to return it would be indicated to perform a single diagnostic lumbar medial branch blockade to confirm the diagnosis once again prior to repeating her radiofrequency lesioning, per the treatment algorithm most accepted by practice patterns and insurance expectations.           BARBARA REPORT  BARBARA report has been reviewed and scanned into the patient's chart.  Date of last BARBARA : as above.  No current use of opioids.        EMR Dragon/Transcription disclaimer:   Much of this encounter note is an electronic transcription/translation of spoken language to printed text. The electronic translation of spoken language may permit erroneous, or at times, nonsensical words or phrases to be inadvertently transcribed; Although I have reviewed the note for such errors, some may still exist.

## 2019-06-20 NOTE — PROGRESS NOTES
Subjective   Patient ID: Oksana Pillai is a 43 y.o. female for follow up after pain managemen at [facility],patient has had  MBB and RFA.  Patient states that the injections have helped.  She is also going to PT that is helping as well.  She is not currently scheduled for follow up with pain management    Patient was last seen 11.26.18 for low back pain and numbness right toes    Patient states that she is currently having intermittent moderate low back pain, no current leg pain, she does have right leg weakness and N/T right toes.    She is taking Gabapentin 600 mg ( 1/2) in am      It has been a little over 2 years since she underwent a right L5-S1 microdiskectomy. She did get the radiofrequency ablation and it did help with her back pain to the extent that she is able to do a little bit more exercise. She is working with a therapist who also does yoga. She asked if we could renew that if and when she needed to and I think that is fine. She was able to wean herself down to 300 mg once a day of gabapentin. She takes half of a 600 mg tablet. That is considerably less than she was taking before. If she wants to wean off completely she can, but I asked her to let us know if she decides to do that. I have been giving her the prescriptions. I will see her in 9 months. If there is a flare up she will let me know. She has not yet reentered the workplace. She wanted to know if she finds a job and if she would need a note indicating what some of the restrictions of her back would be such as getting a [employer] so she can alternate between sitting and standing because sitting does still continue to hurt her. I said that would be fine.                Back Pain   The problem occurs intermittently. The problem has been waxing and waning since onset. The pain is present in the lumbar spine. The pain does not radiate. The pain is at a severity of 7/10. The pain is moderate. The symptoms are aggravated by standing, sitting, twisting and  bending. Associated symptoms include numbness, tingling and weakness. Pertinent negatives include no leg pain.   Extremity Weakness    The pain is present in the right upper leg and right lower leg. The problem occurs constantly. The pain is at a severity of 6/10. The pain is moderate. Associated symptoms include numbness and tingling.       The following portions of the patient's history were reviewed and updated as appropriate: allergies, current medications, past family history, past medical history, past social history, past surgical history and problem list.    Review of Systems   Musculoskeletal: Positive for arthralgias, back pain and extremity weakness.   Neurological: Positive for tingling, weakness and numbness.   All other systems reviewed and are negative.      Objective   Physical Exam   Constitutional: She is oriented to person, place, and time. She appears well-developed and well-nourished.   HENT:   Head: Normocephalic and atraumatic.   Eyes: Conjunctivae and EOM are normal. Pupils are equal, round, and reactive to light.   Fundoscopic exam:       The right eye shows no papilledema. The right eye shows venous pulsations.        The left eye shows no papilledema. The left eye shows venous pulsations.   Neck: Carotid bruit is not present.   Neurological: She is oriented to person, place, and time. She has a normal Finger-Nose-Finger Test and a normal Heel to Shin Test. Gait normal.   Reflex Scores:       Tricep reflexes are 2+ on the right side and 2+ on the left side.       Bicep reflexes are 2+ on the right side and 2+ on the left side.       Brachioradialis reflexes are 2+ on the right side and 2+ on the left side.       Patellar reflexes are 2+ on the right side and 2+ on the left side.       Achilles reflexes are 2+ on the right side and 2+ on the left side.  Psychiatric: Her speech is normal.     Neurologic Exam     Mental Status   Oriented to person, place, and time.   Registration of memory:  Good recent and remote memory.   Attention: normal. Concentration: normal.   Speech: speech is normal   Level of consciousness: alert  Knowledge: consistent with education.     Cranial Nerves     CN II   Visual fields full to confrontation.   Visual acuity: normal    CN III, IV, VI   Pupils are equal, round, and reactive to light.  Extraocular motions are normal.     CN V   Facial sensation intact.   Right corneal reflex: normal  Left corneal reflex: normal    CN VII   Facial expression full, symmetric.   Right facial weakness: none  Left facial weakness: none    CN VIII   Hearing: intact    CN IX, X   Palate: symmetric    CN XI   Right sternocleidomastoid strength: normal  Left sternocleidomastoid strength: normal    CN XII   Tongue: not atrophic  Tongue deviation: none    Motor Exam   Muscle bulk: normal  Right arm tone: normal  Left arm tone: normal  Right leg tone: normal  Left leg tone: normal    Strength   Strength 5/5 except as noted.     Sensory Exam   Light touch normal.     Gait, Coordination, and Reflexes     Gait  Gait: normal    Coordination   Finger to nose coordination: normal  Heel to shin coordination: normal    Reflexes   Right brachioradialis: 2+  Left brachioradialis: 2+  Right biceps: 2+  Left biceps: 2+  Right triceps: 2+  Left triceps: 2+  Right patellar: 2+  Left patellar: 2+  Right achilles: 2+  Left achilles: 2+  Right : 2+  Left : 2+      Assessment/Plan   Independent Review of Radiographic Studies:    The postoperative MRI done in July 2017 showed no evidence of recurrent herniated disc on the right at L5-S1.  Quite a bit of facet disease however.  Agree with the report.      Medical Decision Making:    Overall doing well.  She will continue her own exercises and if she needs a renewal for her physical therapy she will let us know.  She will continue on taking 3 mg once a day of the gabapentin.  If she feels that she is able to wean it off completely she will let us know.   Otherwise I will see her in 9 months since we are continuing to prescribe it.    There are no diagnoses linked to this encounter.  No Follow-up on file.

## 2019-06-24 ENCOUNTER — OFFICE VISIT (OUTPATIENT)
Dept: NEUROSURGERY | Facility: CLINIC | Age: 44
End: 2019-06-24

## 2019-06-24 VITALS
BODY MASS INDEX: 30.92 KG/M2 | WEIGHT: 197 LBS | DIASTOLIC BLOOD PRESSURE: 79 MMHG | SYSTOLIC BLOOD PRESSURE: 110 MMHG | HEIGHT: 67 IN | HEART RATE: 72 BPM

## 2019-06-24 DIAGNOSIS — M54.41 CHRONIC BILATERAL LOW BACK PAIN WITH RIGHT-SIDED SCIATICA: ICD-10-CM

## 2019-06-24 DIAGNOSIS — M51.36 DDD (DEGENERATIVE DISC DISEASE), LUMBAR: Primary | ICD-10-CM

## 2019-06-24 DIAGNOSIS — G89.29 CHRONIC BILATERAL LOW BACK PAIN WITH RIGHT-SIDED SCIATICA: ICD-10-CM

## 2019-06-24 DIAGNOSIS — Z98.890 HISTORY OF SPINAL SURGERY: ICD-10-CM

## 2019-06-24 PROCEDURE — 99213 OFFICE O/P EST LOW 20 MIN: CPT | Performed by: NEUROLOGICAL SURGERY

## 2019-06-24 RX ORDER — GABAPENTIN 600 MG/1
600 TABLET ORAL 3 TIMES DAILY
COMMUNITY
End: 2020-09-24

## 2019-08-07 DIAGNOSIS — R79.89 ABNORMAL THYROID BLOOD TEST: ICD-10-CM

## 2019-08-07 DIAGNOSIS — Z00.00 ROUTINE ADULT HEALTH MAINTENANCE: Primary | ICD-10-CM

## 2019-08-08 LAB
ALBUMIN SERPL-MCNC: 4.1 G/DL (ref 3.5–5.2)
ALBUMIN/GLOB SERPL: 1.3 G/DL
ALP SERPL-CCNC: 110 U/L (ref 39–117)
ALT SERPL-CCNC: 25 U/L (ref 1–33)
AST SERPL-CCNC: 27 U/L (ref 1–32)
BASOPHILS # BLD AUTO: (no result) 10*3/UL
BILIRUB SERPL-MCNC: 0.7 MG/DL (ref 0.2–1.2)
BUN SERPL-MCNC: 13 MG/DL (ref 6–20)
BUN/CREAT SERPL: 12.7 (ref 7–25)
CALCIUM SERPL-MCNC: 9.4 MG/DL (ref 8.6–10.5)
CHLORIDE SERPL-SCNC: 102 MMOL/L (ref 98–107)
CHOLEST SERPL-MCNC: 181 MG/DL (ref 0–200)
CO2 SERPL-SCNC: 27.8 MMOL/L (ref 22–29)
CREAT SERPL-MCNC: 1.02 MG/DL (ref 0.57–1)
DIFFERENTIAL COMMENT: ABNORMAL
EOSINOPHIL # BLD AUTO: (no result) 10*3/UL
EOSINOPHIL # BLD MANUAL: 0.24 10*3/MM3 (ref 0–0.4)
EOSINOPHIL NFR BLD AUTO: (no result) %
EOSINOPHIL NFR BLD MANUAL: 5.1 % (ref 0.3–6.2)
ERYTHROCYTE [DISTWIDTH] IN BLOOD BY AUTOMATED COUNT: 12.6 % (ref 12.3–15.4)
GLOBULIN SER CALC-MCNC: 3.2 GM/DL
GLUCOSE SERPL-MCNC: 95 MG/DL (ref 65–99)
HCT VFR BLD AUTO: 45.3 % (ref 34–46.6)
HDLC SERPL-MCNC: 45 MG/DL (ref 40–60)
HGB BLD-MCNC: 14.5 G/DL (ref 12–15.9)
LDLC SERPL CALC-MCNC: 120 MG/DL (ref 0–100)
LDLC/HDLC SERPL: 2.66 {RATIO}
LYMPHOCYTES # BLD AUTO: (no result) 10*3/UL
LYMPHOCYTES # BLD MANUAL: 2.84 10*3/MM3 (ref 0.7–3.1)
LYMPHOCYTES NFR BLD AUTO: (no result) %
LYMPHOCYTES NFR BLD MANUAL: 60.6 % (ref 19.6–45.3)
MCH RBC QN AUTO: 31.3 PG (ref 26.6–33)
MCHC RBC AUTO-ENTMCNC: 32 G/DL (ref 31.5–35.7)
MCV RBC AUTO: 97.8 FL (ref 79–97)
MONOCYTES # BLD MANUAL: 0.14 10*3/MM3 (ref 0.1–0.9)
MONOCYTES NFR BLD AUTO: (no result) %
MONOCYTES NFR BLD MANUAL: 3 % (ref 5–12)
NEUTROPHILS # BLD MANUAL: 1.46 10*3/MM3 (ref 1.7–7)
NEUTROPHILS NFR BLD AUTO: (no result) %
NEUTROPHILS NFR BLD MANUAL: 31.3 % (ref 42.7–76)
PLATELET # BLD AUTO: 182 10*3/MM3 (ref 140–450)
PLATELET BLD QL SMEAR: ABNORMAL
POTASSIUM SERPL-SCNC: 4.4 MMOL/L (ref 3.5–5.2)
PROT SERPL-MCNC: 7.3 G/DL (ref 6–8.5)
RBC # BLD AUTO: 4.63 10*6/MM3 (ref 3.77–5.28)
RBC MORPH BLD: ABNORMAL
SODIUM SERPL-SCNC: 142 MMOL/L (ref 136–145)
T4 FREE SERPL-MCNC: 0.81 NG/DL (ref 0.93–1.7)
TRIGL SERPL-MCNC: 82 MG/DL (ref 0–150)
TSH SERPL DL<=0.005 MIU/L-ACNC: 1.42 MIU/ML (ref 0.27–4.2)
VLDLC SERPL CALC-MCNC: 16.4 MG/DL
WBC # BLD AUTO: 4.68 10*3/MM3 (ref 3.4–10.8)

## 2019-08-13 ENCOUNTER — TELEPHONE (OUTPATIENT)
Dept: PAIN MEDICINE | Facility: CLINIC | Age: 44
End: 2019-08-13

## 2019-08-13 NOTE — TELEPHONE ENCOUNTER
Patient called and stated that her pain has returned and she was wondering if hse could get the process started for her to have another ablation. I had her schedule a F/U. She wanted to know if she had to come in for a f/u prior to getting the process started.

## 2019-08-15 ENCOUNTER — OFFICE VISIT (OUTPATIENT)
Dept: INTERNAL MEDICINE | Facility: CLINIC | Age: 44
End: 2019-08-15

## 2019-08-15 VITALS
WEIGHT: 193 LBS | SYSTOLIC BLOOD PRESSURE: 116 MMHG | BODY MASS INDEX: 30.29 KG/M2 | HEIGHT: 67 IN | DIASTOLIC BLOOD PRESSURE: 74 MMHG | RESPIRATION RATE: 16 BRPM | HEART RATE: 77 BPM | OXYGEN SATURATION: 97 % | TEMPERATURE: 98.9 F

## 2019-08-15 DIAGNOSIS — F41.9 ANXIETY AND DEPRESSION: Primary | ICD-10-CM

## 2019-08-15 DIAGNOSIS — F32.A ANXIETY AND DEPRESSION: Primary | ICD-10-CM

## 2019-08-15 DIAGNOSIS — M51.36 DDD (DEGENERATIVE DISC DISEASE), LUMBAR: ICD-10-CM

## 2019-08-15 DIAGNOSIS — R74.8 ELEVATED CREATINE KINASE: ICD-10-CM

## 2019-08-15 DIAGNOSIS — M51.17 INTERVERTEBRAL DISC DISORDER WITH RADICULOPATHY OF LUMBOSACRAL REGION: ICD-10-CM

## 2019-08-15 DIAGNOSIS — H69.82 ETD (EUSTACHIAN TUBE DYSFUNCTION), LEFT: ICD-10-CM

## 2019-08-15 DIAGNOSIS — D72.820 LYMPHOCYTOSIS: ICD-10-CM

## 2019-08-15 PROBLEM — Z79.1 NSAID LONG-TERM USE: Status: RESOLVED | Noted: 2018-08-21 | Resolved: 2019-08-15

## 2019-08-15 PROCEDURE — 99214 OFFICE O/P EST MOD 30 MIN: CPT | Performed by: INTERNAL MEDICINE

## 2019-08-15 RX ORDER — FLUTICASONE PROPIONATE 50 MCG
2 SPRAY, SUSPENSION (ML) NASAL DAILY
COMMUNITY
End: 2021-08-11

## 2019-08-15 RX ORDER — DULOXETIN HYDROCHLORIDE 60 MG/1
60 CAPSULE, DELAYED RELEASE ORAL DAILY
Qty: 90 CAPSULE | Refills: 3 | Status: SHIPPED | OUTPATIENT
Start: 2019-08-15 | End: 2019-11-12

## 2019-08-15 NOTE — PROGRESS NOTES
Oksana Pillai is a 43 y.o. female, who presents with a chief complaint of   Chief Complaint   Patient presents with   • Follow-up     6 x month f/u, lab results   • Hyperlipidemia   • Ear Drainage     L ear, painful, through the night        44 yo F here for follow up of her HLD, depression/anxiety, and pain.     She is taking Cymbalta and feeling good on that. It has helped with her mood and anxiety. Feels that she can manage life.     She is seeing the [facility]. She is seeing Felicia Rodriguez and does PT there for her lower back. She is focusing on a non-inflammatory diet without gluten and dairy. She is still taking Gabapentin prescribed by Dr. Jarrett. Still having some pain and is taking 300mg of Gabapentin in the AM. He has appointment with Dr. Bartlett to consider another ablation.     Her tonsils are bothering her and she has stones. Left ear is hurting at night. Has never used Flonase. Taking Allegra that she feels does help.     She has HLD that is mild. Gone up a little bit, but is eating better. Not walking as much due to her back.     She is not taking a lot of Ibuprofen and did not drink a lot of water prior to her labs.          The following portions of the patient's history were reviewed and updated as appropriate: allergies, current medications, past family history, past medical history, past social history, past surgical history and problem list.    Allergies: Patient has no known allergies.    Current Outpatient Medications:   •  cyclobenzaprine (FLEXERIL) 10 MG tablet, 1/2-1 po TID prn spasm, Disp: 40 tablet, Rfl: 0  •  DULoxetine (CYMBALTA) 60 MG capsule, Take 1 capsule by mouth Daily., Disp: 90 capsule, Rfl: 3  •  fexofenadine (ALLEGRA) 60 MG tablet, Take 60 mg by mouth Daily., Disp: , Rfl:   •  fluticasone (FLONASE) 50 MCG/ACT nasal spray, 2 sprays into the nostril(s) as directed by provider Daily., Disp: , Rfl:   •  gabapentin (NEURONTIN) 600 MG tablet, Take 600 mg by  "mouth 3 (Three) Times a Day. 1/2 tablet  In am, Disp: , Rfl:   •  levonorgestrel (MIRENA) 20 MCG/24HR IUD, 1 each by Intrauterine route 1 (One) Time., Disp: , Rfl:   Medications Discontinued During This Encounter   Medication Reason   • ibuprofen (ADVIL,MOTRIN) 800 MG tablet *Therapy completed   • DULoxetine (CYMBALTA) 60 MG capsule Reorder       Review of Systems   Constitutional: Positive for fatigue.   HENT: Positive for congestion, ear pain (left ear ) and sore throat (due to throat stones ).    Respiratory: Negative for cough and shortness of breath.    Musculoskeletal: Positive for back pain.             /74 (BP Location: Left arm, Patient Position: Sitting, Cuff Size: Adult)   Pulse 77   Temp 98.9 °F (37.2 °C) (Oral)   Resp 16   Ht 170.2 cm (67\")   Wt 87.5 kg (193 lb)   SpO2 97%   BMI 30.23 kg/m²       Physical Exam   Constitutional: She is oriented to person, place, and time. She appears well-developed and well-nourished. No distress.   HENT:   Head: Normocephalic and atraumatic.   Right Ear: Hearing, tympanic membrane, external ear and ear canal normal.   Left Ear: Hearing, tympanic membrane, external ear and ear canal normal.   Nose: Nose normal.   Mouth/Throat: Uvula is midline, oropharynx is clear and moist and mucous membranes are normal. No oropharyngeal exudate. Tonsils are 1+ on the right. Tonsils are 1+ on the left. No tonsillar exudate (crypts, but no stones ).   Eyes: Conjunctivae are normal. Right eye exhibits no discharge. Left eye exhibits no discharge. No scleral icterus.   Neck: Neck supple.   Cardiovascular: Normal rate, regular rhythm and normal heart sounds. Exam reveals no gallop and no friction rub.   No murmur heard.  Pulmonary/Chest: Effort normal and breath sounds normal. No respiratory distress. She has no wheezes. She has no rales.   Lymphadenopathy:     She has no cervical adenopathy.   Neurological: She is alert and oriented to person, place, and time.   Skin: Skin is " warm. Capillary refill takes less than 2 seconds. No rash noted.   Psychiatric: She has a normal mood and affect. Her behavior is normal.   Nursing note and vitals reviewed.        Results for orders placed or performed in visit on 08/07/19   Comprehensive metabolic panel   Result Value Ref Range    Glucose 95 65 - 99 mg/dL    BUN 13 6 - 20 mg/dL    Creatinine 1.02 (H) 0.57 - 1.00 mg/dL    eGFR Non African Am 60 (L) >60 mL/min/1.73    eGFR African Am 72 >60 mL/min/1.73    BUN/Creatinine Ratio 12.7 7.0 - 25.0    Sodium 142 136 - 145 mmol/L    Potassium 4.4 3.5 - 5.2 mmol/L    Chloride 102 98 - 107 mmol/L    Total CO2 27.8 22.0 - 29.0 mmol/L    Calcium 9.4 8.6 - 10.5 mg/dL    Total Protein 7.3 6.0 - 8.5 g/dL    Albumin 4.10 3.50 - 5.20 g/dL    Globulin 3.2 gm/dL    A/G Ratio 1.3 g/dL    Total Bilirubin 0.7 0.2 - 1.2 mg/dL    Alkaline Phosphatase 110 39 - 117 U/L    AST (SGOT) 27 1 - 32 U/L    ALT (SGPT) 25 1 - 33 U/L   TSH   Result Value Ref Range    TSH 1.420 0.270 - 4.200 mIU/mL   T4, free   Result Value Ref Range    Free T4 0.81 (L) 0.93 - 1.70 ng/dL   Lipid Panel With LDL / HDL Ratio   Result Value Ref Range    Total Cholesterol 181 0 - 200 mg/dL    Triglycerides 82 0 - 150 mg/dL    HDL Cholesterol 45 40 - 60 mg/dL    VLDL Cholesterol 16.4 mg/dL    LDL Cholesterol  120 (H) 0 - 100 mg/dL    LDL/HDL Ratio 2.66    Manual Differential   Result Value Ref Range    Neutrophil Rel % 31.3 (L) 42.7 - 76.0 %    Lymphocyte Rel % 60.6 (H) 19.6 - 45.3 %    Monocyte Rel % 3.0 (L) 5.0 - 12.0 %    Eosinophil Rel % 5.1 0.3 - 6.2 %    Neutrophils Absolute 1.46 (L) 1.70 - 7.00 10*3/mm3    Lymphocytes Absolute 2.84 0.70 - 3.10 10*3/mm3    Monocytes Absolute 0.14 0.10 - 0.90 10*3/mm3    Eosinophil Abs 0.24 0.00 - 0.40 10*3/mm3    Differential Comment Comment     Comment Comment     Plt Comment Comment    CBC & Differential   Result Value Ref Range    WBC 4.68 3.40 - 10.80 10*3/mm3    RBC 4.63 3.77 - 5.28 10*6/mm3    Hemoglobin 14.5  12.0 - 15.9 g/dL    Hematocrit 45.3 34.0 - 46.6 %    MCV 97.8 (H) 79.0 - 97.0 fL    MCH 31.3 26.6 - 33.0 pg    MCHC 32.0 31.5 - 35.7 g/dL    RDW 12.6 12.3 - 15.4 %    Platelets 182 140 - 450 10*3/mm3    Neutrophil Rel % CANCELED     Lymphocyte Rel % CANCELED     Monocyte Rel % CANCELED     Eosinophil Rel % CANCELED     Lymphocytes Absolute CANCELED     Eosinophils Absolute CANCELED     Basophils Absolute CANCELED            Oksana was seen today for follow-up, hyperlipidemia and ear drainage.    Diagnoses and all orders for this visit:    Anxiety and depression  -     DULoxetine (CYMBALTA) 60 MG capsule; Take 1 capsule by mouth Daily.    DDD (degenerative disc disease), lumbar    Elevated creatine kinase  -     Basic Metabolic Panel    Lymphocytosis  -     CBC & Differential    ETD (Eustachian tube dysfunction), left    Intervertebral disc disorder with radiculopathy of lumbosacral region  -     DULoxetine (CYMBALTA) 60 MG capsule; Take 1 capsule by mouth Daily.      She is doing really well from anxiety standpoint. Feels well on this and sent into pharmacy for her.     She is going to see Dr. Bartlett for her back. She is having issues off and on and wants to consider ablation     Add Flonase for ETD. Will consider Singulair in the future.     Repeat CBC and BMP to monitor mild leukocytosis and elevated Cr (not drinking enough water) and is likely related to dehydration.         Return in about 1 year (around 8/15/2020) for Annual physical, Recheck.    Sona Orellana MD  08/15/2019

## 2019-08-15 NOTE — PATIENT INSTRUCTIONS
Eustachian Tube Dysfunction    The eustachian tube connects the middle ear to the back of the nose. It regulates air pressure in the middle ear by allowing air to move between the ear and nose. It also helps to drain fluid from the middle ear space. When the eustachian tube does not function properly, air pressure, fluid, or both can build up in the middle ear.  Eustachian tube dysfunction can affect one or both ears.  What are the causes?  This condition happens when the eustachian tube becomes blocked or cannot open normally. This may result from:  · Ear infections.  · Colds and other upper respiratory infections.  · Allergies.  · Irritation, such as from cigarette smoke or acid from the stomach coming up into the esophagus (gastroesophageal reflux).  · Sudden changes in air pressure, such as from descending in an airplane.  · Abnormal growths in the nose or throat, such as nasal polyps, tumors, or enlarged tissue at the back of the throat (adenoids).  What increases the risk?  This condition may be more likely to develop in people who smoke and people who are overweight. Eustachian tube dysfunction may also be more likely to develop in children, especially children who have:  · Certain birth defects of the mouth, such as cleft palate.  · Large tonsils and adenoids.  What are the signs or symptoms?  Symptoms of this condition may include:  · A feeling of fullness in the ear.  · Ear pain.  · Clicking or popping noises in the ear.  · Ringing in the ear.  · Hearing loss.  · Loss of balance.  Symptoms may get worse when the air pressure around you changes, such as when you travel to an area of high elevation or fly on an airplane.  How is this diagnosed?  This condition may be diagnosed based on:  · Your symptoms.  · A physical exam of your ear, nose, and throat.  · Tests, such as those that measure:  ? The movement of your eardrum (tympanogram).  ? Your hearing (audiometry).  How is this treated?  Treatment depends  "on the cause and severity of your condition. If your symptoms are mild, you may be able to relieve your symptoms by moving air into (\"popping\") your ears. If you have symptoms of fluid in your ears, treatment may include:  · Decongestants.  · Antihistamines.  · Nasal sprays or ear drops that contain medicines that reduce swelling (steroids).  In some cases, you may need to have a procedure to drain the fluid in your eardrum (myringotomy). In this procedure, a small tube is placed in the eardrum to:  · Drain the fluid.  · Restore the air in the middle ear space.  Follow these instructions at home:  · Take over-the-counter and prescription medicines only as told by your health care provider.  · Use techniques to help pop your ears as recommended by your health care provider. These may include:  ? Chewing gum.  ? Yawning.  ? Frequent, forceful swallowing.  ? Closing your mouth, holding your nose closed, and gently blowing as if you are trying to blow air out of your nose.  · Do not do any of the following until your health care provider approves:  ? Travel to high altitudes.  ? Fly in airplanes.  ? Work in a pressurized cabin or room.  ? Scuba dive.  · Keep your ears dry. Dry your ears completely after showering or bathing.  · Do not smoke.  · Keep all follow-up visits as told by your health care provider. This is important.  Contact a health care provider if:  · Your symptoms do not go away after treatment.  · Your symptoms come back after treatment.  · You are unable to pop your ears.  · You have:  ? A fever.  ? Pain in your ear.  ? Pain in your head or neck.  ? Fluid draining from your ear.  · Your hearing suddenly changes.  · You become very dizzy.  · You lose your balance.  This information is not intended to replace advice given to you by your health care provider. Make sure you discuss any questions you have with your health care provider.  Document Released: 01/13/2017 Document Revised: 05/25/2017 Document " Reviewed: 01/06/2016  [employer] Interactive Patient Education © 2019 [employer]

## 2019-09-19 ENCOUNTER — OFFICE VISIT (OUTPATIENT)
Dept: PAIN MEDICINE | Facility: CLINIC | Age: 44
End: 2019-09-19

## 2019-09-19 VITALS
TEMPERATURE: 98.6 F | HEIGHT: 67 IN | SYSTOLIC BLOOD PRESSURE: 120 MMHG | DIASTOLIC BLOOD PRESSURE: 68 MMHG | RESPIRATION RATE: 16 BRPM | BODY MASS INDEX: 30.45 KG/M2 | HEART RATE: 62 BPM | WEIGHT: 194 LBS | OXYGEN SATURATION: 98 %

## 2019-09-19 DIAGNOSIS — M51.36 DDD (DEGENERATIVE DISC DISEASE), LUMBAR: ICD-10-CM

## 2019-09-19 DIAGNOSIS — G89.4 CHRONIC PAIN SYNDROME: Primary | Chronic | ICD-10-CM

## 2019-09-19 DIAGNOSIS — M47.816 SPONDYLOSIS OF LUMBAR REGION WITHOUT MYELOPATHY OR RADICULOPATHY: ICD-10-CM

## 2019-09-19 PROCEDURE — 99213 OFFICE O/P EST LOW 20 MIN: CPT | Performed by: PHYSICAL MEDICINE & REHABILITATION

## 2019-09-19 NOTE — PROGRESS NOTES
"CHIEF COMPLAINT  Pt is here to f/u on back pain. Pt states her pain has increased.    Subjective HPI     Oksana Pillai is a 44 y.o. female  who presents to the office for follow-up. Patient is new to me.She has chronic back pain. She had a RFA bilateral L2-5 back in march 2019 with good results, but the pain is now returning. Patient reports she is still going to PT on Fridays and therapy helps. She had to quit her Yoga class due to scheduling. She has recently had a change in work that requires more sitting which tends to make the pain worse. Rest lying down, walking and heat provide some relief. She rates her pain at 6/10 today.     PEG Assessment   What number best describes your pain on average in the past week?7  What number best describes how, during the past week, pain has interfered with your enjoyment of life?4  What number best describes how, during the past week, pain has interfered with your general activity?  8      The following portions of the patient's history were reviewed and updated as appropriate: allergies, current medications, past family history, past medical history, past social history, past surgical history and problem list.    Review of Systems   Constitutional: Negative for fever.   HENT: Negative for congestion.    Respiratory: Negative for shortness of breath.    Gastrointestinal: Negative for abdominal pain.   Genitourinary: Negative for difficulty urinating.   Musculoskeletal: Positive for back pain. Negative for neck pain.   Neurological: Negative for dizziness.   Psychiatric/Behavioral: Negative for sleep disturbance.       Vitals:    09/19/19 0823   BP: 120/68   BP Location: Right arm   Patient Position: Sitting   Cuff Size: Large Adult   Pulse: 62   Resp: 16   Temp: 98.6 °F (37 °C)   SpO2: 98%   Weight: 88 kg (194 lb)   Height: 170 cm (66.93\")   PainSc:   4   PainLoc: Back         Objective   Physical Exam   Constitutional: She is oriented to person, place, and time. She appears " well-developed and well-nourished. No distress.   HENT:   Head: Normocephalic and atraumatic.   Eyes: EOM are normal.   PER   Cardiovascular: Normal rate, regular rhythm and normal heart sounds.   No murmur heard.  Pulmonary/Chest: Effort normal and breath sounds normal. No respiratory distress. She has no wheezes.   Abdominal: Soft. Bowel sounds are normal. There is no tenderness.   Musculoskeletal:   Low back TTP L2-L5. Full ROM with flexion and S to S bending. Decreased ROM with extension.   Neurological: She is alert and oriented to person, place, and time. She displays no atrophy.   Reflex Scores:       Patellar reflexes are 2+ on the right side and 2+ on the left side.       Achilles reflexes are 0 on the right side and 1+ on the left side.  MMT: Strength 5/5 BLE.  Sensation to LT decreased S1 distribution RLE   Skin: Skin is warm and dry.   Psychiatric: She has a normal mood and affect. Her behavior is normal.   Vitals reviewed.          Assessment/Plan   Oksana was seen today for back pain.    Diagnoses and all orders for this visit:    Chronic pain syndrome    DDD (degenerative disc disease), lumbar  -     Case Request    Spondylosis of lumbar region without myelopathy or radiculopathy  -     Case Request    Continue gabapentin as prescribed by Dr. Ojeda.  Schedule bilateral L2-5 Lumbar MBB x 2 one month apart.    --- Follow-up as scheduled for procedure.         BARBARA REPORT    As part of the patient's treatment plan, I am prescribing controlled substances. The patient has been made aware of appropriate use of such medications, including potential risk of somnolence, limited ability to drive and/or work safely, and the potential for dependence or overdose. It has also bee made clear that these medications are for use by this patient only, without concomitant use of alcohol or other substances unless prescribed.     Patient has completed prescribing agreement detailing terms of continued prescribing of  controlled substances, including monitoring BARBARA reports, urine drug screening, and pill counts if necessary. The patient is aware that inappropriate use will results in cessation of prescribing such medications.    BARBARA report has been reviewed and scanned into the patient's chart.    As the clinician, I personally reviewed the BARBARA from 9/16/2019 while the patient was in the office today.    History and physical exam exhibit continued safe and appropriate use of controlled substances.      EMR Dragon/Transcription disclaimer:   Much of this encounter note is an electronic transcription/translation of spoken language to printed text. The electronic translation of spoken language may permit erroneous, or at times, nonsensical words or phrases to be inadvertently transcribed; Although I have reviewed the note for such errors, some may still exist.

## 2019-09-24 ENCOUNTER — APPOINTMENT (OUTPATIENT)
Dept: WOMENS IMAGING | Facility: HOSPITAL | Age: 44
End: 2019-09-24

## 2019-09-24 PROCEDURE — 77067 SCR MAMMO BI INCL CAD: CPT | Performed by: RADIOLOGY

## 2019-10-01 NOTE — PROGRESS NOTES
Oksana Pillai is a 43 y.o. female, who presents with a chief complaint of   Chief Complaint   Patient presents with   • Follow-up     2 x month f/u, medication f/u    • Anxiety   • Depression       HPI   Pt doing well with increase in cymbalta.  She feels more level headed and able to keep her cool.  She doesn't think the med helped much with her back pain.  Pt sleeping better but still having some night sweats.  She has been on a strict diet and has lost about 10 pounds.      Low thyroid levels - last labs 6 mo ago. No hair/skin changes      The following portions of the patient's history were reviewed and updated as appropriate: allergies, current medications, past family history, past medical history, past social history, past surgical history and problem list.    Allergies: Patient has no known allergies.    Review of Systems   Constitutional: Negative.    HENT: Negative.    Eyes: Negative.    Respiratory: Negative.    Cardiovascular: Negative.    Gastrointestinal: Negative.    Endocrine: Negative.    Genitourinary: Negative.    Musculoskeletal: Negative.    Skin: Negative.    Allergic/Immunologic: Negative.    Neurological: Negative.    Hematological: Negative.    Psychiatric/Behavioral: Negative.    All other systems reviewed and are negative.            Wt Readings from Last 3 Encounters:   02/13/19 94.3 kg (208 lb)   01/25/19 98.9 kg (218 lb)   12/11/18 97.5 kg (215 lb)     Temp Readings from Last 3 Encounters:   02/13/19 97.8 °F (36.6 °C) (Oral)   01/25/19 97.4 °F (36.3 °C)   12/11/18 98.4 °F (36.9 °C) (Oral)     BP Readings from Last 3 Encounters:   02/13/19 118/64   01/25/19 114/78   12/11/18 116/80     Pulse Readings from Last 3 Encounters:   02/13/19 80   01/25/19 93   12/11/18 85     Body mass index is 32.58 kg/m².  @LASTSAO2(3)@    Physical Exam   Constitutional: She is oriented to person, place, and time. She appears well-developed and well-nourished. No distress.   HENT:   Head: Normocephalic and  Daily Note     Today's date: 10/1/2019  Patient name: Young Meeks  : 1952  MRN: [id number]  Referring provider: Bradly Bowen MD  Dx:   Encounter Diagnosis     ICD-10-CM    1  Chronic bilateral low back pain with bilateral sciatica M54 42     M54 41     G89 29    2  Adhesive capsulitis of right shoulder M75 01    3  Chronic right shoulder pain M25 511     G89 29                   Subjective: Pt reports MD follow up 10/10/19  Would like to carry PT until then as she continues to see benefit and improvements  Reports HEP continues to go well and that ultimate goal is to safely transition to HEP  Anxious to continue making progress  Objective: See treatment diary below      Assessment: Tolerated treatment well  Patient demonstrated fatigue post treatment, exhibited good technique with therapeutic exercises and would benefit from continued PT      Plan: Continue per plan of care  Progress treatment as tolerated         Precautions: None noted at this time    Daily Treatment Diary     HEP: Sheet provided and discussed    Manual  10/1/19            ART x15'            IASTM             JM Prone P to A grade I, II JM x5'            Stretch             Triggerpoint                 Exercise Diary  10/1/19            Prone on elbows x20'                         Prone Press Ups 3x10            Front Wall Glides             Lateral Wall Glides             LTR             SKTC             DKTC             PB fwd trunk flx             Prone hip ext 3x10 ea            Prone hip IR with TB 3x10 L2            Prone hip ER with Ball 3x10            Prone Hip flexor stretch              No Shoe AE Steamboats             Lateral walking              QKB 3x5            Cat Backs 2x10            On elbows hip ext                                           Modalities  10/1/19            MHP             CP Prone x10'            STIM atraumatic.   Right Ear: External ear normal.   Left Ear: External ear normal.   Nose: Nose normal.   Mouth/Throat: Oropharynx is clear and moist.   Eyes: Conjunctivae and EOM are normal. Pupils are equal, round, and reactive to light.   Neck: Normal range of motion. Neck supple.   Cardiovascular: Normal rate, regular rhythm, normal heart sounds and intact distal pulses.   Pulmonary/Chest: Effort normal and breath sounds normal. No respiratory distress. She has no wheezes.   Musculoskeletal: Normal range of motion.   Normal gait   Neurological: She is alert and oriented to person, place, and time.   Skin: Skin is warm and dry.   Psychiatric: She has a normal mood and affect. Her behavior is normal. Judgment and thought content normal.   Nursing note and vitals reviewed.      Results for orders placed or performed in visit on 12/10/18   POC Urine Drug Screen, Triage   Result Value Ref Range    Methamphetaine Screen, Urine Negative Negative    POC Amphetamines Negative Negative    Barbiturates Screen Negative Negative    Benzodiazepine Screen Negative Negative    Cocaine Screen Negative Negative    Methadone Screen Negative Negative    Opiate Screen Negative Negative    Oxycodone, Screen Negative Negative    Phencyclidine (PCP) Screen Negative Negative    Propoxyphene Screen Negative Negative    THC, Screen Negative Negative    Tricyclic Antidepressants Screen Negative Negative           Oksana was seen today for follow-up, anxiety and depression.    Diagnoses and all orders for this visit:    Anxiety and depression - cont duloxetine    Abnormal thyroid blood test  -     Comprehensive Metabolic Panel  -     T4, Free  -     TSH          Outpatient Medications Prior to Visit   Medication Sig Dispense Refill   • cyclobenzaprine (FLEXERIL) 10 MG tablet 1/2-1 po TID prn spasm 40 tablet 0   • DULoxetine (CYMBALTA) 60 MG capsule TAKE ONE CAPSULE BY MOUTH DAILY 90 capsule 1   • fexofenadine (ALLEGRA) 60 MG tablet Take 60 mg by  mouth Daily.     • gabapentin (NEURONTIN) 600 MG tablet TAKE TWO TABLETS BY MOUTH EVERY MORNING AND TAKE TWO TABLETS BY MOUTH EVERY EVENING 120 tablet 0   • ibuprofen (ADVIL,MOTRIN) 800 MG tablet Take 1 tablet by mouth Every 8 (Eight) Hours As Needed for Mild Pain . 90 tablet 1   • levonorgestrel (MIRENA) 20 MCG/24HR IUD 1 each by Intrauterine route 1 (One) Time.       No facility-administered medications prior to visit.      No orders of the defined types were placed in this encounter.    [unfilled]  There are no discontinued medications.      Return in about 6 months (around 8/13/2019) for Recheck, labs.

## 2019-10-29 ENCOUNTER — OUTSIDE FACILITY SERVICE (OUTPATIENT)
Dept: PAIN MEDICINE | Facility: CLINIC | Age: 44
End: 2019-10-29

## 2019-10-29 ENCOUNTER — DOCUMENTATION (OUTPATIENT)
Dept: PAIN MEDICINE | Facility: CLINIC | Age: 44
End: 2019-10-29

## 2019-10-29 DIAGNOSIS — M47.816 SPONDYLOSIS OF LUMBAR REGION WITHOUT MYELOPATHY OR RADICULOPATHY: Primary | ICD-10-CM

## 2019-10-29 PROCEDURE — 64495 INJ PARAVERT F JNT L/S 3 LEV: CPT | Performed by: ANESTHESIOLOGY

## 2019-10-29 PROCEDURE — 64493 INJ PARAVERT F JNT L/S 1 LEV: CPT | Performed by: ANESTHESIOLOGY

## 2019-10-29 PROCEDURE — 64494 INJ PARAVERT F JNT L/S 2 LEV: CPT | Performed by: ANESTHESIOLOGY

## 2019-10-30 NOTE — PROGRESS NOTES
Bilateral L2-5 Lumbar Medial Branch Blockade  [facility]    PREOPERATIVE DIAGNOSIS:  Lumbar spondylosis without myelopathy    POSTOPERATIVE DIAGNOSIS:  Lumbar spondylosis without myelopathy    PROCEDURE:   Diagnostic Bilateral Lumbar Medial Branch Nerve Blockades, with fluoroscopy:  L2, L3, L4, and L5 nerves (at the L3, L4, L5 transverse processes and the sacral alar groove) to block facet joints L3-4, L4-5, and L5-S1  1. 36033-51 -- Bilateral Lumbar Facet blocks, 1st Level  2. 16293-38 -- Bilateral Lumbar Facet blocks, 2nd  Level  3. 55355-55 -- Bilateral Lumbar Facet blocks, 3rd Level    PRE-PROCEDURE DISCUSSION WITH PATIENT:    Risks and complications were discussed with the patient prior to starting the procedure and informed consent was obtained.      SURGEON:  Iggy Bartlett MD    REASON FOR PROCEDURE:   The patient complains of pain that seems to have a significant axial component, Previous diagnostic positivity of a Lumbar Medial Branch Blockade at the same levels and Previous clinically significant therapeutic effect after prior Radiofrequency procedure    SEDATION:  Versed 6mg IV  ANESTHETIC:  Marcaine 0.5%  STEROID:  NONE  TOTAL VOLUME OF SOLUTION: 8ml    DESCRIPTON OF PROCEDURE:  After obtaining informed consent, IV access was obtained in the preoperative area.   The patient was taken to the operating room.  The patient was placed in the prone position with a pillow under the abdomen. All pressure points were well padded.  EKG, blood pressure, and pulse oximeter were monitored.  The patient was monitored and sedated by the RN under my direction. The lumbosacral area was prepped with Chloraprep and draped in a sterile fashion. Under fluoroscopic guidance the transverse processes of the L3, L4, and L5 vertebrae at the junctions of the superior articular processes were identified on the right. Also identified was the groove between the ala and the superior articular process of the  sacrum on the ipsilateral side.  Skin and subcutaneous tissue were anesthetized with 1% lidocaine above each of these points. A 22-gauge spinal needle was introduced under fluoroscopic guidance at the above junctions. Aspiration was negative for blood and CSF.  After confirming the position of the needle with fluoroscope in all views, 0.25 mL of Omnipaque was injected, and after seeing the proper spread a total of 1 mL of the anesthetic solution noted above was injected at each of these points.  Needles were removed intact from each of the areas.  A similar procedure was repeated to block the L2, L3, L4, and L5 nerves on the contralateral side.   Onset of analgesia was noted.  Vital signs remained stable throughout.      ESTIMATED BLOOD LOSS:  <5 mL  SPECIMENS:  none    COMPLICATIONS:   No complications were noted., There was no indication of vascular uptake on live injection of contrast dye. and The patient did not have any signs of postprocedure numbness nor weakness.    TOLERANCE & DISCHARGE CONDITION:    The patient tolerated the procedure well.  The patient was transported to the recovery area without difficulties.  The patient was discharged to home under the care of family in stable and satisfactory condition.    PLAN OF CARE:  1. The patient was given our standard instruction sheet.  2. We discussed that Lumbar Medial Branch Blockade is a diagnostic procedure in consideration for radiofrequency ablation if two diagnostic procedures prove to be positive for significant benefit.  If sustained relief of 6 to eight weeks is obtained, then an alternative plan could be therapeutic lumbar branch blockades.  3. The patient is asked to keep a pain log each hour for 8 hours after the procedure today.  4. The patient will  be examined in the PACU to assess diagnostic effect., The examination in the PACU revealed a diagnostically positive effect with >80% pain relief. and Plan for Radiofrequency procedure of the same  levels as we have noted significant diagnostic effect on exam after the procedure.  5. The patient will resume all medications as per the medication reconciliation sheet.

## 2019-11-12 ENCOUNTER — OFFICE VISIT (OUTPATIENT)
Dept: INTERNAL MEDICINE | Facility: CLINIC | Age: 44
End: 2019-11-12

## 2019-11-12 VITALS
HEART RATE: 69 BPM | DIASTOLIC BLOOD PRESSURE: 80 MMHG | HEIGHT: 67 IN | BODY MASS INDEX: 31.08 KG/M2 | RESPIRATION RATE: 16 BRPM | SYSTOLIC BLOOD PRESSURE: 126 MMHG | WEIGHT: 198 LBS | OXYGEN SATURATION: 98 % | TEMPERATURE: 99.1 F

## 2019-11-12 DIAGNOSIS — Z23 FLU VACCINE NEED: ICD-10-CM

## 2019-11-12 DIAGNOSIS — F41.9 ANXIETY AND DEPRESSION: Primary | ICD-10-CM

## 2019-11-12 DIAGNOSIS — F32.A ANXIETY AND DEPRESSION: Primary | ICD-10-CM

## 2019-11-12 DIAGNOSIS — R41.840 INATTENTION: ICD-10-CM

## 2019-11-12 PROCEDURE — 90471 IMMUNIZATION ADMIN: CPT | Performed by: INTERNAL MEDICINE

## 2019-11-12 PROCEDURE — 99214 OFFICE O/P EST MOD 30 MIN: CPT | Performed by: INTERNAL MEDICINE

## 2019-11-12 PROCEDURE — 90674 CCIIV4 VAC NO PRSV 0.5 ML IM: CPT | Performed by: INTERNAL MEDICINE

## 2019-11-12 RX ORDER — BUSPIRONE HYDROCHLORIDE 5 MG/1
5 TABLET ORAL 2 TIMES DAILY PRN
Qty: 60 TABLET | Refills: 0 | Status: SHIPPED | OUTPATIENT
Start: 2019-11-12 | End: 2019-12-03

## 2019-11-12 RX ORDER — DESVENLAFAXINE SUCCINATE 50 MG/1
50 TABLET, EXTENDED RELEASE ORAL DAILY
Qty: 30 TABLET | Refills: 3 | Status: SHIPPED | OUTPATIENT
Start: 2019-11-12 | End: 2019-12-03

## 2019-11-12 NOTE — PROGRESS NOTES
"      Oksana Pillai is a 44 y.o. female, who presents with a chief complaint of   Chief Complaint   Patient presents with   • Follow-up   • Medication Problem       43 yo F here for follow up. She has not been feeling well for the last week. Her brain has felt scattered. She is having hard time executing plans. Difficulty focusing. 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 She has been forgetful. She doesn't \"feel normal\". She has been on Cymbalta since 11/2018. Has tried Zoloft in the past that did not help as well as Effexor. She does see Felicia Rodriguez in [place]. No SI/HI. Feeling very emotional.          The following portions of the patient's history were reviewed and updated as appropriate: allergies, current medications, past family history, past medical history, past social history, past surgical history and problem list.    Allergies: Patient has no known allergies.    Current Outpatient Medications:   •  busPIRone (BUSPAR) 5 MG tablet, Take 1 tablet by mouth 2 (Two) Times a Day As Needed (anxiety)., Disp: 60 tablet, Rfl: 0  •  cyclobenzaprine (FLEXERIL) 10 MG tablet, 1/2-1 po TID prn spasm, Disp: 40 tablet, Rfl: 0  •  desvenlafaxine (PRISTIQ) 50 MG 24 hr tablet, Take 1 tablet by mouth Daily., Disp: 30 tablet, Rfl: 3  •  fexofenadine (ALLEGRA) 60 MG tablet, Take 60 mg by mouth Daily., Disp: , Rfl:   •  fluticasone (FLONASE) 50 MCG/ACT nasal spray, 2 sprays into the nostril(s) as directed by provider Daily., Disp: , Rfl:   •  gabapentin (NEURONTIN) 600 MG tablet, Take 600 mg by mouth 3 (Three) Times a Day. 1/2 tablet  In am, Disp: , Rfl:   •  levonorgestrel (MIRENA) 20 MCG/24HR IUD, 1 each by Intrauterine route 1 (One) Time., Disp: , Rfl:   Medications Discontinued During This Encounter   Medication Reason   • DULoxetine (CYMBALTA) 60 MG capsule *Therapy completed       Review of Systems   Constitutional: Positive for fatigue. Negative for chills.   HENT: Negative for congestion.    Respiratory: Negative for " "cough and shortness of breath.    Skin: Negative for rash.   Neurological: Positive for headaches (occipital headache x 2 that resolved on own ). Negative for dizziness.   Psychiatric/Behavioral: Positive for decreased concentration and dysphoric mood.             /80 (BP Location: Left arm, Patient Position: Sitting, Cuff Size: Large Adult)   Pulse 69   Temp 99.1 °F (37.3 °C) (Oral)   Resp 16   Ht 170 cm (66.93\")   Wt 89.8 kg (198 lb)   SpO2 98%   BMI 31.08 kg/m²       Physical Exam   Constitutional: She is oriented to person, place, and time. She appears well-developed and well-nourished. No distress.   HENT:   Head: Normocephalic and atraumatic.   Right Ear: External ear normal.   Left Ear: External ear normal.   Mouth/Throat: Oropharynx is clear and moist. No oropharyngeal exudate.   Eyes: Conjunctivae are normal. Right eye exhibits no discharge. Left eye exhibits no discharge. No scleral icterus.   Neck: Neck supple.   Cardiovascular: Normal rate, regular rhythm and normal heart sounds. Exam reveals no gallop and no friction rub.   No murmur heard.  Pulmonary/Chest: Effort normal and breath sounds normal. No respiratory distress. She has no wheezes. She has no rales.   Lymphadenopathy:     She has no cervical adenopathy.   Neurological: She is alert and oriented to person, place, and time.   Skin: Skin is warm. No rash noted.   Psychiatric: Her behavior is normal. Her mood appears anxious. Her affect is labile. She exhibits a depressed mood.   Nursing note and vitals reviewed.          Results for orders placed or performed in visit on 08/07/19   Comprehensive metabolic panel   Result Value Ref Range    Glucose 95 65 - 99 mg/dL    BUN 13 6 - 20 mg/dL    Creatinine 1.02 (H) 0.57 - 1.00 mg/dL    eGFR Non African Am 60 (L) >60 mL/min/1.73    eGFR African Am 72 >60 mL/min/1.73    BUN/Creatinine Ratio 12.7 7.0 - 25.0    Sodium 142 136 - 145 mmol/L    Potassium 4.4 3.5 - 5.2 mmol/L    Chloride 102 98 - " 107 mmol/L    Total CO2 27.8 22.0 - 29.0 mmol/L    Calcium 9.4 8.6 - 10.5 mg/dL    Total Protein 7.3 6.0 - 8.5 g/dL    Albumin 4.10 3.50 - 5.20 g/dL    Globulin 3.2 gm/dL    A/G Ratio 1.3 g/dL    Total Bilirubin 0.7 0.2 - 1.2 mg/dL    Alkaline Phosphatase 110 39 - 117 U/L    AST (SGOT) 27 1 - 32 U/L    ALT (SGPT) 25 1 - 33 U/L   TSH   Result Value Ref Range    TSH 1.420 0.270 - 4.200 mIU/mL   T4, free   Result Value Ref Range    Free T4 0.81 (L) 0.93 - 1.70 ng/dL   Lipid Panel With LDL / HDL Ratio   Result Value Ref Range    Total Cholesterol 181 0 - 200 mg/dL    Triglycerides 82 0 - 150 mg/dL    HDL Cholesterol 45 40 - 60 mg/dL    VLDL Cholesterol 16.4 mg/dL    LDL Cholesterol  120 (H) 0 - 100 mg/dL    LDL/HDL Ratio 2.66    Manual Differential   Result Value Ref Range    Neutrophil Rel % 31.3 (L) 42.7 - 76.0 %    Lymphocyte Rel % 60.6 (H) 19.6 - 45.3 %    Monocyte Rel % 3.0 (L) 5.0 - 12.0 %    Eosinophil Rel % 5.1 0.3 - 6.2 %    Neutrophils Absolute 1.46 (L) 1.70 - 7.00 10*3/mm3    Lymphocytes Absolute 2.84 0.70 - 3.10 10*3/mm3    Monocytes Absolute 0.14 0.10 - 0.90 10*3/mm3    Eosinophil Abs 0.24 0.00 - 0.40 10*3/mm3    Differential Comment Comment     Comment Comment     Plt Comment Comment    CBC & Differential   Result Value Ref Range    WBC 4.68 3.40 - 10.80 10*3/mm3    RBC 4.63 3.77 - 5.28 10*6/mm3    Hemoglobin 14.5 12.0 - 15.9 g/dL    Hematocrit 45.3 34.0 - 46.6 %    MCV 97.8 (H) 79.0 - 97.0 fL    MCH 31.3 26.6 - 33.0 pg    MCHC 32.0 31.5 - 35.7 g/dL    RDW 12.6 12.3 - 15.4 %    Platelets 182 140 - 450 10*3/mm3    Neutrophil Rel % CANCELED     Lymphocyte Rel % CANCELED     Monocyte Rel % CANCELED     Eosinophil Rel % CANCELED     Lymphocytes Absolute CANCELED     Eosinophils Absolute CANCELED     Basophils Absolute CANCELED            Oksana was seen today for follow-up and medication problem.    Diagnoses and all orders for this visit:    Anxiety and depression    Inattention    Flu vaccine need  -      Flucelvax Quad=>4Years (PFS)    Other orders  -     desvenlafaxine (PRISTIQ) 50 MG 24 hr tablet; Take 1 tablet by mouth Daily.  -     busPIRone (BUSPAR) 5 MG tablet; Take 1 tablet by mouth 2 (Two) Times a Day As Needed (anxiety).      Change to Pristiq and see back in 4 weeks   If continues to have headaches, will check MRI of her brain   If continues to have some inattention, consider testing for ADHD   Her anxiety is not under good control   Buspar as needed    Consider seeing therapist that her son sees Kenneth Figueredo   Spent greater than 50% in 25 minutes in counseling regarding medication and control of anxiety     Return in about 4 weeks (around 12/10/2019) for Recheck.    Sona Orellana MD  11/12/2019

## 2019-12-03 ENCOUNTER — OFFICE VISIT (OUTPATIENT)
Dept: INTERNAL MEDICINE | Facility: CLINIC | Age: 44
End: 2019-12-03

## 2019-12-03 VITALS
BODY MASS INDEX: 31.23 KG/M2 | SYSTOLIC BLOOD PRESSURE: 114 MMHG | TEMPERATURE: 98.3 F | HEIGHT: 67 IN | HEART RATE: 68 BPM | OXYGEN SATURATION: 98 % | WEIGHT: 199 LBS | RESPIRATION RATE: 14 BRPM | DIASTOLIC BLOOD PRESSURE: 76 MMHG

## 2019-12-03 DIAGNOSIS — F41.9 ANXIETY: ICD-10-CM

## 2019-12-03 DIAGNOSIS — F30.9 MANIA (HCC): Primary | ICD-10-CM

## 2019-12-03 DIAGNOSIS — F32.A DEPRESSION, UNSPECIFIED DEPRESSION TYPE: ICD-10-CM

## 2019-12-03 PROCEDURE — 99214 OFFICE O/P EST MOD 30 MIN: CPT | Performed by: INTERNAL MEDICINE

## 2019-12-03 NOTE — PROGRESS NOTES
Oksana Pillai is a 44 y.o. female, who presents with a chief complaint of   Chief Complaint   Patient presents with   • Follow-up     1 x month f/u, med check, pt states she has x issues   • Anxiety   • Depression       45 yo F here with continued issues with her mood, nicole, impulsivity, and thoughts that she is struggling. She doesn't want to hurt her herself. She is with her  today. Has an appointment with Dr. Sona Lopez at [facility] in January.     Started Pristiq on 11/12 and feels that it has not helped her.          The following portions of the patient's history were reviewed and updated as appropriate: allergies, current medications, past family history, past medical history, past social history, past surgical history and problem list.    Allergies: Patient has no known allergies.    Current Outpatient Medications:   •  cyclobenzaprine (FLEXERIL) 10 MG tablet, 1/2-1 po TID prn spasm, Disp: 40 tablet, Rfl: 0  •  fexofenadine (ALLEGRA) 60 MG tablet, Take 60 mg by mouth Daily., Disp: , Rfl:   •  fluticasone (FLONASE) 50 MCG/ACT nasal spray, 2 sprays into the nostril(s) as directed by provider Daily., Disp: , Rfl:   •  gabapentin (NEURONTIN) 600 MG tablet, Take 600 mg by mouth 3 (Three) Times a Day. 1/2 tablet  In am, Disp: , Rfl:   •  levonorgestrel (MIRENA) 20 MCG/24HR IUD, 1 each by Intrauterine route 1 (One) Time., Disp: , Rfl:   Medications Discontinued During This Encounter   Medication Reason   • desvenlafaxine (PRISTIQ) 50 MG 24 hr tablet *Therapy completed   • busPIRone (BUSPAR) 5 MG tablet *Therapy completed       Review of Systems   Respiratory: Negative for cough and shortness of breath.    Gastrointestinal: Negative for diarrhea, nausea and vomiting.   Skin: Negative for rash.   Psychiatric/Behavioral: Positive for behavioral problems, confusion, decreased concentration and dysphoric mood. Negative for hallucinations and sleep disturbance. The patient is nervous/anxious.              BP  "114/76 (BP Location: Left arm, Patient Position: Sitting, Cuff Size: Large Adult)   Pulse 68   Temp 98.3 °F (36.8 °C) (Oral)   Resp 14   Ht 170 cm (66.93\")   Wt 90.3 kg (199 lb)   SpO2 98%   BMI 31.23 kg/m²       Physical Exam   Constitutional: She is oriented to person, place, and time. She appears well-developed and well-nourished. No distress.   HENT:   Head: Normocephalic and atraumatic.   Right Ear: External ear normal.   Left Ear: External ear normal.   Mouth/Throat: Oropharynx is clear and moist. No oropharyngeal exudate.   Eyes: Conjunctivae are normal. Right eye exhibits no discharge. Left eye exhibits no discharge. No scleral icterus.   Neck: Neck supple.   Pulmonary/Chest: Effort normal.   Abdominal: She exhibits no mass.   Lymphadenopathy:     She has no cervical adenopathy.   Neurological: She is alert and oriented to person, place, and time.   Skin: Skin is warm. No rash noted.   Psychiatric: Her behavior is normal. Her mood appears anxious. Her affect is labile. Her speech is delayed and tangential. Thought content is not paranoid and not delusional. She exhibits a depressed mood. She expresses no homicidal ideation. She expresses no suicidal plans and no homicidal plans.   Nursing note and vitals reviewed.          Results for orders placed or performed in visit on 08/07/19   Comprehensive metabolic panel   Result Value Ref Range    Glucose 95 65 - 99 mg/dL    BUN 13 6 - 20 mg/dL    Creatinine 1.02 (H) 0.57 - 1.00 mg/dL    eGFR Non African Am 60 (L) >60 mL/min/1.73    eGFR African Am 72 >60 mL/min/1.73    BUN/Creatinine Ratio 12.7 7.0 - 25.0    Sodium 142 136 - 145 mmol/L    Potassium 4.4 3.5 - 5.2 mmol/L    Chloride 102 98 - 107 mmol/L    Total CO2 27.8 22.0 - 29.0 mmol/L    Calcium 9.4 8.6 - 10.5 mg/dL    Total Protein 7.3 6.0 - 8.5 g/dL    Albumin 4.10 3.50 - 5.20 g/dL    Globulin 3.2 gm/dL    A/G Ratio 1.3 g/dL    Total Bilirubin 0.7 0.2 - 1.2 mg/dL    Alkaline Phosphatase 110 39 - 117 U/L "    AST (SGOT) 27 1 - 32 U/L    ALT (SGPT) 25 1 - 33 U/L   TSH   Result Value Ref Range    TSH 1.420 0.270 - 4.200 mIU/mL   T4, free   Result Value Ref Range    Free T4 0.81 (L) 0.93 - 1.70 ng/dL   Lipid Panel With LDL / HDL Ratio   Result Value Ref Range    Total Cholesterol 181 0 - 200 mg/dL    Triglycerides 82 0 - 150 mg/dL    HDL Cholesterol 45 40 - 60 mg/dL    VLDL Cholesterol 16.4 mg/dL    LDL Cholesterol  120 (H) 0 - 100 mg/dL    LDL/HDL Ratio 2.66    Manual Differential   Result Value Ref Range    Neutrophil Rel % 31.3 (L) 42.7 - 76.0 %    Lymphocyte Rel % 60.6 (H) 19.6 - 45.3 %    Monocyte Rel % 3.0 (L) 5.0 - 12.0 %    Eosinophil Rel % 5.1 0.3 - 6.2 %    Neutrophils Absolute 1.46 (L) 1.70 - 7.00 10*3/mm3    Lymphocytes Absolute 2.84 0.70 - 3.10 10*3/mm3    Monocytes Absolute 0.14 0.10 - 0.90 10*3/mm3    Eosinophil Abs 0.24 0.00 - 0.40 10*3/mm3    Differential Comment Comment     Comment Comment     Plt Comment Comment    CBC & Differential   Result Value Ref Range    WBC 4.68 3.40 - 10.80 10*3/mm3    RBC 4.63 3.77 - 5.28 10*6/mm3    Hemoglobin 14.5 12.0 - 15.9 g/dL    Hematocrit 45.3 34.0 - 46.6 %    MCV 97.8 (H) 79.0 - 97.0 fL    MCH 31.3 26.6 - 33.0 pg    MCHC 32.0 31.5 - 35.7 g/dL    RDW 12.6 12.3 - 15.4 %    Platelets 182 140 - 450 10*3/mm3    Neutrophil Rel % CANCELED     Lymphocyte Rel % CANCELED     Monocyte Rel % CANCELED     Eosinophil Rel % CANCELED     Lymphocytes Absolute CANCELED     Eosinophils Absolute CANCELED     Basophils Absolute CANCELED            Oksana was seen today for follow-up, anxiety and depression.    Diagnoses and all orders for this visit:    Nicole (CMS/[facility])    Depression, unspecified depression type    Anxiety      I think that this is now more nicole/depression and possible Bipolar   She is unstable today   I think that she needs to do an intensive outpatient program or possibly inpatient  Discussed going to [facility], [facility] or [facility]   Gave information and discussed with   about calling today to discuss options   Does not want to be on treatment currently and wants to wait to see psychiatrist    Return in about 1 month (around 1/3/2020) for Recheck.    Sona Orellana MD  12/03/2019

## 2019-12-04 ENCOUNTER — TELEPHONE (OUTPATIENT)
Dept: INTERNAL MEDICINE | Facility: CLINIC | Age: 44
End: 2019-12-04

## 2019-12-04 NOTE — TELEPHONE ENCOUNTER
Yes, it is possible. There have been case reports of this occurring. If she feels there was a direct correlation last time, I would have her talk to the anesthesiologists on her case for the ablation about other options before having this done. Whether she can do conscious sedation or just local? I honestly don’t know if they can for that procedure. Hope this helps.

## 2019-12-04 NOTE — TELEPHONE ENCOUNTER
Pt asking if sedation from a procedure x months ago could have caused current situation discussed at ov yesterday. Pt has an ablation scheduled in the next day or so and is worried if this could trigger more sx. Pt would like to know what your thoughts are. Please advise.

## 2019-12-04 NOTE — TELEPHONE ENCOUNTER
Pt advised and voiced understanding. Pt states she will reach out to office doing procedure and see what they think. Pt will call me back if Dr. Orellana needs to speak directly to MD or anesthesiologist.

## 2019-12-05 ENCOUNTER — OUTSIDE FACILITY SERVICE (OUTPATIENT)
Dept: PAIN MEDICINE | Facility: CLINIC | Age: 44
End: 2019-12-05

## 2019-12-05 ENCOUNTER — DOCUMENTATION (OUTPATIENT)
Dept: PAIN MEDICINE | Facility: CLINIC | Age: 44
End: 2019-12-05

## 2019-12-05 PROCEDURE — 64635 DESTROY LUMB/SAC FACET JNT: CPT | Performed by: ANESTHESIOLOGY

## 2019-12-05 PROCEDURE — 64636 DESTROY L/S FACET JNT ADDL: CPT | Performed by: ANESTHESIOLOGY

## 2019-12-05 PROCEDURE — 99152 MOD SED SAME PHYS/QHP 5/>YRS: CPT | Performed by: ANESTHESIOLOGY

## 2019-12-09 RX ORDER — BUSPIRONE HYDROCHLORIDE 5 MG/1
TABLET ORAL
Qty: 60 TABLET | Refills: 0 | Status: SHIPPED | OUTPATIENT
Start: 2019-12-09 | End: 2020-09-24

## 2019-12-09 NOTE — PROGRESS NOTES
Bilateral L2-5 Lumbar Medial Branch RADIOFREQUENCY  [facility]      PREOPERATIVE DIAGNOSIS:  Lumbar spondylosis without myelopathy    POSTOPERATIVE DIAGNOSIS:  Lumbar spondylosis without myelopathy    PROCEDURE:   Diagnostic Bilateral Lumbar Medial Branch Nerve thermal radiofrequency lesioning, with fluoroscopy:  L2, L3, L4, and L5 nerves (at the L3, L4, L5 transverse processes and the sacral alar groove) to thermally treat the innervation to facet joints L3-4, L4-5, and L5-S1  1. 69560-18 -- Bilateral L/S facet neuro destr., 1st Level  2. 21831-01 -- Bilateral L/S facet neuro destr., 2nd  Level  3. 17591-26 -- Bilateral  L/S facet neuro destr., 3rd Level    PRE-PROCEDURE DISCUSSION WITH PATIENT:    Risks and complications were discussed with the patient prior to starting the procedure and informed consent was obtained.      SURGEON:  Iggy Bartlett MD    REASON FOR PROCEDURE:    The patient complains of pain that seems to have a significant axial component. Previous clinically significant therapeutic effect after prior Radiofrequency procedure.    SEDATION:  Fentanyl 150 mcg IV and Versed 2mg IV  TIME OF PROCEDURE:   The intraoperative procedure time after administration of the sedative was 32 minutes.       ANESTHETIC:  Lidocaine 2%  STEROID:  NONE      DESCRIPTON OF PROCEDURE:  After obtaining informed consent, IV access was obtained in the preoperative area.   The patient was taken to the operating room.  The patient was placed in the prone position with a pillow under the abdomen. All pressure points were well padded.  EKG, blood pressure, and pulse oximeter were monitored.  The patient was monitored and sedated by the RN under my direction. The lumbosacral area was prepped with Chloraprep and draped in a sterile fashion.     Under fluoroscopic guidance the transverse processes of the L3, L4, and L5 vertebrae at the junctions of the superior articular processes were identified on the  right.  Also identified was the groove between the ala and the superior articular process of the sacrum on the ipsilateral side.  Skin and subcutaneous tissue were anesthetized with 1ml of 1% lidocaine above each of these points. Then, radiofrequency probe needles were advanced in this fluoro view to the above junctions.  Aspiration was negative for blood and CSF.  After confirming the position of the needle with fluoroscope in all views, testing was initiated.  First, sensory testing was started on each needle a 1V and 50Hz and slowly decreased until painful pressure stimulation diminished at 0.5V.  Next, motor testing was confirmed to be negative at 3V and 2Hz for any radicular stimulation.  Then 1mL of the local anesthetic was instilled in each needle.  Two minutes elapsed, and during this time a lateral fluoroscopic view was confirmed again to ensure the needles had not advanced nor retracted.  Then, Radiofrequency Lesioning was initiated for 2.5 minutes at 85 degrees Celsius.  Needles were removed intact from each of the areas.     A similar procedure was repeated to address the L2, L3, L4, and L5 nerves on the contralateral side.   Onset of analgesia was noted.  Vital signs remained stable throughout.      ESTIMATED BLOOD LOSS:  <5 mL  SPECIMENS:  none    COMPLICATIONS:   No complications were noted. and The patient did not have any signs of postprocedure numbness nor weakness.    TOLERANCE & DISCHARGE CONDITION:    The patient tolerated the procedure well.  The patient was transported to the recovery area without difficulties.  The patient was discharged to home under the care of family in stable and satisfactory condition.    PLAN OF CARE:  1. The patient was given our standard instruction sheet.  2. The patient will  Plan for follow up in clinic in 6 months or sooner PRN.  3. The patient will resume all medications as per the medication reconciliation sheet.    \

## 2019-12-22 NOTE — PROGRESS NOTES
Subjective   Patient ID: Oksana Pillai is a 41 y.o. female is here today for follow-up. She is 7 weeks out from right L5-S1 metrx discectomy with Dr. Ojeda on 4/20/17. She has been doing well. She still has some tingling in her right foot, but states that it has gotten better. She complains more bitterly of persistent jermaine horses in the R calf. She has been going to PT and states that she has more mobility. She is currently taking Gabapentin 100 mg BID and Flexeril 10 mg PRN for pain.    History of Present Illness        Review of Systems   Constitutional: Positive for activity change.   Musculoskeletal: Positive for arthralgias.   Neurological: Positive for numbness.   All other systems reviewed and are negative.      Objective   Physical Exam   Constitutional: She appears well-developed. No distress.   Musculoskeletal: She exhibits tenderness (Mild tenderness with palpation in the posterior lateral aspect of the right knee.  No temperature difference in the lower extremities, no swelling.).   Neurological:   Continues to have difficulty with heel walking on the right.  Positive straight leg raise on the right at 30°   Skin: Skin is warm and dry.   Incision is well approximated. No redness, swelling or drainage.    Psychiatric: She has a normal mood and affect.   Vitals reviewed.    Neurologic Exam    Assessment/Plan     Medical Decision Making:      Ms. Pillai is now 7 weeks out from surgery.  The pain is getting better however she is still having issues with severe cramping in the right upper calf.  She has difficulty with walking long distances due to the cramping.    I did not notice any calf swelling or temperature change in the lower extremities.  To be on the safe side, I will order a Doppler of the lower extremities to rule out clot.  Walking was encouraged.  She will continue with physical therapy and gabapentin.  She will call us if she has any worsening symptoms in the meantime.    Oksana was seen  today for post-op.    Diagnoses and all orders for this visit:    Follow-up examination following surgery  -     Duplex Venous Lower Extremity - Bilateral CAR; Future  -     Ambulatory Referral to Physical Therapy Evaluate and treat (continued therapy for R leg strengthening and intro to HEP)      Return in about 1 month (around 7/6/2017).               Body mass index is 32.42 kg/(m^2).     Scribe Attestation (For Scribes USE Only)...

## 2020-02-10 ENCOUNTER — TELEPHONE (OUTPATIENT)
Dept: NEUROSURGERY | Facility: CLINIC | Age: 45
End: 2020-02-10

## 2020-02-10 NOTE — TELEPHONE ENCOUNTER
Try half a capsule every other day for 2 weeks then every third day for 2 weeks and then stop if she is feeling okay

## 2020-02-10 NOTE — TELEPHONE ENCOUNTER
She was last seen in June 2019 and is to return in March  She is currently taking Gabapentin 600 mg (half) qd    Please advise

## 2020-03-19 ENCOUNTER — TELEPHONE (OUTPATIENT)
Dept: NEUROSURGERY | Facility: CLINIC | Age: 45
End: 2020-03-19

## 2020-03-19 NOTE — TELEPHONE ENCOUNTER
I left Ms. Pillai a message letting her know Dr. Ojeda has requested we reschedule her appointment and that it will need to be after May 18th.

## 2020-06-09 NOTE — PROGRESS NOTES
Subjective   History of Present Illness: Oksana Pillai is a 44 y.o. female for televisit follow up.    Patient was last seen 6.24.19 for low back pain and right leg weakness and N/T right foot/toes, patient was given weaning instructions for Gabapentin.  Pt has weaned off Gabapentin. Pt is still going to physical therapy.  She c/o back pain pain with R leg weakness as well as N/T in her feet/toes.    You have chosen to receive care through a telephone visit. Do you consent to use a telephone visit for your medical care today? Yes    This was a TeleVisit from my office. The patient was at home; it lasted 12 minutes. She is over 3 years out from a right L5-S1 microdiskectomy. She started off with severe calf weakness and even a footdrop and severe leg pain, which took a while even after surgery to get better. She weaned herself off of her gabapentin and can live with the residual symptoms which include numbness and tingling. She is very functional. She is not working at this point. She wanted to know what other therapy approach she could use and I suggested she look into Pilates which I think would be helpful for core strengthening and flexibility. She is aware of the risk of recurrence and if that happens she can certainly call us again.       Back Pain   This is a recurrent problem. The current episode started more than 1 year ago. The problem occurs intermittently. The problem has been gradually improving since onset. The pain is present in the lumbar spine. The quality of the pain is described as aching. The pain radiates to the right thigh. The pain is at a severity of 6/10. The pain is moderate. The pain is worse during the day. The symptoms are aggravated by sitting and lying down. Associated symptoms include numbness (R leg) and weakness (feet/toes). Pertinent negatives include no bladder incontinence or bowel incontinence. She has tried ice and heat for the symptoms. The treatment provided mild relief.       The  following portions of the patient's history were reviewed and updated as appropriate: allergies, current medications, past family history, past medical history, past social history, past surgical history and problem list.    Review of Systems   Gastrointestinal: Negative for bowel incontinence.   Genitourinary: Negative for bladder incontinence.   Musculoskeletal: Positive for back pain.   Neurological: Positive for weakness (feet/toes) and numbness (R leg).   Psychiatric/Behavioral: Positive for sleep disturbance.       Objective             Physical Exam   Deferred  Neurologic Exam   Deferred        Assessment/Plan   Independent Review of Radiographic Studies:      I personally reviewed the images from the following studies.    The postoperative MRI done in July 2017 showed no evidence of recurrent herniated disc on the right at L5-S1.  Quite a bit of facet disease however.  Agree with the report.    Medical Decision Making:      At this point, we can leave it open-ended.  If she has recurrence of leg pain in the of the right of the left and she can come back and see me for further evaluation.  She will look into trying some Pilates.      Oksana was seen today for back pain.    Diagnoses and all orders for this visit:    DDD (degenerative disc disease), lumbar    History of spinal surgery      Return if symptoms worsen or fail to improve.

## 2020-06-15 ENCOUNTER — OFFICE VISIT (OUTPATIENT)
Dept: NEUROSURGERY | Facility: CLINIC | Age: 45
End: 2020-06-15

## 2020-06-15 DIAGNOSIS — Z98.890 HISTORY OF SPINAL SURGERY: ICD-10-CM

## 2020-06-15 DIAGNOSIS — M51.36 DDD (DEGENERATIVE DISC DISEASE), LUMBAR: Primary | ICD-10-CM

## 2020-06-15 PROCEDURE — 99441 PR PHYS/QHP TELEPHONE EVALUATION 5-10 MIN: CPT | Performed by: NEUROLOGICAL SURGERY

## 2020-06-15 RX ORDER — DULOXETIN HYDROCHLORIDE 60 MG/1
CAPSULE, DELAYED RELEASE ORAL
COMMUNITY
Start: 2020-05-16 | End: 2020-09-24 | Stop reason: SDUPTHER

## 2020-06-15 RX ORDER — FLUCONAZOLE 200 MG/1
TABLET ORAL
COMMUNITY
Start: 2020-06-04 | End: 2020-09-24

## 2020-09-15 RX ORDER — DULOXETIN HYDROCHLORIDE 60 MG/1
CAPSULE, DELAYED RELEASE ORAL
Qty: 30 CAPSULE | Refills: 2 | OUTPATIENT
Start: 2020-09-15

## 2020-09-23 ENCOUNTER — TELEPHONE (OUTPATIENT)
Dept: INTERNAL MEDICINE | Facility: CLINIC | Age: 45
End: 2020-09-23

## 2020-09-23 RX ORDER — DULOXETIN HYDROCHLORIDE 60 MG/1
CAPSULE, DELAYED RELEASE ORAL
Status: CANCELLED | OUTPATIENT
Start: 2020-09-23

## 2020-09-23 NOTE — TELEPHONE ENCOUNTER
"    Caller: Oksana Pillai    Relationship: Self    Best call back number: [phone]*    Medication needed: DULOXETINE (CYMBALTA) 60 MG CAPSULE    When do you need the refill by: ASAP    What details did the patient provide when requesting the medication: PATIENT COMPLETELY OUT OF THIS MEDICATION    What is the patient's preferred pharmacy:    [facility] [address] [facility] - [phone]  - [phone] FX         PATIENT HAS APPT SCHEDULED WITH JORDIN ROMERO ON 9/24/20, BUT MAY HAVE TO BE RESCHEDULE DUE TO PATIENT'S CHILDREN HAVING \"COLDS\", SHE ADVISED WHEN DOING COVID SCREENING. A MESSAGE HAS BEEN SENT TO THE  POOL.  "

## 2020-09-23 NOTE — TELEPHONE ENCOUNTER
"PATIENT HAS BEEN SCHEDULED FOR NEW PATIENT APPT WITH JORDIN ROMERO, FOR 9/23. DURING COVID SCREENING THE PATIENT ADVISED THAT HER CHILDREN ALL HAVE \"COLD\". I WENT AHEAD AND LOCKED IN THE APPT. I DID ADVISE THE PATIENT THAT SHE MAY BE CONTACTED TO RESCHEDULE DUE TO HER CHILDREN HAVING \"COLDS\".    PATIENT'S CALLBACK: [phone]  "

## 2020-09-24 ENCOUNTER — OFFICE VISIT (OUTPATIENT)
Dept: INTERNAL MEDICINE | Facility: CLINIC | Age: 45
End: 2020-09-24

## 2020-09-24 VITALS
HEIGHT: 67 IN | BODY MASS INDEX: 32.49 KG/M2 | SYSTOLIC BLOOD PRESSURE: 110 MMHG | OXYGEN SATURATION: 98 % | TEMPERATURE: 97.3 F | DIASTOLIC BLOOD PRESSURE: 80 MMHG | HEART RATE: 74 BPM | WEIGHT: 207 LBS | RESPIRATION RATE: 16 BRPM

## 2020-09-24 DIAGNOSIS — F41.9 ANXIETY AND DEPRESSION: ICD-10-CM

## 2020-09-24 DIAGNOSIS — M51.17 INTERVERTEBRAL DISC DISORDER WITH RADICULOPATHY OF LUMBOSACRAL REGION: ICD-10-CM

## 2020-09-24 DIAGNOSIS — F32.A ANXIETY AND DEPRESSION: ICD-10-CM

## 2020-09-24 DIAGNOSIS — Z23 NEED FOR INFLUENZA VACCINATION: ICD-10-CM

## 2020-09-24 DIAGNOSIS — J30.1 SEASONAL ALLERGIC RHINITIS DUE TO POLLEN: Primary | ICD-10-CM

## 2020-09-24 PROBLEM — Z98.890 HISTORY OF COLONOSCOPY WITH POLYPECTOMY: Status: RESOLVED | Noted: 2018-02-19 | Resolved: 2020-09-24

## 2020-09-24 PROBLEM — Z86.010 HISTORY OF COLONOSCOPY WITH POLYPECTOMY: Status: RESOLVED | Noted: 2018-02-19 | Resolved: 2020-09-24

## 2020-09-24 PROBLEM — Z86.0100 HISTORY OF COLONOSCOPY WITH POLYPECTOMY: Status: RESOLVED | Noted: 2018-02-19 | Resolved: 2020-09-24

## 2020-09-24 PROBLEM — Z98.890 HISTORY OF SPINAL SURGERY: Status: RESOLVED | Noted: 2018-05-21 | Resolved: 2020-09-24

## 2020-09-24 PROCEDURE — 90471 IMMUNIZATION ADMIN: CPT | Performed by: NURSE PRACTITIONER

## 2020-09-24 PROCEDURE — 99214 OFFICE O/P EST MOD 30 MIN: CPT | Performed by: NURSE PRACTITIONER

## 2020-09-24 PROCEDURE — 90686 IIV4 VACC NO PRSV 0.5 ML IM: CPT | Performed by: NURSE PRACTITIONER

## 2020-09-24 RX ORDER — DULOXETIN HYDROCHLORIDE 60 MG/1
60 CAPSULE, DELAYED RELEASE ORAL DAILY
Qty: 30 CAPSULE | Refills: 2 | Status: SHIPPED | OUTPATIENT
Start: 2020-09-24 | End: 2020-12-14

## 2020-09-24 NOTE — PROGRESS NOTES
Subjective    Oksana Pillai is a 45 y.o. female presenting today for   Chief Complaint   Patient presents with   • Establish Care   • Med Refill   • Anxiety   • Depression       History of Present Illness     Oksana Pillai presents today as a new patient to me to establish care.   Prior PCP was Sona Orellana.  Patient Care Team:  Julia Oneal APRN as PCP - General (Family Medicine)  Brian Ojeda MD as Surgeon (Neurosurgery)  Marissa Avendano MD as Consulting Physician (Gastroenterology)  Kristyn Fontaine MD as Consulting Physician (Obstetrics and Gynecology)  Iggy Bartlett MD as Consulting Physician (Pain Medicine)    Current/chronic health conditions include:    Patient Active Problem List   Diagnosis   • Calf muscle weakness   • Intervertebral disc disorder with radiculopathy of lumbosacral region   • Chronic bilateral low back pain with right-sided sciatica   • DDD (degenerative disc disease), lumbar   • Allergic rhinitis   • Anxiety and depression   • Suspected sleep apnea   • Obesity (BMI 30-39.9)   • TMJ (temporomandibular joint disorder)   • Hypersomnia with sleep apnea   • Sleep-related bruxism   • PLMD (periodic limb movement disorder)   • Lumbar spondylolysis   • Chronic pain syndrome         Current Outpatient Medications:   •  Calcium Carbonate-Vit D-Min (CALCIUM 1200 PO), Take  by mouth., Disp: , Rfl:   •  DULoxetine (CYMBALTA) 60 MG capsule, Take 1 capsule by mouth Daily., Disp: 30 capsule, Rfl: 2  •  fexofenadine (ALLEGRA) 60 MG tablet, Take 60 mg by mouth Daily., Disp: , Rfl:   •  fluticasone (FLONASE) 50 MCG/ACT nasal spray, 2 sprays into the nostril(s) as directed by provider Daily., Disp: , Rfl:   •  levonorgestrel (MIRENA) 20 MCG/24HR IUD, 1 each by Intrauterine route 1 (One) Time., Disp: , Rfl:     AR - seasonal in spring and fall; taking Allegra daily and PRN Flonase for PND; this keeps s&s under reasonable control    She has chronic pain . The onset of this was in 2016. This  "primarily originates from herniation of the discs in her lumbar spine. She had spinal fusion in 2017 but continues to have back pain. She has injections w/ PM. She also practices holistic PT w/ yoga. She continues to have R sided numbness. She continues to follow w/ NS, Dr. Ojeda.    She has not been taking as good of care as herself as she normally would d/t restrictions r/t COVID and having her children at home. She sees a functional medicine provider who recommended she follow a gluten-free diet. Felicia Rodriguez, [place] [facility].    She has 3 boys at home. One of her sons in on the Autism Spectrum. Following her second delivery she returned to working outside her home. She began to experience \"visions\" and was diagnosed w/ \"post-partum blues.\" She was treated w/ Sertraline. Eventually this became less effective and she was transitioned to Pristiq. This caused significant SEs. She was transtioned to Duloxetine. This had initially been helpful but again the effectiveness has seemed to wane. She was referred to psychiatry. She saw an PMHNP but would prefer to see an MD. She has not been able to find anyone who accepts her insurance. At this point, things are stable but not perfect. She would prefer to stay on Cymbalta for now. Denies SI/HI.        The following portions of the patient's history were reviewed and updated as appropriate: allergies, current medications, problem list, past medical history, past surgical history, family history, and social history.     Review of Systems   Constitutional: Negative.    HENT: Positive for congestion and postnasal drip.    Eyes: Negative.    Respiratory: Negative.    Cardiovascular: Negative.    Gastrointestinal: Negative.    Endocrine: Negative.    Genitourinary: Negative.    Musculoskeletal: Positive for back pain.   Skin: Negative.    Neurological: Negative.    Hematological: Negative.    Psychiatric/Behavioral: Positive for depressed mood and stress. The " "patient is nervous/anxious.        Objective    Vitals:    09/24/20 0908   BP: 110/80   BP Location: Left arm   Patient Position: Sitting   Cuff Size: Adult   Pulse: 74   Resp: 16   Temp: 97.3 °F (36.3 °C)   TempSrc: Temporal   SpO2: 98%   Weight: 93.9 kg (207 lb)   Height: 170 cm (66.93\")     Body mass index is 32.49 kg/m².  Nursing notes and vitals reviewed.    Physical Exam  Constitutional:       General: She is not in acute distress.     Appearance: She is well-developed.   Neck:      Musculoskeletal: Neck supple.      Thyroid: No thyroid mass or thyromegaly.   Cardiovascular:      Rate and Rhythm: Regular rhythm.      Heart sounds: S1 normal and S2 normal.   Pulmonary:      Effort: Pulmonary effort is normal.      Breath sounds: Normal breath sounds.   Lymphadenopathy:      Cervical: No cervical adenopathy.   Neurological:      Mental Status: She is alert and oriented to person, place, and time.   Psychiatric:         Attention and Perception: She is attentive.         Behavior: Behavior normal.         Thought Content: Thought content normal.         No results found for this or any previous visit (from the past 672 hour(s)).      Assessment and Plan    Oksana was seen today for establish care, med refill, anxiety and depression.    Diagnoses and all orders for this visit:    Seasonal allergic rhinitis due to pollen  Comments:  - stable    Intervertebral disc disorder with radiculopathy of lumbosacral region  Comments:  - f/b NS    Anxiety and depression  -     DULoxetine (CYMBALTA) 60 MG capsule; Take 1 capsule by mouth Daily.    Need for influenza vaccination  -     Fluarix/Fluzone/Afluria Quad>6 Months        Except as noted above, pt will continue current medications as noted in the medication list.    Continue to follow with specialty care as directed by them.        Medications, including side effects, were discussed with the patient. Patient verbalized understanding.  The plan of care was discussed. All " questions were answered. Patient verbalized understanding.        Return in about 6 weeks (around 11/5/2020) for Annual physical.

## 2020-09-29 ENCOUNTER — APPOINTMENT (OUTPATIENT)
Dept: WOMENS IMAGING | Facility: HOSPITAL | Age: 45
End: 2020-09-29

## 2020-09-29 PROCEDURE — 77063 BREAST TOMOSYNTHESIS BI: CPT | Performed by: RADIOLOGY

## 2020-09-29 PROCEDURE — 77067 SCR MAMMO BI INCL CAD: CPT | Performed by: RADIOLOGY

## 2020-10-30 DIAGNOSIS — Z00.00 ROUTINE HEALTH MAINTENANCE: Primary | ICD-10-CM

## 2020-10-31 LAB
25(OH)D3+25(OH)D2 SERPL-MCNC: 31.4 NG/ML (ref 30–100)
ALBUMIN SERPL-MCNC: 4.2 G/DL (ref 3.5–5.2)
ALBUMIN/GLOB SERPL: 1.8 G/DL
ALP SERPL-CCNC: 129 U/L (ref 39–117)
ALT SERPL-CCNC: 34 U/L (ref 1–33)
AST SERPL-CCNC: 28 U/L (ref 1–32)
BASOPHILS # BLD AUTO: 0.01 10*3/MM3 (ref 0–0.2)
BASOPHILS NFR BLD AUTO: 0.1 % (ref 0–1.5)
BILIRUB SERPL-MCNC: 0.5 MG/DL (ref 0–1.2)
BUN SERPL-MCNC: 15 MG/DL (ref 6–20)
BUN/CREAT SERPL: 15.6 (ref 7–25)
CALCIUM SERPL-MCNC: 9.2 MG/DL (ref 8.6–10.5)
CHLORIDE SERPL-SCNC: 102 MMOL/L (ref 98–107)
CHOLEST SERPL-MCNC: 178 MG/DL (ref 0–200)
CHOLEST/HDLC SERPL: 3.71 {RATIO}
CO2 SERPL-SCNC: 27.7 MMOL/L (ref 22–29)
CREAT SERPL-MCNC: 0.96 MG/DL (ref 0.57–1)
EOSINOPHIL # BLD AUTO: 0.12 10*3/MM3 (ref 0–0.4)
EOSINOPHIL NFR BLD AUTO: 1.6 % (ref 0.3–6.2)
ERYTHROCYTE [DISTWIDTH] IN BLOOD BY AUTOMATED COUNT: 11.9 % (ref 12.3–15.4)
GLOBULIN SER CALC-MCNC: 2.4 GM/DL
GLUCOSE SERPL-MCNC: 73 MG/DL (ref 65–99)
HCT VFR BLD AUTO: 42.7 % (ref 34–46.6)
HCV AB S/CO SERPL IA: <0.1 S/CO RATIO (ref 0–0.9)
HDLC SERPL-MCNC: 48 MG/DL (ref 40–60)
HGB BLD-MCNC: 14.4 G/DL (ref 12–15.9)
IMM GRANULOCYTES # BLD AUTO: 0.01 10*3/MM3 (ref 0–0.05)
IMM GRANULOCYTES NFR BLD AUTO: 0.1 % (ref 0–0.5)
LDLC SERPL CALC-MCNC: 109 MG/DL (ref 0–100)
LYMPHOCYTES # BLD AUTO: 2.25 10*3/MM3 (ref 0.7–3.1)
LYMPHOCYTES NFR BLD AUTO: 29.7 % (ref 19.6–45.3)
MCH RBC QN AUTO: 32.2 PG (ref 26.6–33)
MCHC RBC AUTO-ENTMCNC: 33.7 G/DL (ref 31.5–35.7)
MCV RBC AUTO: 95.5 FL (ref 79–97)
MONOCYTES # BLD AUTO: 0.54 10*3/MM3 (ref 0.1–0.9)
MONOCYTES NFR BLD AUTO: 7.1 % (ref 5–12)
NEUTROPHILS # BLD AUTO: 4.65 10*3/MM3 (ref 1.7–7)
NEUTROPHILS NFR BLD AUTO: 61.4 % (ref 42.7–76)
NRBC BLD AUTO-RTO: 0 /100 WBC (ref 0–0.2)
PLATELET # BLD AUTO: 190 10*3/MM3 (ref 140–450)
POTASSIUM SERPL-SCNC: 4 MMOL/L (ref 3.5–5.2)
PROT SERPL-MCNC: 6.6 G/DL (ref 6–8.5)
RBC # BLD AUTO: 4.47 10*6/MM3 (ref 3.77–5.28)
SODIUM SERPL-SCNC: 139 MMOL/L (ref 136–145)
TRIGL SERPL-MCNC: 114 MG/DL (ref 0–150)
TSH SERPL DL<=0.005 MIU/L-ACNC: 3.42 UIU/ML (ref 0.45–4.5)
VLDLC SERPL CALC-MCNC: 21 MG/DL (ref 5–40)
WBC # BLD AUTO: 7.58 10*3/MM3 (ref 3.4–10.8)

## 2020-11-12 ENCOUNTER — OFFICE VISIT (OUTPATIENT)
Dept: INTERNAL MEDICINE | Facility: CLINIC | Age: 45
End: 2020-11-12

## 2020-11-12 ENCOUNTER — TELEPHONE (OUTPATIENT)
Dept: INTERNAL MEDICINE | Facility: CLINIC | Age: 45
End: 2020-11-12

## 2020-11-12 VITALS
WEIGHT: 210 LBS | DIASTOLIC BLOOD PRESSURE: 72 MMHG | TEMPERATURE: 97.8 F | HEIGHT: 67 IN | RESPIRATION RATE: 16 BRPM | HEART RATE: 70 BPM | BODY MASS INDEX: 32.96 KG/M2 | OXYGEN SATURATION: 98 % | SYSTOLIC BLOOD PRESSURE: 112 MMHG

## 2020-11-12 DIAGNOSIS — F32.A ANXIETY AND DEPRESSION: ICD-10-CM

## 2020-11-12 DIAGNOSIS — Z00.00 ENCOUNTER FOR WELLNESS EXAMINATION IN ADULT: Primary | ICD-10-CM

## 2020-11-12 DIAGNOSIS — F41.9 ANXIETY AND DEPRESSION: ICD-10-CM

## 2020-11-12 DIAGNOSIS — E55.9 VITAMIN D DEFICIENCY: ICD-10-CM

## 2020-11-12 PROCEDURE — 99396 PREV VISIT EST AGE 40-64: CPT | Performed by: NURSE PRACTITIONER

## 2020-11-12 RX ORDER — ERGOCALCIFEROL 1.25 MG/1
50000 CAPSULE ORAL
Qty: 5 CAPSULE | Refills: 3 | Status: SHIPPED | OUTPATIENT
Start: 2020-11-12 | End: 2021-08-18

## 2020-11-12 NOTE — TELEPHONE ENCOUNTER
PATIENT STATES WHILE SCHEDULING HER 6 MONTH APPOINTMENT IN MAY THE STAFF ASKED HER IF SHE NEEDED LABS?  ERIN STATED THAT JORDIN DIDN'T MENTION IT.  THE STAFF PERSONNEL STATED WELL IT DIDN'T SAY IT HERE, I GUESS NOT.  PATIENT THINKS THAT THE  SHOULD HAVE VERIFIED WITH DOCTOR BEFORE SCHEDULING AND BEFORE PATIENT LEFT TO CLARIFY..    PLEASE ADVISE IF SHE DOES NEED LABS AND CONTACT HER :[phone]    ATTN: BRAVO VIRGEN

## 2020-11-12 NOTE — PROGRESS NOTES
VIC Gandhi is a 45 y.o. female presenting for Annual Exam    Her current/chronic health conditions include:  Patient Active Problem List   Diagnosis   • Calf muscle weakness   • Intervertebral disc disorder with radiculopathy of lumbosacral region   • Chronic bilateral low back pain with right-sided sciatica   • DDD (degenerative disc disease), lumbar   • Allergic rhinitis   • Anxiety and depression   • Suspected sleep apnea   • Obesity (BMI 30-39.9)   • TMJ (temporomandibular joint disorder)   • Hypersomnia with sleep apnea   • Sleep-related bruxism   • PLMD (periodic limb movement disorder)   • Lumbar spondylolysis   • Chronic pain syndrome       Current Outpatient Medications on File Prior to Visit   Medication Sig   • Calcium Carbonate-Vit D-Min (CALCIUM 1200 PO) Take  by mouth.   • DULoxetine (CYMBALTA) 60 MG capsule Take 1 capsule by mouth Daily.   • fexofenadine (ALLEGRA) 60 MG tablet Take 60 mg by mouth Daily.   • fluticasone (FLONASE) 50 MCG/ACT nasal spray 2 sprays into the nostril(s) as directed by provider Daily.   • levonorgestrel (MIRENA) 20 MCG/24HR IUD 1 each by Intrauterine route 1 (One) Time.     No current facility-administered medications on file prior to visit.      Her moods are stable with Cymbalta.    Health Habits:  Dental Exam: UTD  Eye Exam: NUTD  Exercise: on occas w/ bi weekly PT times/week.  Current exercise activities include: walking    Screenings:  Last pap date: 09/28/2020 w/ GYN ([facility])  Mammogram: UTD w/ GYN   Dexa: n/a  Colonoscopy: 02/18  Tob use: never        The following portions of the patient's history were reviewed and updated as appropriate: allergies, current medications, problem list, past medical history, past family history, past medical history, and past social history.    Review of Systems   Constitutional: Negative.    HENT: Negative.    Eyes: Negative.    Respiratory: Negative.    Cardiovascular: Negative.    Gastrointestinal: Negative.    Endocrine:  "Negative.    Genitourinary: Negative.    Musculoskeletal: Negative.    Skin: Negative.    Allergic/Immunologic: Negative.    Neurological: Negative.    Hematological: Negative.    Psychiatric/Behavioral: Negative.        OBJECTIVE    /72 (BP Location: Right arm, Patient Position: Sitting, Cuff Size: Adult)   Pulse 70   Temp 97.8 °F (36.6 °C) (Temporal)   Resp 16   Ht 170 cm (66.93\")   Wt 95.3 kg (210 lb)   SpO2 98%   BMI 32.96 kg/m²   Body mass index is 32.96 kg/m².  Nursing notes and vital signs reviewed.    Physical Exam  Constitutional:       General: She is not in acute distress.     Appearance: Normal appearance. She is well-developed.   HENT:      Head: Normocephalic.      Right Ear: Hearing, tympanic membrane, ear canal and external ear normal.      Left Ear: Hearing, tympanic membrane, ear canal and external ear normal.      Nose: Nose normal. No mucosal edema or rhinorrhea.      Mouth/Throat:      Mouth: Mucous membranes are moist.      Pharynx: Oropharynx is clear. Uvula midline.   Eyes:      General: Lids are normal.      Extraocular Movements: Extraocular movements intact.      Conjunctiva/sclera: Conjunctivae normal.      Pupils: Pupils are equal, round, and reactive to light.   Neck:      Musculoskeletal: Normal range of motion and neck supple.      Thyroid: No thyroid mass or thyromegaly.   Cardiovascular:      Rate and Rhythm: Regular rhythm.      Pulses: Normal pulses.      Heart sounds: S1 normal and S2 normal. No murmur. No friction rub. No gallop.    Pulmonary:      Effort: Pulmonary effort is normal.      Breath sounds: Normal breath sounds. No wheezing, rhonchi or rales.   Abdominal:      General: Bowel sounds are normal.      Palpations: Abdomen is soft.      Tenderness: There is no abdominal tenderness. There is no guarding.      Hernia: No hernia is present.   Musculoskeletal: Normal range of motion.         General: No deformity.   Lymphadenopathy:      Cervical: No cervical " adenopathy.   Skin:     General: Skin is warm and dry.      Findings: No lesion or rash.   Neurological:      General: No focal deficit present.      Mental Status: She is alert and oriented to person, place, and time.      Cranial Nerves: No cranial nerve deficit.      Sensory: No sensory deficit.      Motor: Motor function is intact.      Coordination: Coordination is intact.      Gait: Gait normal.      Deep Tendon Reflexes: Reflexes are normal and symmetric.   Psychiatric:         Attention and Perception: She is attentive.         Mood and Affect: Mood and affect normal.         Speech: Speech normal.         Behavior: Behavior normal.         Thought Content: Thought content normal.         Recent Results (from the past 672 hour(s))   CBC & Differential    Collection Time: 10/30/20  8:11 AM    Specimen: Blood   Result Value Ref Range    WBC 7.58 3.40 - 10.80 10*3/mm3    RBC 4.47 3.77 - 5.28 10*6/mm3    Hemoglobin 14.4 12.0 - 15.9 g/dL    Hematocrit 42.7 34.0 - 46.6 %    MCV 95.5 79.0 - 97.0 fL    MCH 32.2 26.6 - 33.0 pg    MCHC 33.7 31.5 - 35.7 g/dL    RDW 11.9 (L) 12.3 - 15.4 %    Platelets 190 140 - 450 10*3/mm3    Neutrophil Rel % 61.4 42.7 - 76.0 %    Lymphocyte Rel % 29.7 19.6 - 45.3 %    Monocyte Rel % 7.1 5.0 - 12.0 %    Eosinophil Rel % 1.6 0.3 - 6.2 %    Basophil Rel % 0.1 0.0 - 1.5 %    Neutrophils Absolute 4.65 1.70 - 7.00 10*3/mm3    Lymphocytes Absolute 2.25 0.70 - 3.10 10*3/mm3    Monocytes Absolute 0.54 0.10 - 0.90 10*3/mm3    Eosinophils Absolute 0.12 0.00 - 0.40 10*3/mm3    Basophils Absolute 0.01 0.00 - 0.20 10*3/mm3    Immature Granulocyte Rel % 0.1 0.0 - 0.5 %    Immature Grans Absolute 0.01 0.00 - 0.05 10*3/mm3    nRBC 0.0 0.0 - 0.2 /100 WBC   Comprehensive Metabolic Panel    Collection Time: 10/30/20  8:11 AM    Specimen: Blood   Result Value Ref Range    Glucose 73 65 - 99 mg/dL    BUN 15 6 - 20 mg/dL    Creatinine 0.96 0.57 - 1.00 mg/dL    eGFR Non African Am 63 >60 mL/min/1.73    eGFR   Am 77 >60 mL/min/1.73    BUN/Creatinine Ratio 15.6 7.0 - 25.0    Sodium 139 136 - 145 mmol/L    Potassium 4.0 3.5 - 5.2 mmol/L    Chloride 102 98 - 107 mmol/L    Total CO2 27.7 22.0 - 29.0 mmol/L    Calcium 9.2 8.6 - 10.5 mg/dL    Total Protein 6.6 6.0 - 8.5 g/dL    Albumin 4.20 3.50 - 5.20 g/dL    Globulin 2.4 gm/dL    A/G Ratio 1.8 g/dL    Total Bilirubin 0.5 0.0 - 1.2 mg/dL    Alkaline Phosphatase 129 (H) 39 - 117 U/L    AST (SGOT) 28 1 - 32 U/L    ALT (SGPT) 34 (H) 1 - 33 U/L   Hepatitis C Antibody    Collection Time: 10/30/20  8:11 AM    Specimen: Blood   Result Value Ref Range    Hep C Virus Ab <0.1 0.0 - 0.9 s/co ratio   Lipid Panel With / Chol / HDL Ratio    Collection Time: 10/30/20  8:11 AM    Specimen: Blood   Result Value Ref Range    Total Cholesterol 178 0 - 200 mg/dL    Triglycerides 114 0 - 150 mg/dL    HDL Cholesterol 48 40 - 60 mg/dL    VLDL Cholesterol Eduin 21 5 - 40 mg/dL    LDL Chol Calc (NIH) 109 (H) 0 - 100 mg/dL    Chol/HDL Ratio 3.71    Thyroid Cascade Profile    Collection Time: 10/30/20  8:11 AM    Specimen: Blood   Result Value Ref Range    TSH 3.420 0.450 - 4.500 uIU/mL   Vitamin D 25 Hydroxy    Collection Time: 10/30/20  8:11 AM    Specimen: Blood   Result Value Ref Range    25 Hydroxy, Vitamin D 31.4 30.0 - 100.0 ng/ml     Each of these lab results were discussed individually in detail with the patient.      ASSESSMENT AND PLAN    Diagnoses and all orders for this visit:    1. Encounter for wellness examination in adult (Primary)    2. Vitamin D deficiency  -     vitamin D (ERGOCALCIFEROL) 1.25 MG (28243 UT) capsule capsule; Take 1 capsule by mouth Every 14 (Fourteen) Days.  Dispense: 5 capsule; Refill: 3    3. Anxiety and depression  Comments:  - stable        Preventative counseling completed including relevant screenings, appropriate vaccinations, healthy nutrition, and appropriate physical activity.    Except as noted above, pt will continue current medications as noted in  the medication list.      Medications, including side effects, were discussed with the patient. Patient verbalized understanding.  The plan of care was discussed. All questions were answered. Patient verbalized understanding.        Return in about 6 months (around 5/12/2021)., or sooner if needed.

## 2020-11-13 NOTE — TELEPHONE ENCOUNTER
Left detailed message on vm to call and schedule an appt for labs 1 week prior to her follow up appt.

## 2020-12-12 DIAGNOSIS — F32.A ANXIETY AND DEPRESSION: ICD-10-CM

## 2020-12-12 DIAGNOSIS — F41.9 ANXIETY AND DEPRESSION: ICD-10-CM

## 2020-12-14 RX ORDER — DULOXETIN HYDROCHLORIDE 60 MG/1
CAPSULE, DELAYED RELEASE ORAL
Qty: 90 CAPSULE | Refills: 1 | Status: SHIPPED | OUTPATIENT
Start: 2020-12-14 | End: 2021-08-18

## 2021-01-05 ENCOUNTER — TELEPHONE (OUTPATIENT)
Dept: INTERNAL MEDICINE | Facility: CLINIC | Age: 46
End: 2021-01-05

## 2021-01-05 NOTE — TELEPHONE ENCOUNTER
Caller: Oksana Pillai    Relationship to patient: Self    Best call back number: [phone]    Concerns or Questions if Applicable: HAS COVID SYMPTOMS OF HEADACHE/CONGESTION/NO FEVER/IS WANTING TO KNOW IF CAN CALL IN SOMETHING FOR CONGESTION/ AND MOTHERINLAW BOTH TESTED POSITIVE FOR COVID AND PATIENT IS NOT FEELING WELL AT THIS TIME AND IS WANTING MEDICAL ADVICE ON WHAT TO DO NEXT

## 2021-03-18 ENCOUNTER — OFFICE VISIT (OUTPATIENT)
Dept: INTERNAL MEDICINE | Facility: CLINIC | Age: 46
End: 2021-03-18

## 2021-03-18 VITALS
HEIGHT: 67 IN | WEIGHT: 214.4 LBS | HEART RATE: 85 BPM | DIASTOLIC BLOOD PRESSURE: 88 MMHG | OXYGEN SATURATION: 99 % | SYSTOLIC BLOOD PRESSURE: 120 MMHG | BODY MASS INDEX: 33.65 KG/M2 | TEMPERATURE: 97.1 F | RESPIRATION RATE: 16 BRPM

## 2021-03-18 DIAGNOSIS — R51.9 ACUTE NONINTRACTABLE HEADACHE, UNSPECIFIED HEADACHE TYPE: Primary | ICD-10-CM

## 2021-03-18 PROCEDURE — 99213 OFFICE O/P EST LOW 20 MIN: CPT | Performed by: NURSE PRACTITIONER

## 2021-03-18 NOTE — PROGRESS NOTES
"Junior Pillai is a 45 y.o. female presenting today for follow up of   Chief Complaint   Patient presents with   • Headache   • Nausea       History of Present Illness     Patient Active Problem List   Diagnosis   • Calf muscle weakness   • Intervertebral disc disorder with radiculopathy of lumbosacral region   • Chronic bilateral low back pain with right-sided sciatica   • DDD (degenerative disc disease), lumbar   • Allergic rhinitis   • Anxiety and depression   • Suspected sleep apnea   • Obesity (BMI 30-39.9)   • TMJ (temporomandibular joint disorder)   • Hypersomnia with sleep apnea   • Sleep-related bruxism   • PLMD (periodic limb movement disorder)   • Lumbar spondylolysis   • Chronic pain syndrome       Current Outpatient Medications on File Prior to Visit   Medication Sig   • Calcium Carbonate-Vit D-Min (CALCIUM 1200 PO) Take  by mouth.   • DULoxetine (CYMBALTA) 60 MG capsule TAKE ONE CAPSULE BY MOUTH DAILY   • fexofenadine (ALLEGRA) 60 MG tablet Take 60 mg by mouth Daily.   • levonorgestrel (MIRENA) 20 MCG/24HR IUD 1 each by Intrauterine route 1 (One) Time.   • vitamin D (ERGOCALCIFEROL) 1.25 MG (23363 UT) capsule capsule Take 1 capsule by mouth Every 14 (Fourteen) Days.   • fluticasone (FLONASE) 50 MCG/ACT nasal spray 2 sprays into the nostril(s) as directed by provider Daily.     No current facility-administered medications on file prior to visit.      Pt presents c/o \"a couple of severe HA.\" Onset was two weeks ago. The first she believes was triggered by missed doses of Cymbalta. The second occurred 48 hours. Gradual onset. Frontal. Sharp. A/W nausea. Sensitive to light and sound. Lasted approx 8 hours. She took excedrine migraine x 2 doses. No recurrence since.    She had a job interview 24 hours prior to onset of HA but reports she is not struggling w/ stress. She has been working in the yard a lot and wonders if it could be r/t allergies.    She reports having COVID in Jan.    The following " "portions of the patient's history were reviewed and updated as appropriate: allergies, current medications, past family history, past medical history, past social history, past surgical history and problem list.    Review of Systems   Gastrointestinal: Positive for nausea.   Neurological: Positive for headache. Negative for tremors, seizures, weakness, numbness, memory problem and confusion.       Objective   Vitals:    03/18/21 1101   BP: 120/88   BP Location: Left arm   Patient Position: Sitting   Cuff Size: Large Adult   Pulse: 85   Resp: 16   Temp: 97.1 °F (36.2 °C)   TempSrc: Temporal   SpO2: 99%   Weight: 97.3 kg (214 lb 6.4 oz)   Height: 170 cm (66.93\")       BP Readings from Last 3 Encounters:   03/18/21 120/88   11/12/20 112/72   09/24/20 110/80        Wt Readings from Last 3 Encounters:   03/18/21 97.3 kg (214 lb 6.4 oz)   11/12/20 95.3 kg (210 lb)   09/24/20 93.9 kg (207 lb)        Body mass index is 33.65 kg/m².  Nursing notes and vitals reviewed.    Physical Exam  Constitutional:       General: She is not in acute distress.     Appearance: She is well-developed.   HENT:      Right Ear: Hearing, tympanic membrane, ear canal and external ear normal.      Left Ear: Hearing, tympanic membrane, ear canal and external ear normal.      Nose: Nose normal.      Mouth/Throat:      Mouth: Mucous membranes are moist.      Pharynx: Oropharynx is clear. Uvula midline.   Neck:      Thyroid: No thyroid mass or thyromegaly.   Cardiovascular:      Rate and Rhythm: Regular rhythm.      Pulses: Normal pulses.      Heart sounds: S1 normal and S2 normal. No murmur heard.   No friction rub. No gallop.    Pulmonary:      Effort: Pulmonary effort is normal.      Breath sounds: Normal breath sounds. No wheezing, rhonchi or rales.   Musculoskeletal:      Cervical back: Neck supple.   Lymphadenopathy:      Cervical: No cervical adenopathy.   Neurological:      General: No focal deficit present.      Mental Status: She is alert and " oriented to person, place, and time.      Cranial Nerves: No cranial nerve deficit.      Sensory: No sensory deficit.      Motor: Motor function is intact.      Coordination: Coordination is intact.      Gait: Gait is intact.      Deep Tendon Reflexes: Reflexes are normal and symmetric.   Psychiatric:         Attention and Perception: She is attentive.         Speech: Speech normal.         Behavior: Behavior normal.         No results found for this or any previous visit (from the past 672 hour(s)).      Assessment/Plan   Diagnoses and all orders for this visit:    1. Acute nonintractable headache, unspecified headache type (Primary)  Comments:  - no HA today  - change Vit D to 5000IU QD        Except as noted above, pt will continue current medications as noted in the medication list. I will continue to authorize refills as needed.        Medications, including side effects, were discussed with the patient. Patient verbalized understanding.  The plan of care was discussed. All questions were answered. Patient verbalized understanding.      Return for As Previously Scheduled.    I spent 30 minutes caring for Oksana on this date of service. This time includes time spent by me in the following activities:preparing for the visit, performing a medically appropriate examination and/or evaluation , counseling and educating the patient/family/caregiver, documenting information in the medical record and care coordination

## 2021-03-18 NOTE — PATIENT INSTRUCTIONS
Stop taking prescription Vitamin D every other week. Start taking an over-the-counter supplement of 5000IU each day.

## 2021-03-29 ENCOUNTER — APPOINTMENT (OUTPATIENT)
Dept: VACCINE CLINIC | Facility: HOSPITAL | Age: 46
End: 2021-03-29

## 2021-04-22 ENCOUNTER — TELEPHONE (OUTPATIENT)
Dept: INTERNAL MEDICINE | Facility: CLINIC | Age: 46
End: 2021-04-22

## 2021-04-22 DIAGNOSIS — E55.9 VITAMIN D DEFICIENCY: Primary | ICD-10-CM

## 2021-05-18 ENCOUNTER — TELEPHONE (OUTPATIENT)
Dept: NEUROSURGERY | Facility: CLINIC | Age: 46
End: 2021-05-18

## 2021-06-16 NOTE — TELEPHONE ENCOUNTER
Called patient to inform her that Dr. Ojeda looked at her report.  She informed me that she is going to start epidural injections with [facility] and she will keep us informed.

## 2021-08-11 ENCOUNTER — TELEPHONE (OUTPATIENT)
Dept: PAIN MEDICINE | Facility: CLINIC | Age: 46
End: 2021-08-11

## 2021-08-11 ENCOUNTER — OFFICE VISIT (OUTPATIENT)
Dept: NEUROSURGERY | Facility: CLINIC | Age: 46
End: 2021-08-11

## 2021-08-11 VITALS
SYSTOLIC BLOOD PRESSURE: 126 MMHG | DIASTOLIC BLOOD PRESSURE: 78 MMHG | WEIGHT: 210 LBS | BODY MASS INDEX: 31.1 KG/M2 | HEIGHT: 69 IN

## 2021-08-11 DIAGNOSIS — M50.322 DEGENERATION OF INTERVERTEBRAL DISC AT C5-C6 LEVEL: Primary | ICD-10-CM

## 2021-08-11 DIAGNOSIS — M48.062 SPINAL STENOSIS OF LUMBAR REGION WITH NEUROGENIC CLAUDICATION: ICD-10-CM

## 2021-08-11 DIAGNOSIS — M62.81 CALF MUSCLE WEAKNESS: ICD-10-CM

## 2021-08-11 PROCEDURE — 99214 OFFICE O/P EST MOD 30 MIN: CPT | Performed by: NEUROLOGICAL SURGERY

## 2021-08-11 RX ORDER — SERTRALINE HYDROCHLORIDE 100 MG/1
175 TABLET, FILM COATED ORAL DAILY
COMMUNITY
Start: 2021-06-16 | End: 2021-09-14

## 2021-08-11 RX ORDER — METHYLPREDNISOLONE 4 MG/1
TABLET ORAL
Qty: 21 TABLET | Refills: 0 | Status: SHIPPED | OUTPATIENT
Start: 2021-08-11 | End: 2021-08-18

## 2021-08-11 RX ORDER — CYCLOBENZAPRINE HCL 10 MG
10 TABLET ORAL 3 TIMES DAILY PRN
COMMUNITY
End: 2021-08-23 | Stop reason: HOSPADM

## 2021-08-11 RX ORDER — FLUTICASONE PROPIONATE 93 UG/1
2 SPRAY, METERED NASAL 2 TIMES DAILY
COMMUNITY
Start: 2021-08-10

## 2021-08-11 RX ORDER — DIPHENOXYLATE HYDROCHLORIDE AND ATROPINE SULFATE 2.5; .025 MG/1; MG/1
1 TABLET ORAL DAILY
COMMUNITY

## 2021-08-11 RX ORDER — BUSPIRONE HYDROCHLORIDE 10 MG/1
10 TABLET ORAL
COMMUNITY
Start: 2021-07-28

## 2021-08-11 RX ORDER — VITAMIN E 268 MG
400 CAPSULE ORAL DAILY
COMMUNITY

## 2021-08-11 RX ORDER — CHLORAL HYDRATE 500 MG
1000 CAPSULE ORAL
COMMUNITY

## 2021-08-11 RX ORDER — LORAZEPAM 2 MG/1
2 TABLET ORAL AS NEEDED
COMMUNITY
Start: 2021-06-11

## 2021-08-11 RX ORDER — FLUCONAZOLE 200 MG/1
TABLET ORAL
COMMUNITY
Start: 2021-04-14 | End: 2021-08-18

## 2021-08-11 RX ORDER — RIMEGEPANT SULFATE 75 MG/75MG
75 TABLET, ORALLY DISINTEGRATING ORAL DAILY PRN
COMMUNITY
Start: 2021-07-27

## 2021-08-11 NOTE — PROGRESS NOTES
Subjective   Patient ID: Oksana Pillai is a 45 y.o. female is here today for follow-up with a new Cervical, Lumbar and Sacral MRI. Ms. Pillai was last seen on 06-15-20.    Today, Ms. Pillai reports left arm pain. She doesn't really have neck pain. She had a cervical MITCH on 06-24-21. She reports intermittent back pain that radiates into the right thigh and knee. She reports that she has severe pain in her right middle toe. She reports numbness on the side of her right foot.       She is currently going to physical therapy.     Its been over a year since have seen this patient.  She is recently developed recurrent right buttock and leg pain and today on examination she is quite weak in her right calf.  She did not actually recognize that until we tested her today.  Her MRI did show some disc space collapse and some foraminal stenosis at L5-S1 compressing the S1 root causing the weakness.  Her sacral MRI was unremarkable.  She also has had some neck pain and left arm pain a few months ago that started.  She had a cervical MRI that showed some mild disc bulging.  She is in physical therapy.  She got an epidural block by Dr. Collier who is now her pain physician.  That was in the neck and it seems to be helping.  I told her that there is really nothing surgical to do in the neck issue should resolve with time.  But her right calf weakness and persistent pain are worrisome.  She is probably going to need to be fused at L5-S1 with an interbody technique.  We will get a CT scan using the Mazor protocol and flexion-extension films and have her come back to see me after steroid pack next week.  If she is not much better then we will have to talk about doing a posterior lumbar interbody fusion with cages at L5-S1 with transcortical pedicle screw fixation.          Arm Pain   The incident occurred more than 1 week ago. The pain is present in the left clavicle, left elbow, left fingers and left forearm. The pain is moderate.  "Associated symptoms include numbness. Pertinent negatives include no chest pain or tingling. Treatments tried: C-MITCH 06-24-21, physical therapy.   Back Pain  The problem occurs intermittently. The problem has been gradually worsening since onset. The pain is present in the lumbar spine. The quality of the pain is described as shooting. The pain radiates to the right thigh, right knee and right foot. The pain is moderate. The symptoms are aggravated by position. Associated symptoms include numbness. Pertinent negatives include no bladder incontinence, bowel incontinence, chest pain, fever, tingling or weakness. Treatments tried: physical therapy.       The following portions of the patient's history were reviewed and updated as appropriate: allergies, current medications, past family history, past medical history, past social history, past surgical history and problem list.    Review of Systems   Constitutional: Negative for fever.   Respiratory: Negative for chest tightness and shortness of breath.    Cardiovascular: Negative for chest pain.   Gastrointestinal: Negative for bowel incontinence.   Genitourinary: Negative for bladder incontinence.   Musculoskeletal: Positive for back pain.   Neurological: Positive for numbness. Negative for tingling and weakness.   All other systems reviewed and are negative.          Objective     Vitals:    08/11/21 1659   BP: 126/78   Weight: 95.3 kg (210 lb)   Height: 176.1 cm (69.33\")     Body mass index is 30.72 kg/m².      Physical Exam  Constitutional:       Appearance: She is well-developed.   HENT:      Head: Normocephalic and atraumatic.   Eyes:      Extraocular Movements: EOM normal.      Conjunctiva/sclera: Conjunctivae normal.      Pupils: Pupils are equal, round, and reactive to light.   Neck:      Vascular: No carotid bruit.   Neurological:      Mental Status: She is oriented to person, place, and time.      Coordination: Finger-Nose-Finger Test and Heel to Shin Test " normal.      Gait: Gait is intact.      Deep Tendon Reflexes:      Reflex Scores:       Tricep reflexes are 2+ on the right side and 2+ on the left side.       Bicep reflexes are 2+ on the right side and 2+ on the left side.       Brachioradialis reflexes are 2+ on the right side and 2+ on the left side.       Patellar reflexes are 2+ on the right side and 2+ on the left side.       Achilles reflexes are 2+ on the right side and 2+ on the left side.  Psychiatric:         Speech: Speech normal.       Neurologic Exam     Mental Status   Oriented to person, place, and time.   Registration of memory: Good recent and remote memory.   Attention: normal. Concentration: normal.   Speech: speech is normal   Level of consciousness: alert  Knowledge: consistent with education.     Cranial Nerves     CN II   Visual fields full to confrontation.   Visual acuity: normal    CN III, IV, VI   Pupils are equal, round, and reactive to light.  Extraocular motions are normal.     CN V   Facial sensation intact.   Right corneal reflex: normal  Left corneal reflex: normal    CN VII   Facial expression full, symmetric.   Right facial weakness: none  Left facial weakness: none    CN VIII   Hearing: intact    CN IX, X   Palate: symmetric    CN XI   Right sternocleidomastoid strength: normal  Left sternocleidomastoid strength: normal    CN XII   Tongue: not atrophic  Tongue deviation: none    Motor Exam   Muscle bulk: normal  Right arm tone: normal  Left arm tone: normal  Right leg tone: normal  Left leg tone: normal    Strength   Strength 5/5 except as noted.   Right gastroc: 2/5    Sensory Exam   Light touch normal.     Gait, Coordination, and Reflexes     Gait  Gait: normal    Coordination   Finger to nose coordination: normal  Heel to shin coordination: normal    Reflexes   Right brachioradialis: 2+  Left brachioradialis: 2+  Right biceps: 2+  Left biceps: 2+  Right triceps: 2+  Left triceps: 2+  Right patellar: 2+  Left patellar:  2+  Right achilles: 2+  Left achilles: 2+  Right : 2+  Left : 2+          Assessment/Plan   Independent Review of Radiographic Studies:      I personally reviewed the images from the following studies.    I reviewed the cervical MRI done on 8/10/2021 which does show some mild disc bulging at C5-C6 and C6-C7 but nothing dramatic.  The sacral MRI was unremarkable.  The lumbar MRI did show some disc space collapse at L5-S1 and some lateral recess stenosis bilaterally.  Agree with the report.        Medical Decision Making:      The weakness is striking.  We will give her a medrol dose pack and get a CAT scan of her lumbar spine as well as flexion-extension films and have her come back next week.  If she is not much better in terms of strength or pain, will need to talk about an L5-S1 decompression and fusion.      Diagnoses and all orders for this visit:    1. Degeneration of intervertebral disc at C5-C6 level (Primary)    2. Spinal stenosis of lumbar region with neurogenic claudication  -     XR Spine Lumbar Complete With Flex & Ext; Future  -     CT Lumbar Spine Without Contrast; Future    3. Calf muscle weakness  -     XR Spine Lumbar Complete With Flex & Ext; Future  -     CT Lumbar Spine Without Contrast; Future    Other orders  -     methylPREDNISolone (MEDROL) 4 MG dose pack; Take as directed on package instructions.  Dispense: 21 tablet; Refill: 0      Return in about 9 days (around 8/20/2021) for Face-to-face, in the afternoon.

## 2021-08-11 NOTE — TELEPHONE ENCOUNTER
Caller: ERIN DALE    Relationship to patient: SELF    Best call back number: [phone]    Patient is needing: WANTED TO KNOW SOME DATES OF SOME PAST INJECTIONS.  SHE WOULD ALSO LIKE TO KNOW IF SHE EVER RECEIVED A TRIGGER POINT INJECTION.

## 2021-08-12 ENCOUNTER — OFFICE VISIT (OUTPATIENT)
Dept: NEUROSURGERY | Facility: CLINIC | Age: 46
End: 2021-08-12

## 2021-08-12 ENCOUNTER — TELEPHONE (OUTPATIENT)
Dept: NEUROSURGERY | Facility: CLINIC | Age: 46
End: 2021-08-12

## 2021-08-12 DIAGNOSIS — Z98.890 HISTORY OF SPINAL SURGERY: Primary | ICD-10-CM

## 2021-08-12 DIAGNOSIS — M51.16 HERNIATION OF LUMBAR INTERVERTEBRAL DISC WITH RADICULOPATHY: ICD-10-CM

## 2021-08-12 DIAGNOSIS — M62.81 CALF MUSCLE WEAKNESS: ICD-10-CM

## 2021-08-12 PROCEDURE — 99442 PR PHYS/QHP TELEPHONE EVALUATION 11-20 MIN: CPT | Performed by: NEUROLOGICAL SURGERY

## 2021-08-12 RX ORDER — HYDROCODONE BITARTRATE AND ACETAMINOPHEN 5; 325 MG/1; MG/1
1 TABLET ORAL EVERY 8 HOURS PRN
Qty: 30 TABLET | Refills: 0 | Status: SHIPPED | OUTPATIENT
Start: 2021-08-12 | End: 2021-08-23 | Stop reason: HOSPADM

## 2021-08-12 RX ORDER — CEFAZOLIN SODIUM 2 G/100ML
2 INJECTION, SOLUTION INTRAVENOUS ONCE
Status: CANCELLED | OUTPATIENT
Start: 2021-08-20 | End: 2021-08-12

## 2021-08-12 NOTE — H&P (VIEW-ONLY)
Subjective   Patient ID: Oksana Pillai is a 45 y.o. female is here today for follow-up.  She was last seen 8/11/21 for Degeneration og intervertebral disc at C5-C6 level, Spinal stenosis of lumbar region with neurogenic claudication, calf muscle weakness.  A medrol dose pack was ordered as well as imaging.    You have chosen to receive care through a telephone visit. Do you consent to use a telephone visit for your medical care today? Yes    Today patient reports to discuss surgery options    This was a televisit from the hospital lasting 14 minutes.  The patient was at home.  I saw her yesterday and she did have pretty severe weakness of 2/5 in the right calf.  I think it is predominantly due to lateral recess stenosis and S1 compression from disc base collapse.  But rather than delaying this I think it is better to move forward with a fusion surgery to decompress the nerve and help with the right leg pain and hopefully get her to improve in terms of her right calf weakness.  I told her that I think there is room for improvement but I cannot guarantee that will come back all the way obviously.  But if we do not do this soon she may end up with permanent weakness which is debilitating as far as her walking is even if the pain is better.  We will try and get this done next week on 8/20/2021.  The goals risks and benefits are described below.  She has a stop her Advil.  We will give her some pain medications to use in the interim.    History of Present Illness    The following portions of the patient's history were reviewed and updated as appropriate: allergies, current medications, past family history, past medical history, past social history, past surgical history and problem list.    Review of Systems        Objective     There were no vitals filed for this visit.  There is no height or weight on file to calculate BMI.      Physical Exam   Deferred  Neurologic Exam   Furred        Assessment/Plan   Independent Review  of Radiographic Studies:      I personally reviewed the images from the following studies.    I reviewed the cervical MRI done on 8/10/2021 which does show some mild disc bulging at C5-C6 and C6-C7 but nothing dramatic.  The sacral MRI was unremarkable.  The lumbar MRI did show some disc space collapse at L5-S1 and some lateral recess stenosis bilaterally.  Agree with the report.    Medical Decision Making:      I described and recommended a lumbar decompression and fusion with posterior lumbar interbody fusion (PLIF) and open transcortical pedicle screw/pete fixation using the [employer] robot all at L5/S1. The goal of surgery is relief of radiating leg pain, improvement of numbness, tingling, and weakness, and reduction in overall low back pain. The risks include, but are not limited to, infection, hemorrhage requiring transfusion or reoperation, CSF leak requiring reoperation, incomplete relief of symptoms, psuedoarthrosis resulting in chronic low back pain, hardward problems requiring revision or removal, potential need for additional surgery in the future, stroke, paralysis, coma, and death. The patient agrees to proceed.          Diagnoses and all orders for this visit:    1. History of spinal surgery (Primary)  -     HYDROcodone-acetaminophen (NORCO) 5-325 MG per tablet; Take 1 tablet by mouth Every 8 (Eight) Hours As Needed for Severe Pain .  Dispense: 30 tablet; Refill: 0  -     Case Request; Standing  -     ceFAZolin (ANCEF) 2 g in sodium chloride 0.9 % 100 mL IVPB  -     Case Request    2. Calf muscle weakness  -     HYDROcodone-acetaminophen (NORCO) 5-325 MG per tablet; Take 1 tablet by mouth Every 8 (Eight) Hours As Needed for Severe Pain .  Dispense: 30 tablet; Refill: 0  -     Case Request; Standing  -     ceFAZolin (ANCEF) 2 g in sodium chloride 0.9 % 100 mL IVPB  -     Case Request    3. Herniation of lumbar intervertebral disc with radiculopathy  -     HYDROcodone-acetaminophen (NORCO) 5-325 MG per  tablet; Take 1 tablet by mouth Every 8 (Eight) Hours As Needed for Severe Pain .  Dispense: 30 tablet; Refill: 0  -     Case Request; Standing  -     ceFAZolin (ANCEF) 2 g in sodium chloride 0.9 % 100 mL IVPB  -     Case Request    Other orders  -     Follow anesthesia standing orders.  -     Obtain informed consent  -     Provide NPO Instructions to Patient; Future  -     Chlorhexidine Skin Prep; Future  -     Follow anesthesia standing orders.; Standing  -     SCD (sequential compression device)- to be placed on patient in Pre-op; Standing  -     Verify / Perform Chlorhexidine Skin Prep; Standing      Return for 2 weeks after surgery with an APC.

## 2021-08-12 NOTE — PROGRESS NOTES
Subjective   Patient ID: Oksana Pillai is a 45 y.o. female is here today for follow-up.  She was last seen 8/11/21 for Degeneration og intervertebral disc at C5-C6 level, Spinal stenosis of lumbar region with neurogenic claudication, calf muscle weakness.  A medrol dose pack was ordered as well as imaging.    You have chosen to receive care through a telephone visit. Do you consent to use a telephone visit for your medical care today? Yes    Today patient reports to discuss surgery options    This was a televisit from the hospital lasting 14 minutes.  The patient was at home.  I saw her yesterday and she did have pretty severe weakness of 2/5 in the right calf.  I think it is predominantly due to lateral recess stenosis and S1 compression from disc base collapse.  But rather than delaying this I think it is better to move forward with a fusion surgery to decompress the nerve and help with the right leg pain and hopefully get her to improve in terms of her right calf weakness.  I told her that I think there is room for improvement but I cannot guarantee that will come back all the way obviously.  But if we do not do this soon she may end up with permanent weakness which is debilitating as far as her walking is even if the pain is better.  We will try and get this done next week on 8/20/2021.  The goals risks and benefits are described below.  She has a stop her Advil.  We will give her some pain medications to use in the interim.    History of Present Illness    The following portions of the patient's history were reviewed and updated as appropriate: allergies, current medications, past family history, past medical history, past social history, past surgical history and problem list.    Review of Systems        Objective     There were no vitals filed for this visit.  There is no height or weight on file to calculate BMI.      Physical Exam   Deferred  Neurologic Exam   Furred        Assessment/Plan   Independent Review  of Radiographic Studies:      I personally reviewed the images from the following studies.    I reviewed the cervical MRI done on 8/10/2021 which does show some mild disc bulging at C5-C6 and C6-C7 but nothing dramatic.  The sacral MRI was unremarkable.  The lumbar MRI did show some disc space collapse at L5-S1 and some lateral recess stenosis bilaterally.  Agree with the report.    Medical Decision Making:      I described and recommended a lumbar decompression and fusion with posterior lumbar interbody fusion (PLIF) and open transcortical pedicle screw/pete fixation using the [employer] robot all at L5/S1. The goal of surgery is relief of radiating leg pain, improvement of numbness, tingling, and weakness, and reduction in overall low back pain. The risks include, but are not limited to, infection, hemorrhage requiring transfusion or reoperation, CSF leak requiring reoperation, incomplete relief of symptoms, psuedoarthrosis resulting in chronic low back pain, hardward problems requiring revision or removal, potential need for additional surgery in the future, stroke, paralysis, coma, and death. The patient agrees to proceed.          Diagnoses and all orders for this visit:    1. History of spinal surgery (Primary)  -     HYDROcodone-acetaminophen (NORCO) 5-325 MG per tablet; Take 1 tablet by mouth Every 8 (Eight) Hours As Needed for Severe Pain .  Dispense: 30 tablet; Refill: 0  -     Case Request; Standing  -     ceFAZolin (ANCEF) 2 g in sodium chloride 0.9 % 100 mL IVPB  -     Case Request    2. Calf muscle weakness  -     HYDROcodone-acetaminophen (NORCO) 5-325 MG per tablet; Take 1 tablet by mouth Every 8 (Eight) Hours As Needed for Severe Pain .  Dispense: 30 tablet; Refill: 0  -     Case Request; Standing  -     ceFAZolin (ANCEF) 2 g in sodium chloride 0.9 % 100 mL IVPB  -     Case Request    3. Herniation of lumbar intervertebral disc with radiculopathy  -     HYDROcodone-acetaminophen (NORCO) 5-325 MG per  tablet; Take 1 tablet by mouth Every 8 (Eight) Hours As Needed for Severe Pain .  Dispense: 30 tablet; Refill: 0  -     Case Request; Standing  -     ceFAZolin (ANCEF) 2 g in sodium chloride 0.9 % 100 mL IVPB  -     Case Request    Other orders  -     Follow anesthesia standing orders.  -     Obtain informed consent  -     Provide NPO Instructions to Patient; Future  -     Chlorhexidine Skin Prep; Future  -     Follow anesthesia standing orders.; Standing  -     SCD (sequential compression device)- to be placed on patient in Pre-op; Standing  -     Verify / Perform Chlorhexidine Skin Prep; Standing      Return for 2 weeks after surgery with an APC.

## 2021-08-12 NOTE — TELEPHONE ENCOUNTER
----- Message from Brian Ojeda MD sent at 8/12/2021 10:24 AM EDT -----  yes  ----- Message -----  From: Jaci Nascimento RegSched Rep  Sent: 8/12/2021   8:19 AM EDT  To: Brian Ojeda MD    Do you want her to still the the CT/Xray ?  ----- Message -----  From: Brian Ojeda MD  Sent: 8/12/2021   7:50 AM EDT  To: Maria Elena Zayas MA, #    Could you put her on for a televisit this afternoon. Tell her after the visit yesterday, I concluded it's best to go ahead with the surgery due to the severe calf weakness.

## 2021-08-12 NOTE — TELEPHONE ENCOUNTER
----- Message from Brian Ojeda MD sent at 8/12/2021  7:50 AM EDT -----  Could you put her on for a televisit this afternoon. Tell her after the visit yesterday, I concluded it's best to go ahead with the surgery due to the severe calf weakness.

## 2021-08-18 ENCOUNTER — HOSPITAL ENCOUNTER (OUTPATIENT)
Dept: GENERAL RADIOLOGY | Facility: HOSPITAL | Age: 46
Discharge: HOME | End: 2021-08-18

## 2021-08-18 ENCOUNTER — HOSPITAL ENCOUNTER (OUTPATIENT)
Dept: CT IMAGING | Facility: HOSPITAL | Age: 46
Discharge: HOME | End: 2021-08-18

## 2021-08-18 ENCOUNTER — PRE-ADMISSION TESTING (OUTPATIENT)
Dept: PREADMISSION TESTING | Facility: HOSPITAL | Age: 46
End: 2021-08-18

## 2021-08-18 VITALS
WEIGHT: 206.9 LBS | HEIGHT: 67 IN | BODY MASS INDEX: 32.47 KG/M2 | SYSTOLIC BLOOD PRESSURE: 116 MMHG | RESPIRATION RATE: 16 BRPM | OXYGEN SATURATION: 99 % | HEART RATE: 57 BPM | TEMPERATURE: 99.5 F | DIASTOLIC BLOOD PRESSURE: 82 MMHG

## 2021-08-18 DIAGNOSIS — M48.062 SPINAL STENOSIS OF LUMBAR REGION WITH NEUROGENIC CLAUDICATION: ICD-10-CM

## 2021-08-18 DIAGNOSIS — M62.81 CALF MUSCLE WEAKNESS: ICD-10-CM

## 2021-08-18 LAB
ANION GAP SERPL CALCULATED.3IONS-SCNC: 8.6 MMOL/L (ref 5–15)
BUN SERPL-MCNC: 15 MG/DL (ref 6–20)
BUN/CREAT SERPL: 20.8 (ref 7–25)
CALCIUM SPEC-SCNC: 8.8 MG/DL (ref 8.6–10.5)
CHLORIDE SERPL-SCNC: 106 MMOL/L (ref 98–107)
CO2 SERPL-SCNC: 25.4 MMOL/L (ref 22–29)
CREAT SERPL-MCNC: 0.72 MG/DL (ref 0.57–1)
DEPRECATED RDW RBC AUTO: 42.9 FL (ref 37–54)
ERYTHROCYTE [DISTWIDTH] IN BLOOD BY AUTOMATED COUNT: 12.3 % (ref 12.3–15.4)
GFR SERPL CREATININE-BSD FRML MDRD: 88 ML/MIN/1.73
GLUCOSE SERPL-MCNC: 84 MG/DL (ref 65–99)
HCG SERPL QL: NEGATIVE
HCT VFR BLD AUTO: 39.1 % (ref 34–46.6)
HGB BLD-MCNC: 13.2 G/DL (ref 12–15.9)
MCH RBC QN AUTO: 32 PG (ref 26.6–33)
MCHC RBC AUTO-ENTMCNC: 33.8 G/DL (ref 31.5–35.7)
MCV RBC AUTO: 94.7 FL (ref 79–97)
PLATELET # BLD AUTO: 198 10*3/MM3 (ref 140–450)
PMV BLD AUTO: 11.4 FL (ref 6–12)
POTASSIUM SERPL-SCNC: 4 MMOL/L (ref 3.5–5.2)
RBC # BLD AUTO: 4.13 10*6/MM3 (ref 3.77–5.28)
SARS-COV-2 ORF1AB RESP QL NAA+PROBE: NOT DETECTED
SODIUM SERPL-SCNC: 140 MMOL/L (ref 136–145)
WBC # BLD AUTO: 6.27 10*3/MM3 (ref 3.4–10.8)

## 2021-08-18 PROCEDURE — C9803 HOPD COVID-19 SPEC COLLECT: HCPCS | Performed by: NURSE PRACTITIONER

## 2021-08-18 PROCEDURE — 72131 CT LUMBAR SPINE W/O DYE: CPT

## 2021-08-18 PROCEDURE — 80048 BASIC METABOLIC PNL TOTAL CA: CPT

## 2021-08-18 PROCEDURE — 36415 COLL VENOUS BLD VENIPUNCTURE: CPT

## 2021-08-18 PROCEDURE — 84703 CHORIONIC GONADOTROPIN ASSAY: CPT

## 2021-08-18 PROCEDURE — 72114 X-RAY EXAM L-S SPINE BENDING: CPT

## 2021-08-18 PROCEDURE — U0004 COV-19 TEST NON-CDC HGH THRU: HCPCS | Performed by: NURSE PRACTITIONER

## 2021-08-18 PROCEDURE — 85027 COMPLETE CBC AUTOMATED: CPT

## 2021-08-18 RX ORDER — CHLORHEXIDINE GLUCONATE 500 MG/1
1 CLOTH TOPICAL TAKE AS DIRECTED
COMMUNITY
End: 2021-08-23 | Stop reason: HOSPADM

## 2021-08-18 NOTE — DISCHARGE INSTRUCTIONS
Take the following medications the morning of surgery:  SERTRALINE, HYDROCODONE IF NEEDED      Arrive to hospital on your day of surgery at 6:00 AM.      If you are on prescription narcotic pain medication to control your pain you may also take that medication the morning of surgery.    General Instructions:  • Do not eat solid food after midnight the night before surgery.  • You may drink clear liquids day of surgery but must stop at least one hour before your hospital arrival time.  • It is beneficial for you to have a clear drink that contains carbohydrates the day of surgery.  We suggest a 12 to 20 ounce bottle of Gatorade or Powerade for non-diabetic patients or a 12 to 20 ounce bottle of G2 or Powerade Zero for diabetic patients. (Pediatric patients, are not advised to drink a 12 to 20 ounce carbohydrate drink)    Clear liquids are liquids you can see through.  Nothing red in color.     Plain water                               Sports drinks  Sodas                                   Gelatin (Jell-O)  Fruit juices without pulp such as white grape juice and apple juice  Popsicles that contain no fruit or yogurt  Tea or coffee (no cream or milk added)  Gatorade / Powerade  G2 / Powerade Zero    • Infants may have breast milk up to four hours before surgery.  • Infants drinking formula may drink formula up to six hours before surgery.   • Patients who avoid smoking, chewing tobacco and alcohol for 4 weeks prior to surgery have a reduced risk of post-operative complications.  Quit smoking as many days before surgery as you can.  • Do not smoke, use chewing tobacco or drink alcohol the day of surgery.   • If applicable bring your C-PAP/ BI-PAP machine.  • Bring any papers given to you in the doctor’s office.  • Wear clean comfortable clothes.  • Do not wear contact lenses, false eyelashes or make-up.  Bring a case for your glasses.   • Bring crutches or walker if applicable.  • Remove all piercings.  Leave jewelry  and any other valuables at home.  • Hair extensions with metal clips must be removed prior to surgery.  • The Pre-Admission Testing nurse will instruct you to bring medications if unable to obtain an accurate list in Pre-Admission Testing.        If you were given a blood bank ID arm band remember to bring it with you the day of surgery.    Preventing a Surgical Site Infection:  • For 2 to 3 days before surgery, avoid shaving with a razor because the razor can irritate skin and make it easier to develop an infection.    • Any areas of open skin can increase the risk of a post-operative wound infection by allowing bacteria to enter and travel throughout the body.  Notify your surgeon if you have any skin wounds / rashes even if it is not near the expected surgical site.  The area will need assessed to determine if surgery should be delayed until it is healed.  • The night prior to surgery shower using a fresh bar of anti-bacterial soap (such as Dial) and clean washcloth.  Sleep in a clean bed with clean clothing.  Do not allow pets to sleep with you.  • Shower on the morning of surgery using a fresh bar of anti-bacterial soap (such as Dial) and clean washcloth.  Dry with a clean towel and dress in clean clothing.  • Ask your surgeon if you will be receiving antibiotics prior to surgery.  • Make sure you, your family, and all healthcare providers clean their hands with soap and water or an alcohol based hand  before caring for you or your wound.    Day of surgery:  Your arrival time is approximately two hours before your scheduled surgery time.  Upon arrival, a Pre-op nurse and Anesthesiologist will review your health history, obtain vital signs, and answer questions you may have.  The only belongings needed at this time will be a list of your home medications and if applicable your C-PAP/BI-PAP machine.  A Pre-op nurse will start an IV and you may receive medication in preparation for surgery, including  something to help you relax.     Please be aware that surgery does come with discomfort.  We want to make every effort to control your discomfort so please discuss any uncontrolled symptoms with your nurse.   Your doctor will most likely have prescribed pain medications.      If you are going home after surgery you will receive individualized written care instructions before being discharged.  A responsible adult must drive you to and from the hospital on the day of your surgery and stay with you for 24 hours.  Discharge prescriptions can be filled by the hospital pharmacy during regular pharmacy hours.  If you are having surgery late in the day/evening your prescription may be e-prescribed to your pharmacy.  Please verify your pharmacy hours or chose a 24 hour pharmacy to avoid not having access to your prescription because your pharmacy has closed for the day.    If you are staying overnight following surgery, you will be transported to your hospital room following the recovery period.  [facility] has all private rooms.    If you have any questions please call Pre-Admission Testing at [phone].  Deductibles and co-payments are collected on the day of service. Please be prepared to pay the required co-pay, deductible or deposit on the day of service as defined by your plan.    Patient Education for Self-Quarantine Process    • Following your COVID testing, we strongly recommend that you wear a mask when you are with other people and practice social distancing.   • Limit your activities to only required outings.  • Wash your hands with soap and water frequently for at least 20 seconds.   • Avoid touching your eyes, nose and mouth with unwashed hands.  • Do not share anything - utensils, drinking glasses, food from the same bowl.   • Sanitize household surfaces daily. Include all high touch areas (door handles, light switches, phones, countertops, etc.)    Call your surgeon immediately if you  experience any of the following symptoms:  • Sore Throat  • Shortness of Breath or difficulty breathing  • Cough  • Chills  • Body soreness or muscle pain  • Headache  • Fever  • New loss of taste or smell  • Do not arrive for your surgery ill.  Your procedure will need to be rescheduled to another time.  You will need to call your physician before the day of surgery to avoid any unnecessary exposure to hospital staff as well as other patients.    CHLORHEXIDINE CLOTH INSTRUCTIONS  The morning of surgery follow these instructions using the Chlorhexidine cloths you've been given.  These steps reduce bacteria on the body.  Do not use the cloths near your eyes, ears mouth, genitalia or on open wounds.  Throw the cloths away after use but do not try to flush them down a toilet.      • Open and remove one cloth at a time from the package.    • Leave the cloth unfolded and begin the bathing.  • Massage the skin with the cloths using gentle pressure to remove bacteria.  Do not scrub harshly.   • Follow the steps below with one 2% CHG cloth per area (6 total cloths).  • One cloth for neck, shoulders and chest.  • One cloth for both arms, hands, fingers and underarms (do underarms last).  • One cloth for the abdomen followed by groin.  • One cloth for right leg and foot including between the toes.  • One cloth for left leg and foot including between the toes.  • The last cloth is to be used for the back of the neck, back and buttocks.    Allow the CHG to air dry 3 minutes on the skin which will give it time to work and decrease the chance of irritation.  The skin may feel sticky until it is dry.  Do not rinse with water or any other liquid or you will lose the beneficial effects of the CHG.  If mild skin irritation occurs, do rinse the skin to remove the CHG.  Report this to the nurse at time of admission.  Do not apply lotions, creams, ointments, deodorants or perfumes after using the clothes. Dress in clean clothes before  coming to the hospital.

## 2021-08-20 ENCOUNTER — ANESTHESIA EVENT (OUTPATIENT)
Dept: PERIOP | Facility: HOSPITAL | Age: 46
End: 2021-08-20

## 2021-08-20 ENCOUNTER — HOSPITAL ENCOUNTER (INPATIENT)
Facility: HOSPITAL | Age: 46
LOS: 3 days | Discharge: HOME HEALTH | End: 2021-08-23
Attending: NEUROLOGICAL SURGERY | Admitting: NEUROLOGICAL SURGERY

## 2021-08-20 ENCOUNTER — ANESTHESIA (OUTPATIENT)
Dept: PERIOP | Facility: HOSPITAL | Age: 46
End: 2021-08-20

## 2021-08-20 ENCOUNTER — APPOINTMENT (OUTPATIENT)
Dept: GENERAL RADIOLOGY | Facility: HOSPITAL | Age: 46
End: 2021-08-20

## 2021-08-20 DIAGNOSIS — M43.06 LUMBAR SPONDYLOLYSIS: ICD-10-CM

## 2021-08-20 DIAGNOSIS — M48.062 SPINAL STENOSIS OF LUMBAR REGION WITH NEUROGENIC CLAUDICATION: ICD-10-CM

## 2021-08-20 DIAGNOSIS — M51.16 HERNIATION OF LUMBAR INTERVERTEBRAL DISC WITH RADICULOPATHY: ICD-10-CM

## 2021-08-20 DIAGNOSIS — M51.26 HERNIATED LUMBAR INTERVERTEBRAL DISC: Primary | ICD-10-CM

## 2021-08-20 DIAGNOSIS — G89.29 CHRONIC BILATERAL LOW BACK PAIN WITH RIGHT-SIDED SCIATICA: ICD-10-CM

## 2021-08-20 DIAGNOSIS — M51.36 DDD (DEGENERATIVE DISC DISEASE), LUMBAR: ICD-10-CM

## 2021-08-20 DIAGNOSIS — M51.17 INTERVERTEBRAL DISC DISORDER WITH RADICULOPATHY OF LUMBOSACRAL REGION: ICD-10-CM

## 2021-08-20 DIAGNOSIS — M62.81 CALF MUSCLE WEAKNESS: ICD-10-CM

## 2021-08-20 DIAGNOSIS — Z98.890 HISTORY OF SPINAL SURGERY: ICD-10-CM

## 2021-08-20 DIAGNOSIS — M54.41 CHRONIC BILATERAL LOW BACK PAIN WITH RIGHT-SIDED SCIATICA: ICD-10-CM

## 2021-08-20 PROCEDURE — C1713 ANCHOR/SCREW BN/BN,TIS/BN: HCPCS | Performed by: NEUROLOGICAL SURGERY

## 2021-08-20 PROCEDURE — 25010000003 HYDROMORPHONE HCL PF 50 MG/5ML SOLUTION: Performed by: NEUROLOGICAL SURGERY

## 2021-08-20 PROCEDURE — 25010000003 CEFAZOLIN IN DEXTROSE 2-4 GM/100ML-% SOLUTION: Performed by: NEUROLOGICAL SURGERY

## 2021-08-20 PROCEDURE — 22853 INSJ BIOMECHANICAL DEVICE: CPT | Performed by: SPECIALIST/TECHNOLOGIST, OTHER

## 2021-08-20 PROCEDURE — 0SG30AJ FUSION OF LUMBOSACRAL JOINT WITH INTERBODY FUSION DEVICE, POSTERIOR APPROACH, ANTERIOR COLUMN, OPEN APPROACH: ICD-10-PCS | Performed by: NEUROLOGICAL SURGERY

## 2021-08-20 PROCEDURE — 25010000002 MIDAZOLAM PER 1 MG: Performed by: ANESTHESIOLOGY

## 2021-08-20 PROCEDURE — 25010000002 CEFAZOLIN PER 500 MG: Performed by: NURSE ANESTHETIST, CERTIFIED REGISTERED

## 2021-08-20 PROCEDURE — 25010000002 DEXAMETHASONE PER 1 MG: Performed by: NURSE ANESTHETIST, CERTIFIED REGISTERED

## 2021-08-20 PROCEDURE — 22853 INSJ BIOMECHANICAL DEVICE: CPT | Performed by: NEUROLOGICAL SURGERY

## 2021-08-20 PROCEDURE — C1889 IMPLANT/INSERT DEVICE, NOC: HCPCS | Performed by: NEUROLOGICAL SURGERY

## 2021-08-20 PROCEDURE — 25010000002 HYDROMORPHONE PER 4 MG: Performed by: NURSE ANESTHETIST, CERTIFIED REGISTERED

## 2021-08-20 PROCEDURE — 22630 ARTHRD PST TQ 1NTRSPC LUM: CPT | Performed by: NEUROLOGICAL SURGERY

## 2021-08-20 PROCEDURE — 25010000002 ONDANSETRON PER 1 MG: Performed by: NURSE ANESTHETIST, CERTIFIED REGISTERED

## 2021-08-20 PROCEDURE — 25010000002 NEOSTIGMINE 5 MG/10ML SOLUTION: Performed by: NURSE ANESTHETIST, CERTIFIED REGISTERED

## 2021-08-20 PROCEDURE — 25010000002 HEPARIN (PORCINE) PER 1000 UNITS: Performed by: NEUROLOGICAL SURGERY

## 2021-08-20 PROCEDURE — 0ST40ZZ RESECTION OF LUMBOSACRAL DISC, OPEN APPROACH: ICD-10-PCS | Performed by: NEUROLOGICAL SURGERY

## 2021-08-20 PROCEDURE — 22630 ARTHRD PST TQ 1NTRSPC LUM: CPT | Performed by: SPECIALIST/TECHNOLOGIST, OTHER

## 2021-08-20 PROCEDURE — 25010000002 FENTANYL CITRATE (PF) 50 MCG/ML SOLUTION: Performed by: NURSE ANESTHETIST, CERTIFIED REGISTERED

## 2021-08-20 PROCEDURE — 72100 X-RAY EXAM L-S SPINE 2/3 VWS: CPT

## 2021-08-20 PROCEDURE — 25010000002 METHOCARBAMOL 1000 MG/10ML SOLUTION: Performed by: NEUROLOGICAL SURGERY

## 2021-08-20 PROCEDURE — 25010000002 PROPOFOL 10 MG/ML EMULSION: Performed by: NURSE ANESTHETIST, CERTIFIED REGISTERED

## 2021-08-20 PROCEDURE — 25010000003 CEFAZOLIN PER 500 MG: Performed by: NEUROLOGICAL SURGERY

## 2021-08-20 PROCEDURE — 76000 FLUOROSCOPY <1 HR PHYS/QHP: CPT

## 2021-08-20 PROCEDURE — 22840 INSERT SPINE FIXATION DEVICE: CPT | Performed by: SPECIALIST/TECHNOLOGIST, OTHER

## 2021-08-20 PROCEDURE — 22840 INSERT SPINE FIXATION DEVICE: CPT | Performed by: NEUROLOGICAL SURGERY

## 2021-08-20 DEVICE — ROD 1475501035 4.75 CCM NS CURV 35MM
Type: IMPLANTABLE DEVICE | Site: SPINE LUMBAR | Status: FUNCTIONAL
Brand: CD HORIZON® SPINAL SYSTEM

## 2021-08-20 DEVICE — SSC BONE WAX
Type: IMPLANTABLE DEVICE | Site: SPINE LUMBAR | Status: FUNCTIONAL
Brand: SSC BONE WAX

## 2021-08-20 DEVICE — SPACER 84332410 ADAPTIX 24MM X 10MM
Type: IMPLANTABLE DEVICE | Site: SPINE LUMBAR | Status: FUNCTIONAL
Brand: ADAPTIX™ INTERBODY SYSTEM WITH TITAN NANOLOCK™ SURFACE TECHNOLOGY

## 2021-08-20 DEVICE — PUTTY DBM GRAFTON 6CC: Type: IMPLANTABLE DEVICE | Site: SPINE LUMBAR | Status: FUNCTIONAL

## 2021-08-20 DEVICE — FLOSEAL HEMOSTATIC MATRIX, 10ML
Type: IMPLANTABLE DEVICE | Site: SPINE LUMBAR | Status: FUNCTIONAL
Brand: FLOSEAL HEMOSTATIC MATRIX

## 2021-08-20 RX ORDER — CEFAZOLIN SODIUM 2 G/100ML
2 INJECTION, SOLUTION INTRAVENOUS EVERY 8 HOURS
Status: COMPLETED | OUTPATIENT
Start: 2021-08-20 | End: 2021-08-21

## 2021-08-20 RX ORDER — SODIUM CHLORIDE 9 MG/ML
INJECTION, SOLUTION INTRAVENOUS AS NEEDED
Status: DISCONTINUED | OUTPATIENT
Start: 2021-08-20 | End: 2021-08-20 | Stop reason: HOSPADM

## 2021-08-20 RX ORDER — NALOXONE HCL 0.4 MG/ML
0.2 VIAL (ML) INJECTION AS NEEDED
Status: DISCONTINUED | OUTPATIENT
Start: 2021-08-20 | End: 2021-08-20 | Stop reason: HOSPADM

## 2021-08-20 RX ORDER — SODIUM CHLORIDE, SODIUM LACTATE, POTASSIUM CHLORIDE, CALCIUM CHLORIDE 600; 310; 30; 20 MG/100ML; MG/100ML; MG/100ML; MG/100ML
50 INJECTION, SOLUTION INTRAVENOUS CONTINUOUS
Status: DISCONTINUED | OUTPATIENT
Start: 2021-08-20 | End: 2021-08-22

## 2021-08-20 RX ORDER — ONDANSETRON 4 MG/1
4 TABLET, FILM COATED ORAL EVERY 6 HOURS PRN
Status: DISCONTINUED | OUTPATIENT
Start: 2021-08-20 | End: 2021-08-23 | Stop reason: HOSPADM

## 2021-08-20 RX ORDER — LABETALOL HYDROCHLORIDE 5 MG/ML
5 INJECTION, SOLUTION INTRAVENOUS
Status: DISCONTINUED | OUTPATIENT
Start: 2021-08-20 | End: 2021-08-20 | Stop reason: HOSPADM

## 2021-08-20 RX ORDER — DIPHENHYDRAMINE HCL 25 MG
25 CAPSULE ORAL
Status: DISCONTINUED | OUTPATIENT
Start: 2021-08-20 | End: 2021-08-20 | Stop reason: HOSPADM

## 2021-08-20 RX ORDER — EPHEDRINE SULFATE 50 MG/ML
5 INJECTION, SOLUTION INTRAVENOUS ONCE AS NEEDED
Status: DISCONTINUED | OUTPATIENT
Start: 2021-08-20 | End: 2021-08-20 | Stop reason: HOSPADM

## 2021-08-20 RX ORDER — DOCUSATE SODIUM 100 MG/1
100 CAPSULE, LIQUID FILLED ORAL 2 TIMES DAILY PRN
Status: DISCONTINUED | OUTPATIENT
Start: 2021-08-20 | End: 2021-08-21

## 2021-08-20 RX ORDER — SODIUM CHLORIDE 0.9 % (FLUSH) 0.9 %
10 SYRINGE (ML) INJECTION AS NEEDED
Status: DISCONTINUED | OUTPATIENT
Start: 2021-08-20 | End: 2021-08-23 | Stop reason: HOSPADM

## 2021-08-20 RX ORDER — FENTANYL CITRATE 50 UG/ML
INJECTION, SOLUTION INTRAMUSCULAR; INTRAVENOUS AS NEEDED
Status: DISCONTINUED | OUTPATIENT
Start: 2021-08-20 | End: 2021-08-20 | Stop reason: SURG

## 2021-08-20 RX ORDER — CEFAZOLIN SODIUM 2 G/100ML
2 INJECTION, SOLUTION INTRAVENOUS ONCE
Status: COMPLETED | OUTPATIENT
Start: 2021-08-20 | End: 2021-08-20

## 2021-08-20 RX ORDER — GLYCOPYRROLATE 0.2 MG/ML
INJECTION INTRAMUSCULAR; INTRAVENOUS AS NEEDED
Status: DISCONTINUED | OUTPATIENT
Start: 2021-08-20 | End: 2021-08-20 | Stop reason: SURG

## 2021-08-20 RX ORDER — ONDANSETRON 2 MG/ML
4 INJECTION INTRAMUSCULAR; INTRAVENOUS EVERY 6 HOURS PRN
Status: DISCONTINUED | OUTPATIENT
Start: 2021-08-20 | End: 2021-08-23 | Stop reason: HOSPADM

## 2021-08-20 RX ORDER — AMOXICILLIN 250 MG
1 CAPSULE ORAL NIGHTLY PRN
Status: DISCONTINUED | OUTPATIENT
Start: 2021-08-20 | End: 2021-08-21

## 2021-08-20 RX ORDER — DIPHENHYDRAMINE HYDROCHLORIDE 50 MG/ML
12.5 INJECTION INTRAMUSCULAR; INTRAVENOUS
Status: DISCONTINUED | OUTPATIENT
Start: 2021-08-20 | End: 2021-08-20 | Stop reason: HOSPADM

## 2021-08-20 RX ORDER — ONDANSETRON 2 MG/ML
4 INJECTION INTRAMUSCULAR; INTRAVENOUS ONCE AS NEEDED
Status: DISCONTINUED | OUTPATIENT
Start: 2021-08-20 | End: 2021-08-20 | Stop reason: HOSPADM

## 2021-08-20 RX ORDER — NALOXONE HCL 0.4 MG/ML
0.1 VIAL (ML) INJECTION
Status: DISCONTINUED | OUTPATIENT
Start: 2021-08-20 | End: 2021-08-23 | Stop reason: HOSPADM

## 2021-08-20 RX ORDER — CETIRIZINE HYDROCHLORIDE 10 MG/1
10 TABLET ORAL DAILY
Status: DISCONTINUED | OUTPATIENT
Start: 2021-08-20 | End: 2021-08-23 | Stop reason: HOSPADM

## 2021-08-20 RX ORDER — EPHEDRINE SULFATE 50 MG/ML
INJECTION, SOLUTION INTRAVENOUS AS NEEDED
Status: DISCONTINUED | OUTPATIENT
Start: 2021-08-20 | End: 2021-08-20 | Stop reason: SURG

## 2021-08-20 RX ORDER — DEXAMETHASONE SODIUM PHOSPHATE 10 MG/ML
INJECTION INTRAMUSCULAR; INTRAVENOUS AS NEEDED
Status: DISCONTINUED | OUTPATIENT
Start: 2021-08-20 | End: 2021-08-20 | Stop reason: SURG

## 2021-08-20 RX ORDER — PROPOFOL 10 MG/ML
VIAL (ML) INTRAVENOUS AS NEEDED
Status: DISCONTINUED | OUTPATIENT
Start: 2021-08-20 | End: 2021-08-20 | Stop reason: SURG

## 2021-08-20 RX ORDER — LIDOCAINE HYDROCHLORIDE 10 MG/ML
0.5 INJECTION, SOLUTION EPIDURAL; INFILTRATION; INTRACAUDAL; PERINEURAL ONCE AS NEEDED
Status: DISCONTINUED | OUTPATIENT
Start: 2021-08-20 | End: 2021-08-20 | Stop reason: HOSPADM

## 2021-08-20 RX ORDER — CEFAZOLIN SODIUM 500 MG/2.2ML
INJECTION, POWDER, FOR SOLUTION INTRAMUSCULAR; INTRAVENOUS AS NEEDED
Status: DISCONTINUED | OUTPATIENT
Start: 2021-08-20 | End: 2021-08-20 | Stop reason: SURG

## 2021-08-20 RX ORDER — CEFAZOLIN SODIUM 2 G/100ML
2 INJECTION, SOLUTION INTRAVENOUS ONCE
Status: DISCONTINUED | OUTPATIENT
Start: 2021-08-20 | End: 2021-08-20

## 2021-08-20 RX ORDER — FAMOTIDINE 10 MG/ML
20 INJECTION, SOLUTION INTRAVENOUS ONCE
Status: COMPLETED | OUTPATIENT
Start: 2021-08-20 | End: 2021-08-20

## 2021-08-20 RX ORDER — ROCURONIUM BROMIDE 10 MG/ML
INJECTION, SOLUTION INTRAVENOUS AS NEEDED
Status: DISCONTINUED | OUTPATIENT
Start: 2021-08-20 | End: 2021-08-20 | Stop reason: SURG

## 2021-08-20 RX ORDER — ACETAMINOPHEN 325 MG/1
650 TABLET ORAL EVERY 4 HOURS PRN
Status: DISCONTINUED | OUTPATIENT
Start: 2021-08-20 | End: 2021-08-23 | Stop reason: HOSPADM

## 2021-08-20 RX ORDER — HYDROCODONE BITARTRATE AND ACETAMINOPHEN 7.5; 325 MG/1; MG/1
1 TABLET ORAL EVERY 4 HOURS PRN
Status: DISCONTINUED | OUTPATIENT
Start: 2021-08-20 | End: 2021-08-23 | Stop reason: HOSPADM

## 2021-08-20 RX ORDER — PROMETHAZINE HYDROCHLORIDE 25 MG/1
25 SUPPOSITORY RECTAL ONCE AS NEEDED
Status: DISCONTINUED | OUTPATIENT
Start: 2021-08-20 | End: 2021-08-20 | Stop reason: HOSPADM

## 2021-08-20 RX ORDER — MIDAZOLAM HYDROCHLORIDE 1 MG/ML
1 INJECTION INTRAMUSCULAR; INTRAVENOUS
Status: DISCONTINUED | OUTPATIENT
Start: 2021-08-20 | End: 2021-08-20 | Stop reason: HOSPADM

## 2021-08-20 RX ORDER — ONDANSETRON 2 MG/ML
INJECTION INTRAMUSCULAR; INTRAVENOUS AS NEEDED
Status: DISCONTINUED | OUTPATIENT
Start: 2021-08-20 | End: 2021-08-20 | Stop reason: SURG

## 2021-08-20 RX ORDER — FENTANYL CITRATE 50 UG/ML
50 INJECTION, SOLUTION INTRAMUSCULAR; INTRAVENOUS
Status: DISCONTINUED | OUTPATIENT
Start: 2021-08-20 | End: 2021-08-20 | Stop reason: HOSPADM

## 2021-08-20 RX ORDER — BUPIVACAINE HYDROCHLORIDE AND EPINEPHRINE 2.5; 5 MG/ML; UG/ML
INJECTION, SOLUTION EPIDURAL; INFILTRATION; INTRACAUDAL; PERINEURAL AS NEEDED
Status: DISCONTINUED | OUTPATIENT
Start: 2021-08-20 | End: 2021-08-20 | Stop reason: HOSPADM

## 2021-08-20 RX ORDER — LIDOCAINE HYDROCHLORIDE 20 MG/ML
INJECTION, SOLUTION INFILTRATION; PERINEURAL AS NEEDED
Status: DISCONTINUED | OUTPATIENT
Start: 2021-08-20 | End: 2021-08-20 | Stop reason: SURG

## 2021-08-20 RX ORDER — SODIUM CHLORIDE 0.9 % (FLUSH) 0.9 %
3-10 SYRINGE (ML) INJECTION AS NEEDED
Status: DISCONTINUED | OUTPATIENT
Start: 2021-08-20 | End: 2021-08-20 | Stop reason: HOSPADM

## 2021-08-20 RX ORDER — DIAZEPAM 5 MG/1
5 TABLET ORAL EVERY 6 HOURS PRN
Status: DISCONTINUED | OUTPATIENT
Start: 2021-08-20 | End: 2021-08-21

## 2021-08-20 RX ORDER — FLUMAZENIL 0.1 MG/ML
0.2 INJECTION INTRAVENOUS AS NEEDED
Status: DISCONTINUED | OUTPATIENT
Start: 2021-08-20 | End: 2021-08-20 | Stop reason: HOSPADM

## 2021-08-20 RX ORDER — HYDROMORPHONE HYDROCHLORIDE 1 MG/ML
0.5 INJECTION, SOLUTION INTRAMUSCULAR; INTRAVENOUS; SUBCUTANEOUS
Status: DISCONTINUED | OUTPATIENT
Start: 2021-08-20 | End: 2021-08-20 | Stop reason: HOSPADM

## 2021-08-20 RX ORDER — FLUTICASONE PROPIONATE 50 MCG
2 SPRAY, SUSPENSION (ML) NASAL 2 TIMES DAILY
Status: DISCONTINUED | OUTPATIENT
Start: 2021-08-20 | End: 2021-08-23 | Stop reason: HOSPADM

## 2021-08-20 RX ORDER — WOUND DRESSING ADHESIVE - LIQUID
LIQUID MISCELLANEOUS AS NEEDED
Status: DISCONTINUED | OUTPATIENT
Start: 2021-08-20 | End: 2021-08-20 | Stop reason: HOSPADM

## 2021-08-20 RX ORDER — METHOCARBAMOL 100 MG/ML
1000 INJECTION, SOLUTION INTRAMUSCULAR; INTRAVENOUS ONCE
Status: COMPLETED | OUTPATIENT
Start: 2021-08-20 | End: 2021-08-20

## 2021-08-20 RX ORDER — SODIUM CHLORIDE, SODIUM LACTATE, POTASSIUM CHLORIDE, CALCIUM CHLORIDE 600; 310; 30; 20 MG/100ML; MG/100ML; MG/100ML; MG/100ML
9 INJECTION, SOLUTION INTRAVENOUS CONTINUOUS
Status: DISCONTINUED | OUTPATIENT
Start: 2021-08-20 | End: 2021-08-20

## 2021-08-20 RX ORDER — PROMETHAZINE HYDROCHLORIDE 25 MG/1
25 TABLET ORAL ONCE AS NEEDED
Status: DISCONTINUED | OUTPATIENT
Start: 2021-08-20 | End: 2021-08-20 | Stop reason: HOSPADM

## 2021-08-20 RX ORDER — SODIUM CHLORIDE 0.9 % (FLUSH) 0.9 %
3 SYRINGE (ML) INJECTION EVERY 12 HOURS SCHEDULED
Status: DISCONTINUED | OUTPATIENT
Start: 2021-08-20 | End: 2021-08-23 | Stop reason: HOSPADM

## 2021-08-20 RX ORDER — NEOSTIGMINE METHYLSULFATE 0.5 MG/ML
INJECTION, SOLUTION INTRAVENOUS AS NEEDED
Status: DISCONTINUED | OUTPATIENT
Start: 2021-08-20 | End: 2021-08-20 | Stop reason: SURG

## 2021-08-20 RX ORDER — SODIUM CHLORIDE 0.9 % (FLUSH) 0.9 %
3 SYRINGE (ML) INJECTION EVERY 12 HOURS SCHEDULED
Status: DISCONTINUED | OUTPATIENT
Start: 2021-08-20 | End: 2021-08-20 | Stop reason: HOSPADM

## 2021-08-20 RX ORDER — HYDROMORPHONE HCL IN 0.9% NACL 10 MG/50ML
PATIENT CONTROLLED ANALGESIA SYRINGE INTRAVENOUS CONTINUOUS
Status: DISCONTINUED | OUTPATIENT
Start: 2021-08-20 | End: 2021-08-22

## 2021-08-20 RX ORDER — CYCLOBENZAPRINE HCL 10 MG
10 TABLET ORAL 3 TIMES DAILY PRN
Status: DISCONTINUED | OUTPATIENT
Start: 2021-08-20 | End: 2021-08-21

## 2021-08-20 RX ADMIN — FENTANYL CITRATE 50 MCG: 50 INJECTION INTRAMUSCULAR; INTRAVENOUS at 09:15

## 2021-08-20 RX ADMIN — HYDROMORPHONE HYDROCHLORIDE 0.5 MG: 1 INJECTION, SOLUTION INTRAMUSCULAR; INTRAVENOUS; SUBCUTANEOUS at 13:43

## 2021-08-20 RX ADMIN — FENTANYL CITRATE 50 MCG: 50 INJECTION, SOLUTION INTRAMUSCULAR; INTRAVENOUS at 13:15

## 2021-08-20 RX ADMIN — HYDROMORPHONE HYDROCHLORIDE 0.5 MG: 1 INJECTION, SOLUTION INTRAMUSCULAR; INTRAVENOUS; SUBCUTANEOUS at 13:16

## 2021-08-20 RX ADMIN — HYDROMORPHONE HYDROCHLORIDE 0.5 MG: 1 INJECTION, SOLUTION INTRAMUSCULAR; INTRAVENOUS; SUBCUTANEOUS at 14:05

## 2021-08-20 RX ADMIN — SODIUM CHLORIDE, POTASSIUM CHLORIDE, SODIUM LACTATE AND CALCIUM CHLORIDE: 600; 310; 30; 20 INJECTION, SOLUTION INTRAVENOUS at 13:02

## 2021-08-20 RX ADMIN — EPHEDRINE SULFATE 10 MG: 50 INJECTION INTRAVENOUS at 10:22

## 2021-08-20 RX ADMIN — NEOSTIGMINE METHYLSULFATE 3 MG: 0.5 INJECTION INTRAVENOUS at 12:40

## 2021-08-20 RX ADMIN — FENTANYL CITRATE 50 MCG: 50 INJECTION INTRAMUSCULAR; INTRAVENOUS at 12:42

## 2021-08-20 RX ADMIN — DIAZEPAM 5 MG: 5 TABLET ORAL at 16:38

## 2021-08-20 RX ADMIN — PROPOFOL 150 MG: 10 INJECTION, EMULSION INTRAVENOUS at 08:25

## 2021-08-20 RX ADMIN — METHOCARBAMOL 1000 MG: 100 INJECTION INTRAMUSCULAR; INTRAVENOUS at 14:40

## 2021-08-20 RX ADMIN — EPHEDRINE SULFATE 10 MG: 50 INJECTION INTRAVENOUS at 10:21

## 2021-08-20 RX ADMIN — FENTANYL CITRATE 100 MCG: 50 INJECTION INTRAMUSCULAR; INTRAVENOUS at 08:25

## 2021-08-20 RX ADMIN — FAMOTIDINE 20 MG: 10 INJECTION INTRAVENOUS at 08:04

## 2021-08-20 RX ADMIN — ROCURONIUM BROMIDE 20 MG: 50 INJECTION INTRAVENOUS at 10:02

## 2021-08-20 RX ADMIN — CEFAZOLIN 2 G: 225 INJECTION, POWDER, FOR SOLUTION INTRAMUSCULAR; INTRAVENOUS at 12:25

## 2021-08-20 RX ADMIN — FENTANYL CITRATE 50 MCG: 50 INJECTION, SOLUTION INTRAMUSCULAR; INTRAVENOUS at 13:27

## 2021-08-20 RX ADMIN — ROCURONIUM BROMIDE 20 MG: 50 INJECTION INTRAVENOUS at 09:05

## 2021-08-20 RX ADMIN — CYCLOBENZAPRINE 10 MG: 10 TABLET, FILM COATED ORAL at 13:44

## 2021-08-20 RX ADMIN — MIDAZOLAM 1 MG: 1 INJECTION INTRAMUSCULAR; INTRAVENOUS at 08:04

## 2021-08-20 RX ADMIN — HYDROMORPHONE HYDROCHLORIDE: 10 INJECTION, SOLUTION INTRAMUSCULAR; INTRAVENOUS; SUBCUTANEOUS at 13:23

## 2021-08-20 RX ADMIN — SODIUM CHLORIDE, POTASSIUM CHLORIDE, SODIUM LACTATE AND CALCIUM CHLORIDE: 600; 310; 30; 20 INJECTION, SOLUTION INTRAVENOUS at 10:55

## 2021-08-20 RX ADMIN — CETIRIZINE HYDROCHLORIDE 10 MG: 10 TABLET ORAL at 20:40

## 2021-08-20 RX ADMIN — DEXAMETHASONE SODIUM PHOSPHATE 10 MG: 10 INJECTION INTRAMUSCULAR; INTRAVENOUS at 09:07

## 2021-08-20 RX ADMIN — FENTANYL CITRATE 50 MCG: 50 INJECTION INTRAMUSCULAR; INTRAVENOUS at 12:21

## 2021-08-20 RX ADMIN — HYDROCODONE BITARTRATE AND ACETAMINOPHEN 1 TABLET: 7.5; 325 TABLET ORAL at 23:04

## 2021-08-20 RX ADMIN — CEFAZOLIN SODIUM 2 G: 2 INJECTION, SOLUTION INTRAVENOUS at 20:40

## 2021-08-20 RX ADMIN — CEFAZOLIN SODIUM 2 G: 2 INJECTION, SOLUTION INTRAVENOUS at 08:14

## 2021-08-20 RX ADMIN — ONDANSETRON 4 MG: 2 INJECTION INTRAMUSCULAR; INTRAVENOUS at 12:00

## 2021-08-20 RX ADMIN — SODIUM CHLORIDE, POTASSIUM CHLORIDE, SODIUM LACTATE AND CALCIUM CHLORIDE 9 ML/HR: 600; 310; 30; 20 INJECTION, SOLUTION INTRAVENOUS at 08:04

## 2021-08-20 RX ADMIN — ROCURONIUM BROMIDE 40 MG: 50 INJECTION INTRAVENOUS at 08:25

## 2021-08-20 RX ADMIN — HYDROCODONE BITARTRATE AND ACETAMINOPHEN 1 TABLET: 7.5; 325 TABLET ORAL at 16:38

## 2021-08-20 RX ADMIN — ROCURONIUM BROMIDE 20 MG: 50 INJECTION INTRAVENOUS at 11:24

## 2021-08-20 RX ADMIN — GLYCOPYRROLATE 0.5 MG: 0.2 INJECTION INTRAMUSCULAR; INTRAVENOUS at 12:40

## 2021-08-20 RX ADMIN — LIDOCAINE HYDROCHLORIDE 80 MG: 20 INJECTION, SOLUTION INFILTRATION; PERINEURAL at 08:25

## 2021-08-20 NOTE — PLAN OF CARE
Goal Outcome Evaluation:               Received from PACU @ 1550, L5-S1, island dressing intact with a small spot of drainage, maryse drain, IV PCA, norco x 1, valium x 1, pt continues to rate pain >8, but nonverbal indicators not present, HOB 15 degrees, high in place, educated on non-pharmacological pain relievers

## 2021-08-20 NOTE — OP NOTE
Preoperative diagnosis: Recurrent right L5-S1 herniated disc with radiculopathy and motor deficit    Postoperative diagnosis: Same as above    Procedures performed: Open L5-S1 decompression and bilateral discectomy with placement of interbody cages and transcortical L5-S1 pedicle screw and pete fixation using the [employer] robot    Surgeon: Rusty    First Assistant: Renee Smith  (She greatly assisted in the exposure, visualization of neural structures, control of bleeding, retraction, and closure of the incision. Her skilled assistance was necessary for the success of this case.)    Anesthesia: General endotracheal    EBL: 300 cc    Complications: None    Specimen sent: None    Drains: 7 mm flat Ramon-Solano epidural drain    Findings: Large recurrent herniated disc    Postoperative condition: Good    Indications for the operation:The patient is a 45-year-old female who has undergone a previous right L5-S1 diskectomy a few years ago.  She presented at that time with a motor deficit and significant pain.  The pain got better and the motor deficit essentially went away but she has had a recurrence of pain and severe gastrocnemius weakness due to a recurrent herniated disk.  There was some disk space collapse.  I brought her in for a decompression and fusion to not only treat the radiculopathy, but to essentially eliminate the risk of recurrence, at least at that level.          Informed consent: She understood that the goal of surgery is relief of radiating leg pain, improvement of numbness, tingling, and weakness, and reduction in overall low back pain. The risks include, but are not limited to, infection, hemorrhage requiring transfusion or reoperation, CSF leak requiring reoperation, incomplete relief of symptoms, psuedoarthrosis resulting in chronic low back pain, hardward problems requiring revision or removal, potential need for additional surgery in the future, stroke, paralysis, coma, and death. The  patient agrees to proceed.           Details of the operation:The patient was taken to the operating room and remained on her gurney in the supine position.  After induction of endotracheal intubation, she got 2 g of Kefzol as per the SCIP protocol.  A Adams catheter was placed.  SCDs were placed.  Venous access was secured.  She was rolled over in the prone position on the radiolucent Ramon spinal table.  All pressure points were padded including the brachial plexus.  We marked out the incision in the midline at L5-S1 with the C-arm.  The lumbar region was then prepped and draped in the usual sterile fashion as was the C-arm.  We did a surgical timeout.  I injected 30 mL of 0.25% Marcaine with epinephrine into the paraspinous musculature on the right and the left at L5-S1.  A linear incision was made from L5 to S1 with a #10 skin knife.  Hemostasis was obtained with monopolar cautery.  Dissection was taken all the way down to the lumbar fascia, which was divided just to the right and the left at L5-S1.  A Adam periosteal was used to strip the paraspinous musculature out laterally.  Self-retaining retractors were placed. Initial x-rays showed that the towel clip was at L5-S1.  Another x-ray showed the needle at the L5-S1 disk space.  The spinous processes were removed at L5 and S1 with a Leksell rongeur.  The microscope was draped and brought into the field.  Its use was essential to the performance of microneurosurgical technique.  Using a 5 mm round marylu, I did a revision central laminectomy at L5-S1 all the way down to ligamentum flavum.  I switched over to a 3 mm matchstick and then essentially did a complete facetectomy at L5-S1 on both sides decompressing both the S1 and the L5 roots above.  At the right, there was a lot of epidural scar tissue, which I dissected through and found a large recurrent herniated disk.  I incised the disk on both sides and then essentially did a near complete diskectomy  bilaterally decompressing both the right and the left S1 nerve roots.  I measured out the 12 mm [employer] interbody cage.  I harvested laminar autograft and I mixed it with demineralized bone matrix into the interspace after preparing the endplates.  I packed 2 Adaptix 10 mm cages and impacted them into place using C-arm guidance with a little bit of recession.  I then placed a pin in the right superior inferior iliac spine and then attached the [employer] robot and went through the registration process and planned out transcortical pathways to L5-S1 bilaterally.  I placed the left L5, the left S1, the right S1 and the right L5 screws using the robotic navigation system and got a confirmatory x-ray afterwards and then placed 35 mm rods bilaterally and tightened them down using the breakaway technique.  Hemostasis was obtained.  Antibiotic irrigation was used.  No CSF leakage was seen.  Floseal was used.  A 7 mm flat Ramon-Solano drain was left in the epidural space and exited through a separate stab incision and secured to the skin with 2-0 silk.  The fascia was reapproximated with 0 Vicryl interrupted suture.  The subcutaneous layer was closed with 2-0 interrupted Vicryl suture.  The skin was closed with running 4-0 Vicryl subcuticular.   Steri-Strips and a clean, dry dressing were placed.  The patient tolerated the procedure well, and there were no complications.  She was rolled over into the supine position and extubated, and taken to the recovery room in satisfactory condition.

## 2021-08-20 NOTE — ANESTHESIA PREPROCEDURE EVALUATION
Anesthesia Evaluation     Patient summary reviewed and Nursing notes reviewed   no history of anesthetic complications:               Airway   Mallampati: II  TM distance: >3 FB  Neck ROM: full  no difficulty expected  Dental - normal exam     Pulmonary - negative pulmonary ROS    breath sounds clear to auscultation  (-) shortness of breath, sleep apnea, decreased breath sounds, wheezes  Cardiovascular - normal exam  Exercise tolerance: good (4-7 METS)    Rhythm: regular  Rate: normal    (-) past MI, angina, CHF, orthopnea, PND, REYNOLDS, PVD      Neuro/Psych  (+) weakness (calf), numbness, psychiatric history Anxiety and Depression,     (-) seizures, neuromuscular disease, TIA, CVA, dizziness/light headedness  GI/Hepatic/Renal/Endo    (+) obesity,     (-) liver disease, diabetes    Musculoskeletal     (+) back pain, gait problem, radiculopathy Right lower extremity  Abdominal  - normal exam   Substance History - negative use  (-) alcohol use, drug use     OB/GYN negative ob/gyn ROS         Other   arthritis,                      Anesthesia Plan    ASA 2     general   (I discussed with the patient the relevant risks of general anesthesia including, but not limited to, nausea, vomiting, disorientation, post-op delirium, nerve injury, oral/dental injury, awareness, stroke, and death.  I also reviewed any clinically relevant lab and imaging results.)  intravenous induction     Anesthetic plan, all risks, benefits, and alternatives have been provided, discussed and informed consent has been obtained with: patient.    Plan discussed with CRNA.

## 2021-08-20 NOTE — BRIEF OP NOTE
LUMBAR FUSION POSTERIOR AND INSTRUMENTATION WITH NEURO ROBOT  Progress Note    Oksana Pillai  8/20/2021    Pre-op Diagnosis:   History of spinal surgery [Z98.890]  Calf muscle weakness [M62.81]  Herniation of lumbar intervertebral disc with radiculopathy [M51.16]       Post-Op Diagnosis Codes:     * History of spinal surgery [Z98.890]     * Calf muscle weakness [M62.81]     * Herniation of lumbar intervertebral disc with radiculopathy [M51.16]    Procedure/CPT® Codes:        Procedure(s):  Open lumbar decompression/discectomy lumbar five/sacral one with posterior lumbar interbody fusion with cages and transcortical screw/pete fixation using the [employer] robot    Surgeon(s):  Brian Ojeda MD    Anesthesia: General    Staff:   Cell Saver : Ramsey Corey  Circulator: Himanshu Gutiérrez RN; Jermaine Whitaker RN  Radiology Technologist: Jessica Mane, RRT; Dionte Garcia; Renea Nunez  Scrub Person: Rose Dang Emily  Vendor Representative: Cecil Solis  Assistant: Renee Smith CSA  Assistant: Renee Smith CSA      Estimated Blood Loss: 300 mL    Urine Voided: 340 mL    Specimens:                None          Drains:   Closed/Suction Drain Inferior Back Bulb (Active)   Site Description Unable to view 08/20/21 1522   Dressing Status Clean;Dry;Intact 08/20/21 1522   Drainage Appearance Bloody 08/20/21 1522   Status To bulb suction 08/20/21 1522   Output (mL) 40 mL 08/20/21 1435       Urethral Catheter Silicone 16 Fr. (Active)   Daily Indications Acute Urinary Retention 08/20/21 1522   Site Assessment Clean;Skin intact 08/20/21 1301   Collection Container Standard drainage bag 08/20/21 1522   Securement Method Securing device 08/20/21 1522   Output (mL) 900 mL 08/20/21 1539       Findings: Large recurrent herniated disc on the right    Complications: None    Assistant: Renee Smith CSA  was responsible for performing the following activities: Retraction, Suction, Irrigation,  Suturing, Closing and Placing Dressing and their skilled assistance was necessary for the success of this case.    Brian Ojeda MD     Date: 8/20/2021  Time: 17:08 EDT

## 2021-08-20 NOTE — ANESTHESIA PROCEDURE NOTES
Airway  Urgency: elective    Date/Time: 8/20/2021 8:27 AM  Airway not difficult    General Information and Staff    Patient location during procedure: OR  CRNA: Cherry Barry CRNA    Indications and Patient Condition  Indications for airway management: airway protection    Preoxygenated: yes  Mask difficulty assessment: 1 - vent by mask    Final Airway Details  Final airway type: endotracheal airway      Successful airway: ETT  Cuffed: yes   Successful intubation technique: direct laryngoscopy  Endotracheal tube insertion site: oral  Blade: Carmella  Blade size: 3  ETT size (mm): 7.0  Cormack-Lehane Classification: grade I - full view of glottis  Placement verified by: chest auscultation and capnometry   Measured from: lips  ETT/EBT  to lips (cm): 22  Number of attempts at approach: 1  Assessment: lips, teeth, and gum same as pre-op and atraumatic intubation    Additional Comments   ett cuff up at MOP

## 2021-08-20 NOTE — ANESTHESIA POSTPROCEDURE EVALUATION
"Patient: Oksana Pillai    Procedure Summary     Date: 08/20/21 Room / Location: [facility] OR [address] [place] [address] MAIN OR    Anesthesia Start: 0820 Anesthesia Stop: 1308    Procedure: Open lumbar decompression/discectomy lumbar five/sacral one with posterior lumbar interbody fusion with cages and transcortical screw/pete fixation using the [employer] robot (Right Spine Lumbar) Diagnosis:       History of spinal surgery      Calf muscle weakness      Herniation of lumbar intervertebral disc with radiculopathy      (History of spinal surgery [Z98.890])      (Calf muscle weakness [M62.81])      (Herniation of lumbar intervertebral disc with radiculopathy [M51.16])    Surgeons: Brian Ojeda MD Provider: Lex Alicea MD    Anesthesia Type: general ASA Status: 2          Anesthesia Type: general    Vitals  Vitals Value Taken Time   /67 08/20/21 1506   Temp 36.9 °C (98.5 °F) 08/20/21 1301   Pulse 71 08/20/21 1519   Resp 18 08/20/21 1435   SpO2 100 % 08/20/21 1519   Vitals shown include unvalidated device data.        Post Anesthesia Care and Evaluation    Patient location during evaluation: bedside  Patient participation: complete - patient participated  Level of consciousness: awake and alert  Pain management: adequate  Airway patency: patent  Anesthetic complications: No anesthetic complications    Cardiovascular status: acceptable  Respiratory status: acceptable  Hydration status: acceptable    Comments: /84   Pulse 90   Temp 36.9 °C (98.5 °F) (Oral)   Resp 18   Ht 170.2 cm (67\")   Wt 95.5 kg (210 lb 8.6 oz)   SpO2 100%   BMI 32.98 kg/m²     Patient is stable postoperatively and has adequately recovered from anesthesia as described above unless otherwise noted      "

## 2021-08-21 LAB
ANION GAP SERPL CALCULATED.3IONS-SCNC: 6.5 MMOL/L (ref 5–15)
BASOPHILS # BLD AUTO: 0.02 10*3/MM3 (ref 0–0.2)
BASOPHILS NFR BLD AUTO: 0.1 % (ref 0–1.5)
BUN SERPL-MCNC: 8 MG/DL (ref 6–20)
BUN/CREAT SERPL: 12.3 (ref 7–25)
CALCIUM SPEC-SCNC: 8.8 MG/DL (ref 8.6–10.5)
CHLORIDE SERPL-SCNC: 106 MMOL/L (ref 98–107)
CO2 SERPL-SCNC: 24.5 MMOL/L (ref 22–29)
CREAT SERPL-MCNC: 0.65 MG/DL (ref 0.57–1)
DEPRECATED RDW RBC AUTO: 43.9 FL (ref 37–54)
EOSINOPHIL # BLD AUTO: 0 10*3/MM3 (ref 0–0.4)
EOSINOPHIL NFR BLD AUTO: 0 % (ref 0.3–6.2)
ERYTHROCYTE [DISTWIDTH] IN BLOOD BY AUTOMATED COUNT: 12.1 % (ref 12.3–15.4)
GFR SERPL CREATININE-BSD FRML MDRD: 99 ML/MIN/1.73
GLUCOSE SERPL-MCNC: 94 MG/DL (ref 65–99)
HCT VFR BLD AUTO: 38.9 % (ref 34–46.6)
HGB BLD-MCNC: 12.9 G/DL (ref 12–15.9)
IMM GRANULOCYTES # BLD AUTO: 0.06 10*3/MM3 (ref 0–0.05)
IMM GRANULOCYTES NFR BLD AUTO: 0.4 % (ref 0–0.5)
LYMPHOCYTES # BLD AUTO: 2.47 10*3/MM3 (ref 0.7–3.1)
LYMPHOCYTES NFR BLD AUTO: 16.6 % (ref 19.6–45.3)
MCH RBC QN AUTO: 32.5 PG (ref 26.6–33)
MCHC RBC AUTO-ENTMCNC: 33.2 G/DL (ref 31.5–35.7)
MCV RBC AUTO: 98 FL (ref 79–97)
MONOCYTES # BLD AUTO: 1.02 10*3/MM3 (ref 0.1–0.9)
MONOCYTES NFR BLD AUTO: 6.9 % (ref 5–12)
NEUTROPHILS NFR BLD AUTO: 11.32 10*3/MM3 (ref 1.7–7)
NEUTROPHILS NFR BLD AUTO: 76 % (ref 42.7–76)
NRBC BLD AUTO-RTO: 0 /100 WBC (ref 0–0.2)
PLATELET # BLD AUTO: 191 10*3/MM3 (ref 140–450)
PMV BLD AUTO: 11.4 FL (ref 6–12)
POTASSIUM SERPL-SCNC: 4 MMOL/L (ref 3.5–5.2)
RBC # BLD AUTO: 3.97 10*6/MM3 (ref 3.77–5.28)
SODIUM SERPL-SCNC: 137 MMOL/L (ref 136–145)
WBC # BLD AUTO: 14.89 10*3/MM3 (ref 3.4–10.8)

## 2021-08-21 PROCEDURE — 85025 COMPLETE CBC W/AUTO DIFF WBC: CPT | Performed by: NEUROLOGICAL SURGERY

## 2021-08-21 PROCEDURE — 80048 BASIC METABOLIC PNL TOTAL CA: CPT | Performed by: NEUROLOGICAL SURGERY

## 2021-08-21 PROCEDURE — 97161 PT EVAL LOW COMPLEX 20 MIN: CPT

## 2021-08-21 PROCEDURE — 25010000003 CEFAZOLIN IN DEXTROSE 2-4 GM/100ML-% SOLUTION: Performed by: NEUROLOGICAL SURGERY

## 2021-08-21 PROCEDURE — 99024 POSTOP FOLLOW-UP VISIT: CPT | Performed by: NURSE PRACTITIONER

## 2021-08-21 PROCEDURE — 97116 GAIT TRAINING THERAPY: CPT

## 2021-08-21 RX ORDER — DIAZEPAM 5 MG/1
5 TABLET ORAL EVERY 8 HOURS PRN
Status: DISCONTINUED | OUTPATIENT
Start: 2021-08-21 | End: 2021-08-23 | Stop reason: HOSPADM

## 2021-08-21 RX ORDER — METHOCARBAMOL 750 MG/1
750 TABLET, FILM COATED ORAL EVERY 8 HOURS SCHEDULED
Status: DISCONTINUED | OUTPATIENT
Start: 2021-08-21 | End: 2021-08-22

## 2021-08-21 RX ORDER — DOCUSATE SODIUM 100 MG/1
200 CAPSULE, LIQUID FILLED ORAL 2 TIMES DAILY
Status: DISCONTINUED | OUTPATIENT
Start: 2021-08-21 | End: 2021-08-23 | Stop reason: HOSPADM

## 2021-08-21 RX ORDER — BISACODYL 10 MG
10 SUPPOSITORY, RECTAL RECTAL DAILY PRN
Status: DISCONTINUED | OUTPATIENT
Start: 2021-08-21 | End: 2021-08-23 | Stop reason: HOSPADM

## 2021-08-21 RX ORDER — POLYETHYLENE GLYCOL 3350 17 G/17G
17 POWDER, FOR SOLUTION ORAL DAILY
Status: DISCONTINUED | OUTPATIENT
Start: 2021-08-21 | End: 2021-08-23 | Stop reason: HOSPADM

## 2021-08-21 RX ORDER — MELATONIN
4000 DAILY
Status: DISCONTINUED | OUTPATIENT
Start: 2021-08-21 | End: 2021-08-23 | Stop reason: HOSPADM

## 2021-08-21 RX ADMIN — CEFAZOLIN SODIUM 2 G: 2 INJECTION, SOLUTION INTRAVENOUS at 04:38

## 2021-08-21 RX ADMIN — FLUTICASONE PROPIONATE 2 SPRAY: 50 SPRAY, METERED NASAL at 08:23

## 2021-08-21 RX ADMIN — CEFAZOLIN SODIUM 2 G: 2 INJECTION, SOLUTION INTRAVENOUS at 11:44

## 2021-08-21 RX ADMIN — SODIUM CHLORIDE, POTASSIUM CHLORIDE, SODIUM LACTATE AND CALCIUM CHLORIDE 100 ML/HR: 600; 310; 30; 20 INJECTION, SOLUTION INTRAVENOUS at 08:22

## 2021-08-21 RX ADMIN — POLYETHYLENE GLYCOL 3350 17 G: 17 POWDER, FOR SOLUTION ORAL at 11:44

## 2021-08-21 RX ADMIN — SERTRALINE 175 MG: 100 TABLET, FILM COATED ORAL at 08:22

## 2021-08-21 RX ADMIN — HYDROCODONE BITARTRATE AND ACETAMINOPHEN 1 TABLET: 7.5; 325 TABLET ORAL at 08:22

## 2021-08-21 RX ADMIN — METHOCARBAMOL TABLETS 750 MG: 750 TABLET, COATED ORAL at 14:55

## 2021-08-21 RX ADMIN — HYDROCODONE BITARTRATE AND ACETAMINOPHEN 1 TABLET: 7.5; 325 TABLET ORAL at 18:47

## 2021-08-21 RX ADMIN — HYDROCODONE BITARTRATE AND ACETAMINOPHEN 1 TABLET: 7.5; 325 TABLET ORAL at 14:55

## 2021-08-21 RX ADMIN — DOCUSATE SODIUM 200 MG: 100 CAPSULE, LIQUID FILLED ORAL at 21:13

## 2021-08-21 RX ADMIN — METHOCARBAMOL TABLETS 750 MG: 750 TABLET, COATED ORAL at 21:13

## 2021-08-21 RX ADMIN — Medication 4000 UNITS: at 11:44

## 2021-08-21 RX ADMIN — HYDROCODONE BITARTRATE AND ACETAMINOPHEN 1 TABLET: 7.5; 325 TABLET ORAL at 04:38

## 2021-08-21 RX ADMIN — DOCUSATE SODIUM 100 MG: 100 CAPSULE, LIQUID FILLED ORAL at 08:22

## 2021-08-21 RX ADMIN — SODIUM CHLORIDE, PRESERVATIVE FREE 3 ML: 5 INJECTION INTRAVENOUS at 08:23

## 2021-08-21 RX ADMIN — CETIRIZINE HYDROCHLORIDE 10 MG: 10 TABLET ORAL at 08:22

## 2021-08-21 NOTE — PLAN OF CARE
Goal Outcome Evaluation:           Progress: improving   Pt is a post op day 1 of a L5 S1 Decompression with fusion, dressing is clean dry and intact. Small amount of dried drainage noted. Pt continues with the Island dressing. Pt continues with the Dilaudid PCA pump. Pt also taking PO pain meds. Pt rates her pain a 9-10. Pt continues with the high to bedside drainage. HOB 15 degrees. Pt has ambulated due to increased pain. Pt continues with the DONNA drain. Pt educated on the importance of ambulation and its benefits to her recovery, pt voiced understanding. Pt is resting at this time, will continue to monitor.

## 2021-08-21 NOTE — PLAN OF CARE
Pt is a 44 yo F pod 1 L5-S1 decompression and fusion. Pt lives with spouse and children in multi-level home with 2 steps no rails to enter, can reside on main level upon DC. Pt has FWW and BSC from family members, denies DME needs. Educated on spinal precautions and how to log roll. Required [place] for log rolling, CGA for transfers and gait up to 200ft with FWW. Educated to sit up in chair 3 x day for short durations 20-30 min. Skilled PT needed to address above impairments. DC recs include home with assist and HHPT to follow.      Problem: Adult Inpatient Plan of Care  Goal: Plan of Care Review  Outcome: Ongoing, Progressing  Flowsheets (Taken 8/21/2021 6504)  Plan of Care Reviewed With: patient   Goal Outcome Evaluation:  Plan of Care Reviewed With: patient

## 2021-08-21 NOTE — PLAN OF CARE
Goal Outcome Evaluation:  Plan of Care Reviewed With: patient        Progress: improving  Outcome Summary: Pt is 46 y/o female, pod 1 L5-S1 decompression and fusion. Dressing is cdi, vss, n/v intact. Pain controlled with PO Norco, Robaxin, and Dilaudid PCA. Adams catheter to bedside drainage. Pt ambulated in hallway and room. Educated pt on pain control. Will continue to monitor.

## 2021-08-21 NOTE — THERAPY EVALUATION
Patient Name: Oksana Pillai  : 1975    MRN: [id number]                              Today's Date: 2021       Admit Date: 2021    Visit Dx:     ICD-10-CM ICD-9-CM   1. History of spinal surgery  Z98.890 V45.89   2. Calf muscle weakness  M62.81 728.87   3. Herniation of lumbar intervertebral disc with radiculopathy  M51.16 722.10     724.4     Patient Active Problem List   Diagnosis   • Calf muscle weakness   • Intervertebral disc disorder with radiculopathy of lumbosacral region   • Chronic bilateral low back pain with right-sided sciatica   • DDD (degenerative disc disease), lumbar   • Allergic rhinitis   • Anxiety and depression   • Suspected sleep apnea   • Obesity (BMI 30-39.9)   • TMJ (temporomandibular joint disorder)   • Hypersomnia with sleep apnea   • Sleep-related bruxism   • PLMD (periodic limb movement disorder)   • History of spinal surgery   • Lumbar spondylolysis   • Chronic pain syndrome   • Degeneration of intervertebral disc at C5-C6 level   • Spinal stenosis of lumbar region with neurogenic claudication   • Herniation of lumbar intervertebral disc with radiculopathy   • Herniated lumbar intervertebral disc     Past Medical History:   Diagnosis Date   • Allergic rhinitis    • Anxiety and depression    • Back pain     RADIATES DOWN RT LEG, NUMBNESS/TINGLING   • Cervical herniated disc     GETS INJECTIONS   • Chronic bilateral low back pain with right-sided sciatica 2018   • COVID-19 vaccine series completed    • Eczema    • GI bleed 2015    Polyp found on  c-scope and GI bleed during delivery of last baby    • Keratosis pilaris    • Lumbar herniated disc    • Numbness     RIGHT FOOT-FIRST THREE TOES   • Sciatica    • TMJ pain dysfunction syndrome      Past Surgical History:   Procedure Laterality Date   • CHOLECYSTECTOMY     • COLONOSCOPY  2018   • LUMBAR DISCECTOMY FUSION INSTRUMENTATION Right 2017    Procedure: Right L5/S1 Metrx Microdiscectomy;  Surgeon: Brian  MD Rusty;  Location: [facility] OR;  Service:    • TONSILLECTOMY       General Information     Row Name 08/21/21 1545          Physical Therapy Time and Intention    Document Type  evaluation  -AE     Mode of Treatment  individual therapy;physical therapy  -AE     Row Name 08/21/21 1547          General Information    Patient Profile Reviewed  yes  -AE     Prior Level of Function  independent:  -AE     Existing Precautions/Restrictions  fall;brace worn when out of bed;spinal  -AE     Row Name 08/21/21 1549          Living Environment    Lives With  child(lexie), dependent  -AE     Row Name 08/21/21 1545          Home Main Entrance    Number of Stairs, Main Entrance  two  -AE     Stair Railings, Main Entrance  none  -AE     Row Name 08/21/21 1545          Stairs Within Home, Primary    Number of Stairs, Within Home, Primary  none  -AE     Row Name 08/21/21 1549          Cognition    Orientation Status (Cognition)  oriented x 4  -AE     Row Name 08/21/21 1549          Safety Issues, Functional Mobility    Impairments Affecting Function (Mobility)  range of motion (ROM);pain;strength  -AE       User Key  (r) = Recorded By, (t) = Taken By, (c) = Cosigned By    Initials Name Provider Type    AE Cary Cruz PT Physical Therapist        Mobility     Row Name 08/21/21 1542          Bed Mobility    Bed Mobility  bed mobility (all) activities  -AE     All Activities, [place] (Bed Mobility)  minimum assist (75% patient effort);1 person assist  -AE     Assistive Device (Bed Mobility)  bed rails  -AE     Comment (Bed Mobility)  via log rolling to L EOB  -AE     Row Name 08/21/21 1544          Transfers    Comment (Transfers)  CGA all transfers with FWW  -AE     Row Name 08/21/21 1549          Bed-Chair Transfer    Bed-Chair [place] (Transfers)  contact guard;1 person assist  -AE     Assistive Device (Bed-Chair Transfers)  walker, front-wheeled  -AE     Row Name 08/21/21 1544          Sit-Stand Transfer     Sit-Stand [place] (Transfers)  contact guard;1 person assist  -AE     Assistive Device (Sit-Stand Transfers)  walker, front-wheeled  -AE     Row Name 08/21/21 1546          Gait/Stairs (Locomotion)    [place] Level (Gait)  contact guard;1 person assist  -AE     Assistive Device (Gait)  walker, front-wheeled  -AE     Distance in Feet (Gait)  200ft  -AE     Deviations/Abnormal Patterns (Gait)  antalgic;stride length decreased  -AE     Bilateral Gait Deviations  forward flexed posture;heel strike decreased  -AE       User Key  (r) = Recorded By, (t) = Taken By, (c) = Cosigned By    Initials Name Provider Type    AE Cary Cruz PT Physical Therapist        Obj/Interventions     Row Name 08/21/21 1546          Range of Motion Comprehensive    Comment, General Range of Motion  BLE WFL  -AE     Row Name 08/21/21 1546          Strength Comprehensive (MMT)    Comment, General Manual Muscle Testing (MMT) Assessment  Generalized weakness;reports R calf weakness  -AE       User Key  (r) = Recorded By, (t) = Taken By, (c) = Cosigned By    Initials Name Provider Type    AE Cary Cruz, PT Physical Therapist        Goals/Plan     Row Name 08/21/21 1547          Bed Mobility Goal 1 (PT)    Activity/Assistive Device (Bed Mobility Goal 1, PT)  bed mobility activities, all  -AE     [place] Level/Cues Needed (Bed Mobility Goal 1, PT)  contact guard assist;1 person assist  -AE     Time Frame (Bed Mobility Goal 1, PT)  1 week  -AE     Progress/Outcomes (Bed Mobility Goal 1, PT)  continuing progress toward goal  -AE     Row Name 08/21/21 1547          Transfer Goal 1 (PT)    Activity/Assistive Device (Transfer Goal 1, PT)  transfers, all  -AE     [place] Level/Cues Needed (Transfer Goal 1, PT)  standby assist  -AE     Time Frame (Transfer Goal 1, PT)  1 week  -AE     Progress/Outcome (Transfer Goal 1, PT)  continuing progress toward goal  -AE     Row Name 08/21/21 1547          Gait Training Goal 1 (PT)     Activity/Assistive Device (Gait Training Goal 1, PT)  gait (walking locomotion);assistive device use  -AE     [place] Level (Gait Training Goal 1, PT)  standby assist  -AE     Distance (Gait Training Goal 1, PT)  200ft  -AE     Time Frame (Gait Training Goal 1, PT)  1 week  -AE     Progress/Outcome (Gait Training Goal 1, PT)  continuing progress toward goal  -AE       User Key  (r) = Recorded By, (t) = Taken By, (c) = Cosigned By    Initials Name Provider Type    AE Cary Cruz PT Physical Therapist        Clinical Impression     Row Name 08/21/21 1547          Pain    Additional Documentation  Pain Scale: Numbers Pre/Post-Treatment (Group)  -AE     Row Name 08/21/21 154          Pain Scale: Numbers Pre/Post-Treatment    Pretreatment Pain Rating  6/10  -AE     Posttreatment Pain Rating  7/10  -AE     Pain Location - Side  Right  -AE     Pain Location - Orientation  incisional  -AE     Pain Location  back  -AE     Pain Intervention(s)  Repositioned;Ambulation/increased activity;Rest  -AE     Row Name 08/21/21 1540          Plan of Care Review    Plan of Care Reviewed With  patient  -AE     Row Name 08/21/21 1546          Therapy Assessment/Plan (PT)    Patient/Family Therapy Goals Statement (PT)  plans to Dc home  -AE     Rehab Potential (PT)  good, to achieve stated therapy goals  -AE     Criteria for Skilled Interventions Met (PT)  yes;meets criteria  -AE     Row Name 08/21/21 1546          Positioning and Restraints    Pre-Treatment Position  in bed  -AE     Post Treatment Position  chair  -AE     In Chair  reclined;call light within reach;encouraged to call for assist;exit alarm on  -AE       User Key  (r) = Recorded By, (t) = Taken By, (c) = Cosigned By    Initials Name Provider Type    AE Cary Cruz PT Physical Therapist        Outcome Measures     Row Name 08/21/21 1901          How much help from another person do you currently need...    Turning from your back to your side while in flat  bed without using bedrails?  3  -AE     Moving from lying on back to sitting on the side of a flat bed without bedrails?  2  -AE     Moving to and from a bed to a chair (including a wheelchair)?  3  -AE     Standing up from a chair using your arms (e.g., wheelchair, bedside chair)?  3  -AE     Climbing 3-5 steps with a railing?  3  -AE     To walk in hospital room?  3  -AE     AM-PAC 6 Clicks Score (PT)  17  -AE     Row Name 08/21/21 1548          Functional Assessment    Outcome Measure Options  AM-PAC 6 Clicks Basic Mobility (PT)  -AE       User Key  (r) = Recorded By, (t) = Taken By, (c) = Cosigned By    Initials Name Provider Type    AE Cary Cruz PT Physical Therapist                       Physical Therapy Education                 Title: PT OT SLP Therapies (Done)     Topic: Physical Therapy (Done)     Point: Mobility training (Done)     Learning Progress Summary           Patient Acceptance, E,TB, VU,NR by AE at 8/21/2021 1548                   Point: Home exercise program (Done)     Learning Progress Summary           Patient Acceptance, E,TB, VU,NR by AE at 8/21/2021 1548                   Point: Body mechanics (Done)     Learning Progress Summary           Patient Acceptance, E,TB, VU,NR by AE at 8/21/2021 1548                   Point: Precautions (Done)     Learning Progress Summary           Patient Acceptance, E,TB, VU,NR by AE at 8/21/2021 1548                               User Key     Initials Effective Dates Name Provider Type Discipline    AE 06/16/21 -  Cary Cruz PT Physical Therapist PT              PT Recommendation and Plan  Planned Therapy Interventions (PT): balance training, bed mobility training, gait training, home exercise program, motor coordination training, neuromuscular re-education, patient/family education, postural re-education, ROM (range of motion), stair training, strengthening, stretching, transfer training  Plan of Care Reviewed With: patient     Time  Calculation:   PT Charges     Row Name 08/21/21 1555             Time Calculation    Start Time  1330  -AE      Stop Time  1400  -AE      Time Calculation (min)  30 min  -AE      PT Received On  08/21/21  -AE      PT - Next Appointment  08/22/21  -AE      PT Goal Re-Cert Due Date  08/28/21  -AE         Time Calculation- PT    Total Timed Code Minutes- PT  20 minute(s)  -AE        User Key  (r) = Recorded By, (t) = Taken By, (c) = Cosigned By    Initials Name Provider Type    AE Cary Cruz, RICHARD Physical Therapist        Therapy Charges for Today     Code Description Service Date Service Provider Modifiers Qty    75281857101 HC PT EVAL LOW COMPLEXITY 2 8/21/2021 Cary Cruz, PT GP 1    56838505386 HC GAIT TRAINING EA 15 MIN 8/21/2021 Cary Cruz, PT GP 1    57552080514 HC PT THER SUPP EA 15 MIN 8/21/2021 Cary Cruz, PT GP 1          PT G-Codes  Outcome Measure Options: AM-PAC 6 Clicks Basic Mobility (PT)  AM-PAC 6 Clicks Score (PT): 17    Cary Cruz PT  8/21/2021

## 2021-08-21 NOTE — PROGRESS NOTES
NEUROSURGERY POST OP PROGRESS NOTE     LOS: 1 day   Patient Care Team:  Olinda Winters MD as PCP - General (Family Medicine)  Brian Ojeda MD as Surgeon (Neurosurgery)  Marissa Avendano MD as Consulting Physician (Gastroenterology)  Kristyn Fontaine MD as Consulting Physician (Obstetrics and Gynecology)  Iggy Bartlett MD as Consulting Physician (Pain Medicine)    Chief Complaint: Back pain    Subjective       Interval History: Having expected back spasms.  Right leg numbness better.  Has not walked yet. Feels that the strength in her right foot is getting better.    History taken from: patient chart    Objective      Vital Signs  Temp:  [97.5 °F (36.4 °C)-98.5 °F (36.9 °C)] 97.6 °F (36.4 °C)  Heart Rate:  [] 60  Resp:  [16-20] 16  BP: ()/(60-88) 90/60     Physical Exam:     Lumbar dressing intact; no evidence of bleeding or hematoma  DONNA drain intact - Drain - 215 cc; 150 cc since 0600 - still bloody  SCDs in place  Right gastroc strength improved 4-/5; had been 2/5 preoperatively  Sensation equal intact bilateral LE  Adams intact - clear yellow urine     Results Review:     I reviewed the patient's new clinical results.     .  Results from last 7 days   Lab Units 08/21/21  0444 08/18/21  0809   WBC 10*3/mm3 14.89* 6.27   HEMOGLOBIN g/dL 12.9 13.2   HEMATOCRIT % 38.9 39.1   PLATELETS 10*3/mm3 191 198     .  Results from last 7 days   Lab Units 08/21/21  0444 08/18/21  0809 08/18/21  0809   SODIUM mmol/L 137  --  140   POTASSIUM mmol/L 4.0  --  4.0   CHLORIDE mmol/L 106  --  106   CO2 mmol/L 24.5  --  25.4   BUN mg/dL 8  --  15   CREATININE mg/dL 0.65  --  0.72   GLUCOSE mg/dL 94   < > 84   CALCIUM mg/dL 8.8  --  8.8    < > = values in this interval not displayed.       Assessment/Plan       Calf muscle weakness    History of spinal surgery    Herniation of lumbar intervertebral disc with radiculopathy    Herniated lumbar intervertebral disc    POD 1 open L5-S1 decompression, bilateral  discectomy with placement of interbody cages, transcortical L5-S1 pedicle screw and pete fixation using [employer] robot for recurrent right L5-S1 herniated disc, radiculopathy and motor deficit  Postoperative lumbar spasms (expected)    Keep drain  CBC am  Order LSO brace; mobilize; PT ordered   Will start routine Robaxin.  Encouraged to use as needed Flexeril for breakthrough spasms.   Also encouraged to use oral pain medications and limit PCA pump for breakthrough pain.  DC PCA pump and Adams catheter tomorrow      An OTS LSO brace has been ordered due to diagnosis of post op lumbar fusion.  The purpose of the brace is to support weak muscles, stabilize and restrict movement of the trunk to aid in healing and pain relief of lumbar fusion.      Sakina Jackman, NEIL  08/21/21  09:11 EDT

## 2021-08-22 LAB
ANION GAP SERPL CALCULATED.3IONS-SCNC: 10.6 MMOL/L (ref 5–15)
BUN SERPL-MCNC: 12 MG/DL (ref 6–20)
BUN/CREAT SERPL: 15.2 (ref 7–25)
CALCIUM SPEC-SCNC: 8.5 MG/DL (ref 8.6–10.5)
CHLORIDE SERPL-SCNC: 103 MMOL/L (ref 98–107)
CO2 SERPL-SCNC: 23.4 MMOL/L (ref 22–29)
CREAT SERPL-MCNC: 0.79 MG/DL (ref 0.57–1)
DEPRECATED RDW RBC AUTO: 44.6 FL (ref 37–54)
ERYTHROCYTE [DISTWIDTH] IN BLOOD BY AUTOMATED COUNT: 11.9 % (ref 12.3–15.4)
GFR SERPL CREATININE-BSD FRML MDRD: 79 ML/MIN/1.73
GLUCOSE SERPL-MCNC: 81 MG/DL (ref 65–99)
HCT VFR BLD AUTO: 36.7 % (ref 34–46.6)
HGB BLD-MCNC: 11.8 G/DL (ref 12–15.9)
MCH RBC QN AUTO: 32.5 PG (ref 26.6–33)
MCHC RBC AUTO-ENTMCNC: 32.2 G/DL (ref 31.5–35.7)
MCV RBC AUTO: 101.1 FL (ref 79–97)
PLATELET # BLD AUTO: 153 10*3/MM3 (ref 140–450)
PMV BLD AUTO: 11.2 FL (ref 6–12)
POTASSIUM SERPL-SCNC: 4.1 MMOL/L (ref 3.5–5.2)
RBC # BLD AUTO: 3.63 10*6/MM3 (ref 3.77–5.28)
SODIUM SERPL-SCNC: 137 MMOL/L (ref 136–145)
WBC # BLD AUTO: 7.55 10*3/MM3 (ref 3.4–10.8)

## 2021-08-22 PROCEDURE — 25010000003 HYDROMORPHONE HCL PF 50 MG/5ML SOLUTION: Performed by: NURSE PRACTITIONER

## 2021-08-22 PROCEDURE — 85027 COMPLETE CBC AUTOMATED: CPT | Performed by: NURSE PRACTITIONER

## 2021-08-22 PROCEDURE — 97116 GAIT TRAINING THERAPY: CPT | Performed by: PHYSICAL THERAPIST

## 2021-08-22 PROCEDURE — 99024 POSTOP FOLLOW-UP VISIT: CPT | Performed by: NURSE PRACTITIONER

## 2021-08-22 PROCEDURE — 80048 BASIC METABOLIC PNL TOTAL CA: CPT | Performed by: NURSE PRACTITIONER

## 2021-08-22 RX ORDER — METHOCARBAMOL 500 MG/1
1000 TABLET, FILM COATED ORAL EVERY 6 HOURS SCHEDULED
Status: DISCONTINUED | OUTPATIENT
Start: 2021-08-22 | End: 2021-08-23 | Stop reason: HOSPADM

## 2021-08-22 RX ORDER — SIMETHICONE 80 MG
80 TABLET,CHEWABLE ORAL 4 TIMES DAILY PRN
Status: DISCONTINUED | OUTPATIENT
Start: 2021-08-22 | End: 2021-08-23 | Stop reason: HOSPADM

## 2021-08-22 RX ADMIN — SODIUM CHLORIDE, PRESERVATIVE FREE 3 ML: 5 INJECTION INTRAVENOUS at 08:51

## 2021-08-22 RX ADMIN — FLUTICASONE PROPIONATE 2 SPRAY: 50 SPRAY, METERED NASAL at 08:52

## 2021-08-22 RX ADMIN — DOCUSATE SODIUM 200 MG: 100 CAPSULE, LIQUID FILLED ORAL at 08:48

## 2021-08-22 RX ADMIN — HYDROMORPHONE HYDROCHLORIDE: 10 INJECTION, SOLUTION INTRAMUSCULAR; INTRAVENOUS; SUBCUTANEOUS at 02:05

## 2021-08-22 RX ADMIN — Medication 4000 UNITS: at 08:48

## 2021-08-22 RX ADMIN — HYDROCODONE BITARTRATE AND ACETAMINOPHEN 1 TABLET: 7.5; 325 TABLET ORAL at 08:51

## 2021-08-22 RX ADMIN — DOCUSATE SODIUM 200 MG: 100 CAPSULE, LIQUID FILLED ORAL at 20:20

## 2021-08-22 RX ADMIN — HYDROCODONE BITARTRATE AND ACETAMINOPHEN 1 TABLET: 7.5; 325 TABLET ORAL at 05:13

## 2021-08-22 RX ADMIN — HYDROCODONE BITARTRATE AND ACETAMINOPHEN 1 TABLET: 7.5; 325 TABLET ORAL at 16:56

## 2021-08-22 RX ADMIN — HYDROCODONE BITARTRATE AND ACETAMINOPHEN 1 TABLET: 7.5; 325 TABLET ORAL at 01:12

## 2021-08-22 RX ADMIN — SIMETHICONE 80 MG: 80 TABLET, CHEWABLE ORAL at 18:35

## 2021-08-22 RX ADMIN — METHOCARBAMOL TABLETS 750 MG: 750 TABLET, COATED ORAL at 05:13

## 2021-08-22 RX ADMIN — METHOCARBAMOL TABLETS 1000 MG: 500 TABLET, COATED ORAL at 23:36

## 2021-08-22 RX ADMIN — POLYETHYLENE GLYCOL 3350 17 G: 17 POWDER, FOR SOLUTION ORAL at 08:55

## 2021-08-22 RX ADMIN — CETIRIZINE HYDROCHLORIDE 10 MG: 10 TABLET ORAL at 08:48

## 2021-08-22 RX ADMIN — SODIUM CHLORIDE, PRESERVATIVE FREE 3 ML: 5 INJECTION INTRAVENOUS at 20:25

## 2021-08-22 RX ADMIN — HYDROCODONE BITARTRATE AND ACETAMINOPHEN 1 TABLET: 7.5; 325 TABLET ORAL at 13:05

## 2021-08-22 RX ADMIN — SERTRALINE 175 MG: 100 TABLET, FILM COATED ORAL at 08:48

## 2021-08-22 RX ADMIN — METHOCARBAMOL TABLETS 1000 MG: 500 TABLET, COATED ORAL at 16:56

## 2021-08-22 RX ADMIN — HYDROCODONE BITARTRATE AND ACETAMINOPHEN 1 TABLET: 7.5; 325 TABLET ORAL at 21:18

## 2021-08-22 NOTE — PLAN OF CARE
Goal Outcome Evaluation:           Progress: improving   Pt is a post op day 2 of an L5 S1 decompression with fusion. Dressing is clean dry and intact. Pt continues with the island dressing. Pt has ambulated in the hallway this shift. Pt continues with the Dilaudid PCA pump, high and DONNA drain. High to be removed in AM. Pt complains of increased pain. No sticks to LUE. Pt is resting at this time, will continue to monitor.

## 2021-08-22 NOTE — PROGRESS NOTES
NEUROSURGERY POST OP PROGRESS NOTE     LOS: 2 days   Patient Care Team:  Olinda Winters MD as PCP - General (Family Medicine)  Brian Ojeda MD as Surgeon (Neurosurgery)  Marissa Avendano MD as Consulting Physician (Gastroenterology)  Kristyn Fontaine MD as Consulting Physician (Obstetrics and Gynecology)  Iggy Bartlett MD as Consulting Physician (Pain Medicine)    Chief Complaint:  Back pain    Subjective     Interval History: Lots of back spasm despite Robaxin. Walked with PT. Was able to go to bath room and wash at sink. Wearing brace when walking or in chair. Felt worn out after bath. Has concerns about going home and being able to manage children. Adams and PCA removed this am; states she has voided 3 times since w/o difficulty.      History taken from: patient chart    Objective      Vital Signs  Temp:  [97 °F (36.1 °C)-98.5 °F (36.9 °C)] 98.5 °F (36.9 °C)  Heart Rate:  [64-72] 68  Resp:  [16] 16  BP: ()/(57-70) 105/67     Physical Exam:     AA&O x 3.   Lumbar dressing dry and intact.   DONNA drain intact - 175 cc reddish serosanguinous drainage  No calf swelling, tenderness to bilateral palpation.   Wearing SCD's.       Results Review:     I reviewed the patient's new clinical results.    .  Results from last 7 days   Lab Units 08/22/21  0528 08/21/21  0444 08/18/21  0809   WBC 10*3/mm3 7.55 14.89* 6.27   HEMOGLOBIN g/dL 11.8* 12.9 13.2   HEMATOCRIT % 36.7 38.9 39.1   PLATELETS 10*3/mm3 153 191 198     .  Results from last 7 days   Lab Units 08/22/21  0528 08/21/21  0444 08/21/21  0444 08/18/21  0809 08/18/21  0809   SODIUM mmol/L 137  --  137  --  140   POTASSIUM mmol/L 4.1  --  4.0  --  4.0   CHLORIDE mmol/L 103  --  106  --  106   CO2 mmol/L 23.4  --  24.5  --  25.4   BUN mg/dL 12  --  8  --  15   CREATININE mg/dL 0.79  --  0.65  --  0.72   GLUCOSE mg/dL 81   < > 94   < > 84   CALCIUM mg/dL 8.5*  --  8.8  --  8.8    < > = values in this interval not displayed.       Assessment/Plan       Calf  muscle weakness    History of spinal surgery    Herniation of lumbar intervertebral disc with radiculopathy    Herniated lumbar intervertebral disc    POD 2 open L5-S1 decompression with bilateral discectomy, interbody cages, transcortical L5-S1 pedicle screw and pete fixation using [employer] robot for recurrent right L5-S1 disc herniation, lumbosacral radiculopathy and motor deficit  Postoperative back spasms  Postoperative leukocytosis - resolved    Tolerating lumbosacral brace well; continue to mobilize  Increase Robaxin for spasm relief.  Keep drain.  We will OT to evaluate.  PT is following.  Patient is asking about subacute rehab. CCP to evaluate  Encouraged patient to keep moving as this will build her confidence in being able to discharge home in the next day or so.  CBC in anayeli.    NEIL Harrington  08/22/21  12:10 EDT

## 2021-08-22 NOTE — PLAN OF CARE
Goal Outcome Evaluation:  Plan of Care Reviewed With: patient        Progress: improving  Outcome Summary: Pt is 44 y/o female, pod 2 L5-S1 decompression/fusion. Dressing is cdi, vss, n/v intact with baseline numbness and tingling to RLE. Pain controlled with po meds. Voiding per BRP. Will continue to monitor.

## 2021-08-22 NOTE — THERAPY TREATMENT NOTE
Patient Name: Oksana Pillai  : 1975    MRN: [id number]                              Today's Date: 2021       Admit Date: 2021    Visit Dx:     ICD-10-CM ICD-9-CM   1. Herniated lumbar intervertebral disc  M51.26 722.10   2. History of spinal surgery  Z98.890 V45.89   3. Calf muscle weakness  M62.81 728.87   4. Herniation of lumbar intervertebral disc with radiculopathy  M51.16 722.10     724.4     Patient Active Problem List   Diagnosis   • Calf muscle weakness   • Intervertebral disc disorder with radiculopathy of lumbosacral region   • Chronic bilateral low back pain with right-sided sciatica   • DDD (degenerative disc disease), lumbar   • Allergic rhinitis   • Anxiety and depression   • Suspected sleep apnea   • Obesity (BMI 30-39.9)   • TMJ (temporomandibular joint disorder)   • Hypersomnia with sleep apnea   • Sleep-related bruxism   • PLMD (periodic limb movement disorder)   • History of spinal surgery   • Lumbar spondylolysis   • Chronic pain syndrome   • Degeneration of intervertebral disc at C5-C6 level   • Spinal stenosis of lumbar region with neurogenic claudication   • Herniation of lumbar intervertebral disc with radiculopathy   • Herniated lumbar intervertebral disc     Past Medical History:   Diagnosis Date   • Allergic rhinitis    • Anxiety and depression    • Back pain     RADIATES DOWN RT LEG, NUMBNESS/TINGLING   • Cervical herniated disc     GETS INJECTIONS   • Chronic bilateral low back pain with right-sided sciatica 2018   • COVID-19 vaccine series completed    • Eczema    • GI bleed 2015    Polyp found on  c-scope and GI bleed during delivery of last baby    • Keratosis pilaris    • Lumbar herniated disc    • Numbness     RIGHT FOOT-FIRST THREE TOES   • Sciatica    • TMJ pain dysfunction syndrome      Past Surgical History:   Procedure Laterality Date   • CHOLECYSTECTOMY     • COLONOSCOPY  2018   • LUMBAR DISCECTOMY FUSION INSTRUMENTATION Right 2017    Procedure:  Right L5/S1 Metrx Microdiscectomy;  Surgeon: Brian Ojeda MD;  Location: [facility] MAIN OR;  Service:    • TONSILLECTOMY       General Information     Row Name 08/22/21 1233          Physical Therapy Time and Intention    Document Type  therapy note (daily note)  -GT     Mode of Treatment  physical therapy  -GT     Row Name 08/22/21 1233          General Information    Patient Profile Reviewed  yes  -GT     Existing Precautions/Restrictions  fall;brace worn when out of bed;spinal  -GT     Row Name 08/22/21 1233          Cognition    Orientation Status (Cognition)  oriented x 4  -GT     Row Name 08/22/21 1233          Safety Issues, Functional Mobility    Impairments Affecting Function (Mobility)  range of motion (ROM);pain;strength  -GT       User Key  (r) = Recorded By, (t) = Taken By, (c) = Cosigned By    Initials Name Provider Type    GT Joce Soni, PT Physical Therapist        Mobility     Row Name 08/22/21 1233          Bed Mobility    Bed Mobility  bed mobility (all) activities  -GT     All Activities, [place] (Bed Mobility)  contact guard assist;standby assist  -GT     Assistive Device (Bed Mobility)  bed rails  -GT     Comment (Bed Mobility)  Able to demonstrate log roll  -GT     Row Name 08/22/21 1233          Transfers    Comment (Transfers)  CGA all tranfers with FWW  -GT     Row Name 08/22/21 1233          Bed-Chair Transfer    Bed-Chair [place] (Transfers)  not tested  -GT     Row Name 08/22/21 1233          Sit-Stand Transfer    Sit-Stand [place] (Transfers)  contact guard;1 person assist  -GT     Assistive Device (Sit-Stand Transfers)  walker, front-wheeled  -GT     Row Name 08/22/21 1233          Gait/Stairs (Locomotion)    [place] Level (Gait)  contact guard;1 person assist;standby assist  -GT     Assistive Device (Gait)  walker, front-wheeled  -GT     Distance in Feet (Gait)  300 ft  -GT     Deviations/Abnormal Patterns (Gait)  antalgic;stride length decreased  -GT      Bilateral Gait Deviations  forward flexed posture;heel strike decreased  -GT     [place] Level (Stairs)  contact guard;1 person assist;minimum assist (75% patient effort)  -GT     Stairs, Impairments  sensation decreased;strength decreased;pain  -GT       User Key  (r) = Recorded By, (t) = Taken By, (c) = Cosigned By    Initials Name Provider Type    Joce Oconnell, PT Physical Therapist        Obj/Interventions    No documentation.       Goals/Plan    No documentation.       Clinical Impression     Row Name 08/22/21 1236          Pain Scale: Numbers Pre/Post-Treatment    Pretreatment Pain Rating  5/10  -GT     Posttreatment Pain Rating  5/10  -GT     Pain Location - Side  Right  -GT     Pain Location - Orientation  incisional  -GT     Pain Location  back  -GT     Row Name 08/22/21 1236          Plan of Care Review    Plan of Care Reviewed With  patient  -GT     Progress  improving  -GT     Row Name 08/22/21 1236          Therapy Assessment/Plan (PT)    Rehab Potential (PT)  good, to achieve stated therapy goals  -GT     Criteria for Skilled Interventions Met (PT)  yes;meets criteria  -GT     Row Name 08/22/21 1236          Positioning and Restraints    Pre-Treatment Position  sitting in chair/recliner  -GT     Post Treatment Position  bed  -GT     In Bed  notified nsg;call light within reach;encouraged to call for assist;exit alarm on  -GT       User Key  (r) = Recorded By, (t) = Taken By, (c) = Cosigned By    Initials Name Provider Type    Joce Oconnell PT Physical Therapist        Outcome Measures     Row Name 08/22/21 1238          How much help from another person do you currently need...    Turning from your back to your side while in flat bed without using bedrails?  3  -GT     Moving from lying on back to sitting on the side of a flat bed without bedrails?  3  -GT     Moving to and from a bed to a chair (including a wheelchair)?  3  -GT     Standing up from a chair using your arms (e.g., wheelchair,  bedside chair)?  3  -GT     Climbing 3-5 steps with a railing?  3  -GT     To walk in hospital room?  3  -GT     AM-PAC 6 Clicks Score (PT)  18  -GT       User Key  (r) = Recorded By, (t) = Taken By, (c) = Cosigned By    Initials Name Provider Type    GT Joce Soni PT Physical Therapist                       Physical Therapy Education                 Title: PT OT SLP Therapies (Done)     Topic: Physical Therapy (Done)     Point: Mobility training (Done)     Learning Progress Summary           Patient Acceptance, E,TB, VU,NR by AE at 8/21/2021 1548                   Point: Home exercise program (Done)     Learning Progress Summary           Patient Acceptance, E,TB, VU,NR by AE at 8/21/2021 1548                   Point: Body mechanics (Done)     Learning Progress Summary           Patient Acceptance, E,TB, VU,NR by AE at 8/21/2021 1548                   Point: Precautions (Done)     Learning Progress Summary           Patient Acceptance, E,TB, VU,NR by AE at 8/21/2021 1548                               User Key     Initials Effective Dates Name Provider Type Discipline    AE 06/16/21 -  Cary Cruz PT Physical Therapist PT              PT Recommendation and Plan     Plan of Care Reviewed With: patient  Progress: improving     Time Calculation:   PT Charges     Row Name 08/22/21 1242             Time Calculation    Start Time  0943  -GT      Stop Time  1004  -GT      Time Calculation (min)  21 min  -GT      PT Received On  08/22/21  -GT      PT - Next Appointment  08/23/21  -GT         Time Calculation- PT    Total Timed Code Minutes- PT  21 minute(s)  -GT        User Key  (r) = Recorded By, (t) = Taken By, (c) = Cosigned By    Initials Name Provider Type    Joce Oconnell PT Physical Therapist        Therapy Charges for Today     Code Description Service Date Service Provider Modifiers Qty    89008223234 HC GAIT TRAINING EA 15 MIN 8/22/2021 Joce Soni PT GP 1          PT G-Codes  Outcome Measure  Options: AM-PAC 6 Clicks Basic Mobility (PT)  AM-[facility] 6 Clicks Score (PT): 18    Joce Soni, PT  8/22/2021

## 2021-08-22 NOTE — PLAN OF CARE
Problem: Adult Inpatient Plan of Care  Goal: Plan of Care Review  Recent Flowsheet Documentation  Taken 8/22/2021 1236 by Joce Soni PT  Progress: improving  Plan of Care Reviewed With: patient   Goal Outcome Evaluation:  Plan of Care Reviewed With: patient        Progress: improving   Pt able to demonstrate and describe precautions along with brace wearing schedule. Pt was in chair with brace on and all transfers performs with CGA to SBA with FWW. Pt ambulated over 300 ft and was able to perform 3 ascending and descending steps with HHA to simulate no hand rails at home and husbands assist. Pt plans home and was provided FWW and bed side commode and filled out correct paper work with PT. Pt plans home with HH. Pt now has all DME.

## 2021-08-23 ENCOUNTER — HOME HEALTH ADMISSION (OUTPATIENT)
Dept: HOME HEALTH SERVICES | Facility: HOME HEALTHCARE | Age: 46
End: 2021-08-23

## 2021-08-23 ENCOUNTER — TRANSCRIBE ORDERS (OUTPATIENT)
Dept: HOME HEALTH SERVICES | Facility: HOME HEALTHCARE | Age: 46
End: 2021-08-23

## 2021-08-23 ENCOUNTER — TELEPHONE (OUTPATIENT)
Dept: NEUROSURGERY | Facility: CLINIC | Age: 46
End: 2021-08-23

## 2021-08-23 VITALS
RESPIRATION RATE: 16 BRPM | HEART RATE: 64 BPM | SYSTOLIC BLOOD PRESSURE: 98 MMHG | DIASTOLIC BLOOD PRESSURE: 63 MMHG | WEIGHT: 210.54 LBS | BODY MASS INDEX: 33.04 KG/M2 | OXYGEN SATURATION: 97 % | TEMPERATURE: 97.2 F | HEIGHT: 67 IN

## 2021-08-23 DIAGNOSIS — M48.061 SPINAL STENOSIS OF LUMBAR REGION, UNSPECIFIED WHETHER NEUROGENIC CLAUDICATION PRESENT: Primary | ICD-10-CM

## 2021-08-23 LAB
DEPRECATED RDW RBC AUTO: 43.9 FL (ref 37–54)
ERYTHROCYTE [DISTWIDTH] IN BLOOD BY AUTOMATED COUNT: 12 % (ref 12.3–15.4)
HCT VFR BLD AUTO: 37 % (ref 34–46.6)
HGB BLD-MCNC: 12.2 G/DL (ref 12–15.9)
MCH RBC QN AUTO: 32.4 PG (ref 26.6–33)
MCHC RBC AUTO-ENTMCNC: 33 G/DL (ref 31.5–35.7)
MCV RBC AUTO: 98.1 FL (ref 79–97)
PLATELET # BLD AUTO: 183 10*3/MM3 (ref 140–450)
PMV BLD AUTO: 11.6 FL (ref 6–12)
RBC # BLD AUTO: 3.77 10*6/MM3 (ref 3.77–5.28)
WBC # BLD AUTO: 7.19 10*3/MM3 (ref 3.4–10.8)

## 2021-08-23 PROCEDURE — 97165 OT EVAL LOW COMPLEX 30 MIN: CPT

## 2021-08-23 PROCEDURE — 85027 COMPLETE CBC AUTOMATED: CPT | Performed by: NURSE PRACTITIONER

## 2021-08-23 PROCEDURE — 99024 POSTOP FOLLOW-UP VISIT: CPT | Performed by: NURSE PRACTITIONER

## 2021-08-23 PROCEDURE — 97110 THERAPEUTIC EXERCISES: CPT

## 2021-08-23 RX ORDER — METHOCARBAMOL 500 MG/1
1000 TABLET, FILM COATED ORAL EVERY 6 HOURS SCHEDULED
Qty: 40 TABLET | Refills: 1 | Status: SHIPPED | OUTPATIENT
Start: 2021-08-23 | End: 2021-09-10 | Stop reason: SDUPTHER

## 2021-08-23 RX ORDER — HYDROCODONE BITARTRATE AND ACETAMINOPHEN 7.5; 325 MG/1; MG/1
1 TABLET ORAL EVERY 4 HOURS PRN
Qty: 40 TABLET | Refills: 0 | Status: SHIPPED | OUTPATIENT
Start: 2021-08-23 | End: 2021-08-27

## 2021-08-23 RX ADMIN — HYDROCODONE BITARTRATE AND ACETAMINOPHEN 1 TABLET: 7.5; 325 TABLET ORAL at 12:11

## 2021-08-23 RX ADMIN — SODIUM CHLORIDE, PRESERVATIVE FREE 3 ML: 5 INJECTION INTRAVENOUS at 09:34

## 2021-08-23 RX ADMIN — SERTRALINE 175 MG: 100 TABLET, FILM COATED ORAL at 09:32

## 2021-08-23 RX ADMIN — BISACODYL 10 MG: 10 SUPPOSITORY RECTAL at 05:46

## 2021-08-23 RX ADMIN — DOCUSATE SODIUM 200 MG: 100 CAPSULE, LIQUID FILLED ORAL at 09:32

## 2021-08-23 RX ADMIN — METHOCARBAMOL TABLETS 1000 MG: 500 TABLET, COATED ORAL at 05:46

## 2021-08-23 RX ADMIN — HYDROCODONE BITARTRATE AND ACETAMINOPHEN 1 TABLET: 7.5; 325 TABLET ORAL at 02:59

## 2021-08-23 RX ADMIN — POLYETHYLENE GLYCOL 3350 17 G: 17 POWDER, FOR SOLUTION ORAL at 09:32

## 2021-08-23 RX ADMIN — Medication 4000 UNITS: at 09:32

## 2021-08-23 RX ADMIN — HYDROCODONE BITARTRATE AND ACETAMINOPHEN 1 TABLET: 7.5; 325 TABLET ORAL at 06:55

## 2021-08-23 RX ADMIN — METHOCARBAMOL TABLETS 1000 MG: 500 TABLET, COATED ORAL at 14:23

## 2021-08-23 RX ADMIN — CETIRIZINE HYDROCHLORIDE 10 MG: 10 TABLET ORAL at 09:32

## 2021-08-23 NOTE — PLAN OF CARE
Goal Outcome Evaluation:  Plan of Care Reviewed With: patient        Progress: improving  Outcome Summary: pt ambulate with assist of 1 person and an assitive device

## 2021-08-23 NOTE — PROGRESS NOTES
"[facility] NEUROSURGERY  PROGRESS NOTE    Spoke with patient at bedside about her concerns related to returning home postoperatively.  She has worked with physical therapy today and physical therapy reports that she is able to ambulate 350 feet with the use of a rolling walker.  They also worked ascending and descending steps in which she did well with.  She did require 2 rest breaks due to fatigue in pain/soreness which is expected.  I encouraged her to continue ambulating as this will continue to strengthen her muscles and will also help with muscle soreness.  Also encouraged her to continue taking her pain and muscle spasm medication as this will help with these things as well.  She reports requesting a hospital bed for her house as she \"has a 6-year-old who does not know boundaries\".  Informed her that I would speak with the  to see if this is a possibility.      DONNA drain removed at this time as well without difficulty.     NEIL Guerra  [facility] Neurosurgery  "

## 2021-08-23 NOTE — DISCHARGE PLACEMENT REQUEST
"Oksana Dale (45 y.o. Female)     Date of Birth Social Security Number Address Home Phone MRN    1975  [address] [address] [phone] [id number]    Buddhism Marital Status          Other        Admission Date Admission Type Admitting Provider Attending Provider Department, Room/Bed    8/20/21 Elective Brian Ojeda MD Villanueva, [place], MD [address], P880/1    Discharge Date Discharge Disposition Discharge Destination                       Attending Provider: Brian Ojeda MD    Allergies: Venlafaxine    Isolation: None   Infection: None   Code Status: CPR    Ht: 170.2 cm (67\")   Wt: 95.5 kg (210 lb 8.6 oz)    Admission Cmt: None   Principal Problem: None                Active Insurance as of 8/20/2021     Primary Coverage     Payor Plan Insurance Group Employer/Plan Group    [employer] BLUE [employer]OSS [employer] BLUE CROSS BLUE [employer]IELD PPO [id number]     Payor Plan Address Payor Plan Phone Number Payor Plan Fax Number Effective Dates    [address] [phone]  1/1/2019 - None Entered    [facility] [id number]       Subscriber Name Subscriber Birth Date Member ID       ZACKERY DALE 10/3/1973 [id number]                 Emergency Contacts      (Rel.) Home Phone Work Phone Mobile Phone    Zackery Dale (Spouse) [phone] -- --    Erica Han (Mother) -- -- [phone]    Ramsey Han (Tod) (Father) -- -- [phone]              "

## 2021-08-23 NOTE — THERAPY TREATMENT NOTE
Patient Name: Oksana Pillai  : 1975    MRN: [id number]                              Today's Date: 2021       Admit Date: 2021    Visit Dx:     ICD-10-CM ICD-9-CM   1. Herniated lumbar intervertebral disc  M51.26 722.10   2. History of spinal surgery  Z98.890 V45.89   3. Calf muscle weakness  M62.81 728.87   4. Herniation of lumbar intervertebral disc with radiculopathy  M51.16 722.10     724.4     Patient Active Problem List   Diagnosis   • Calf muscle weakness   • Intervertebral disc disorder with radiculopathy of lumbosacral region   • Chronic bilateral low back pain with right-sided sciatica   • DDD (degenerative disc disease), lumbar   • Allergic rhinitis   • Anxiety and depression   • Suspected sleep apnea   • Obesity (BMI 30-39.9)   • TMJ (temporomandibular joint disorder)   • Hypersomnia with sleep apnea   • Sleep-related bruxism   • PLMD (periodic limb movement disorder)   • History of spinal surgery   • Lumbar spondylolysis   • Chronic pain syndrome   • Degeneration of intervertebral disc at C5-C6 level   • Spinal stenosis of lumbar region with neurogenic claudication   • Herniation of lumbar intervertebral disc with radiculopathy   • Herniated lumbar intervertebral disc     Past Medical History:   Diagnosis Date   • Allergic rhinitis    • Anxiety and depression    • Back pain     RADIATES DOWN RT LEG, NUMBNESS/TINGLING   • Cervical herniated disc     GETS INJECTIONS   • Chronic bilateral low back pain with right-sided sciatica 2018   • COVID-19 vaccine series completed    • Eczema    • GI bleed 2015    Polyp found on  c-scope and GI bleed during delivery of last baby    • Keratosis pilaris    • Lumbar herniated disc    • Numbness     RIGHT FOOT-FIRST THREE TOES   • Sciatica    • TMJ pain dysfunction syndrome      Past Surgical History:   Procedure Laterality Date   • CHOLECYSTECTOMY     • COLONOSCOPY  2018   • LUMBAR DISCECTOMY FUSION INSTRUMENTATION Right 2017    Procedure:  Right L5/S1 Metrx Microdiscectomy;  Surgeon: Brian Ojeda MD;  Location: [facility] MAIN OR;  Service:    • TONSILLECTOMY       General Information     Row Name 08/23/21 1138          Physical Therapy Time and Intention    Document Type  therapy note (daily note)  -MS     Mode of Treatment  physical therapy;individual therapy  -MS     Row Name 08/23/21 1138          General Information    Patient Profile Reviewed  yes  -MS     Existing Precautions/Restrictions  (S) spinal Exit alarm; Back brace donned when OOB  -MS     Barriers to Rehab  none identified  -MS     Row Name 08/23/21 1138          Cognition    Orientation Status (Cognition)  oriented x 3  -MS     Row Name 08/23/21 1138          Safety Issues, Functional Mobility    Comment, Safety Issues/Impairments (Mobility)  Gait belt used for safety.  -MS       User Key  (r) = Recorded By, (t) = Taken By, (c) = Cosigned By    Initials Name Provider Type    MS Keyon Surya NHAN, PT Physical Therapist        Mobility     Row Name 08/23/21 1138          Bed Mobility    Bed Mobility  supine-sit;sit-supine  -MS     Supine-Sit [place] (Bed Mobility)  standby assist  -MS     Sit-Supine [place] (Bed Mobility)  standby assist  -MS     Assistive Device (Bed Mobility)  bed rails  -MS     Comment (Bed Mobility)  Via logroll;  Assisted pt. in donning her back brace prior to ambulation  -MS     Row Name 08/23/21 1138          Sit-Stand Transfer    Sit-Stand [place] (Transfers)  contact guard  -MS     Assistive Device (Sit-Stand Transfers)  walker, front-wheeled  -MS     Row Name 08/23/21 1138          Gait/Stairs (Locomotion)    [place] Level (Gait)  contact guard  -MS     Assistive Device (Gait)  walker, front-wheeled  -MS     Distance in Feet (Gait)  350 feet  -MS     Deviations/Abnormal Patterns (Gait)  nghia decreased;antalgic  -MS     Comment (Gait/Stairs)  x 2 standing rest breaks due to pain/soreness.  -MS       User Key  (r) = Recorded By, (t) =  Taken By, (c) = Cosigned By    Initials Name Provider Type    Surya Agee, PT Physical Therapist        Obj/Interventions    No documentation.       Goals/Plan    No documentation.       Clinical Impression     [facility] Name 08/23/21 1140          Pain    Additional Documentation  Pain Scale: Numbers Pre/Post-Treatment (Group)  -MS     Row Name 08/23/21 1140          Pain Scale: Numbers Pre/Post-Treatment    Pretreatment Pain Rating  5/10  -MS     Posttreatment Pain Rating  5/10  -MS     Pain Location - Orientation  lower  -MS     Pain Location  back  -MS     Pain Intervention(s)  Medication (See MAR);Nursing Notified;Repositioned  -MS     Row Name 08/23/21 1140          Positioning and Restraints    Pre-Treatment Position  in bed  -MS     Post Treatment Position  bed  -MS     In Bed  notified nsg;side lying right;call light within reach;exit alarm on;encouraged to call for assist;pillow between legs  -MS       User Key  (r) = Recorded By, (t) = Taken By, (c) = Cosigned By    Initials Name Provider Type    Surya Agee, PT Physical Therapist        Outcome Measures     [facility] Name 08/23/21 1140          How much help from another person do you currently need...    Turning from your back to your side while in flat bed without using bedrails?  3  -MS     Moving from lying on back to sitting on the side of a flat bed without bedrails?  3  -MS     Moving to and from a bed to a chair (including a wheelchair)?  3  -MS     Standing up from a chair using your arms (e.g., wheelchair, bedside chair)?  3  -MS     Climbing 3-5 steps with a railing?  3  -MS     To walk in hospital room?  3  -MS     AM-PAC 6 Clicks Score (PT)  18  -MS     [facility] Name 08/23/21 1140          Functional Assessment    Outcome Measure Options  AM-PAC 6 Clicks Basic Mobility (PT)  -MS       User Key  (r) = Recorded By, (t) = Taken By, (c) = Cosigned By    Initials Name Provider Type    Surya Agee, PT Physical Therapist                        Physical Therapy Education                 Title: PT OT SLP Therapies (Done)     Topic: Physical Therapy (Done)     Point: Mobility training (Done)     Learning Progress Summary           Patient Acceptance, E,D, VU by MS at 8/23/2021 1141    Acceptance, E,TB, VU,NR by AE at 8/21/2021 1548                   Point: Home exercise program (Done)     Learning Progress Summary           Patient Acceptance, E,D, VU by MS at 8/23/2021 1141    Acceptance, E,TB, VU,NR by AE at 8/21/2021 1548                   Point: Body mechanics (Done)     Learning Progress Summary           Patient Acceptance, E,D, VU by MS at 8/23/2021 1141    Acceptance, E,TB, VU,NR by AE at 8/21/2021 1548                   Point: Precautions (Done)     Learning Progress Summary           Patient Acceptance, E,D, VU by MS at 8/23/2021 1141    Acceptance, E,TB, VU,NR by AE at 8/21/2021 1548                               User Key     Initials Effective Dates Name Provider Type Discipline    MS 06/16/21 -  Surya Ta, PT Physical Therapist PT    AE 06/16/21 -  Cary Cruz, PT Physical Therapist PT              PT Recommendation and Plan     Plan of Care Reviewed With: patient  Outcome Summary: Pt. able to ambulate 350 feet, CGA x 1, with use of Rwx this date. Pt. requires SBA x 1 for bed mobility (Via logroll) and CGA x 1 for sit <-> stand transfers.  Pt. also able to ascend/descend 4 stairs with CGA x 1.  x 2 standing rest breaks need during ambulation due to fatigue and pain/soreness.     Time Calculation:   PT Charges     Row Name 08/23/21 1143             Time Calculation    Start Time  1040  -MS      Stop Time  1055  -MS      Time Calculation (min)  15 min  -MS      PT Received On  08/23/21  -MS      PT - Next Appointment  08/24/21  -MS         Time Calculation- PT    Total Timed Code Minutes- PT  14 minute(s)  -MS        User Key  (r) = Recorded By, (t) = Taken By, (c) = Cosigned By    Initials Name Provider Type    MS Ta  Surya JUSTIN, PT Physical Therapist        Therapy Charges for Today     Code Description Service Date Service Provider Modifiers Qty    58058130082 HC PT THER PROC EA 15 MIN 8/23/2021 Surya Ta, PT GP 1          PT G-Codes  Outcome Measure Options: AM-PAC 6 Clicks Basic Mobility (PT)  AM-PAC 6 Clicks Score (PT): 18    Surya Ta, PT  8/23/2021

## 2021-08-23 NOTE — DISCHARGE INSTR - ACTIVITY
Acetaminophen; Hydrocodone tablets or capsules  What is this medicine?  ACETAMINOPHEN; HYDROCODONE (a set a JUAN DAVID keesha fen; amando droe KOE done) is a pain reliever. It is used to treat moderate to severe pain.  This medicine may be used for other purposes; ask your health care provider or pharmacist if you have questions.  COMMON BRAND NAME(S): Anexsia, Bancap HC, Ceta-Plus, Co-Gesic, Comfortpak, Dolagesic, Dolorex Forte, DuoCet, Hydrocet, Hydrogesic, Lorcet, Lorcet HD, Lorcet Plus, Lortab, Margesic H, Maxidone, Norco, Polygesic, Stagesic, Vanacet, Verdrocet, Vicodin, Vicodin ES, Vicodin HP, Xodol, Zydone  What should I tell my health care provider before I take this medicine?  They need to know if you have any of these conditions:  · brain tumor  · Crohn's disease, inflammatory bowel disease, or ulcerative colitis  · drug abuse or addiction  · head injury  · heart or circulation problems  · if you often drink alcohol  · kidney disease or problems going to the bathroom  · liver disease  · lung disease, asthma, or breathing problems  · an unusual or allergic reaction to acetaminophen, hydrocodone, other opioid analgesics, other medicines, foods, dyes, or preservatives  · pregnant or trying to get pregnant  · breast-feeding  How should I use this medicine?  Take this medicine by mouth with a glass of water. Follow the directions on the prescription label. You can take it with or without food. If it upsets your stomach, take it with food. Do not take your medicine more often than directed.  A special MedGuide will be given to you by the pharmacist with each prescription and refill. Be sure to read this information carefully each time.  Talk to your pediatrician regarding the use of this medicine in children. Special care may be needed.  Overdosage: If you think you have taken too much of this medicine contact a poison control center or emergency room at once.  NOTE: This medicine is only for you. Do not share this medicine  with others.  What if I miss a dose?  If you miss a dose, take it as soon as you can. If it is almost time for your next dose, take only that dose. Do not take double or extra doses.  What may interact with this medicine?  This medicine may interact with the following medications:  · alcohol  · antiviral medicines for HIV or AIDS  · atropine  · antihistamines for allergy, cough and cold  · certain antibiotics like erythromycin, clarithromycin  · certain medicines for anxiety or sleep  · certain medicines for bladder problems like oxybutynin, tolterodine  · certain medicines for depression like amitriptyline, fluoxetine, sertraline  · certain medicines for fungal infections like ketoconazole and itraconazole  · certain medicines for Parkinson's disease like benztropine, trihexyphenidyl  · certain medicines for seizures like carbamazepine, phenobarbital, phenytoin, primidone  · certain medicines for stomach problems like dicyclomine, hyoscyamine  · certain medicines for travel sickness like scopolamine  · general anesthetics like halothane, isoflurane, methoxyflurane, propofol  · ipratropium  · local anesthetics like lidocaine, pramoxine, tetracaine  · MAOIs like Carbex, Eldepryl, Marplan, Nardil, and Parnate  · medicines that relax muscles for surgery  · other medicines with acetaminophen  · other narcotic medicines for pain or cough  · phenothiazines like chlorpromazine, mesoridazine, prochlorperazine, thioridazine  · rifampin  This list may not describe all possible interactions. Give your health care provider a list of all the medicines, herbs, non-prescription drugs, or dietary supplements you use. Also tell them if you smoke, drink alcohol, or use illegal drugs. Some items may interact with your medicine.  What should I watch for while using this medicine?  Tell your health care provider if your pain does not go away, if it gets worse, or if you have new or a different type of pain. You may develop tolerance to  this drug. Tolerance means that you will need a higher dose of the drug for pain relief. Tolerance is normal and is expected if you take this drug for a long time.  There are different types of narcotic drugs (opioids) for pain. If you take more than one type at the same time, you may have more side effects. Give your health care provider a list of all drugs you use. He or she will tell you how much drug to take. Do not take more drug than directed. Get emergency help right away if you have problems breathing.  Do not suddenly stop taking your drug because you may develop a severe reaction. Your body becomes used to the drug. This does NOT mean you are addicted. Addiction is a behavior related to getting and using a drug for a nonmedical reason. If you have pain, you have a medical reason to take pain drug. Your health care provider will tell you how much drug to take. If your health care provider wants you to stop the drug, the dose will be slowly lowered over time to avoid any side effects.  Talk to your health care provider about naloxone and how to get it. Naloxone is an emergency drug used for an opioid overdose. An overdose can happen if you take too much opioid. It can also happen if an opioid is taken with some other drugs or substances, like alcohol. Know the symptoms of an overdose, like trouble breathing, unusually tired or sleepy, or not being able to respond or wake up. Make sure to tell caregivers and close contacts where it is stored. Make sure they know how to use it. After naloxone is given, you must get emergency help right away. Naloxone is a temporary treatment. Repeat doses may be needed.  Do not take other drugs that contain acetaminophen with this drug. Many non-prescription drugs contain acetaminophen. Always read labels carefully. If you have questions, ask your health care provider.  If you take too much acetaminophen, get medical help right away. Too much acetaminophen can be very  dangerous and cause liver damage. Even if you do not have symptoms, it is important to get help right away.  You may get drowsy or dizzy. Do not drive, use machinery, or do anything that needs mental alertness until you know how this drug affects you. Do not stand up or sit up quickly, especially if you are an older patient. This reduces the risk of dizzy or fainting spells. Alcohol may interfere with the effect of this drug. Avoid alcoholic drinks.  This drug will cause constipation. If you do not have a bowel movement for 3 days, call your health care provider.  Your mouth may get dry. Chewing sugarless gum or sucking hard candy and drinking plenty of water may help. Contact your health care provider if the problem does not go away or is severe.  What side effects may I notice from receiving this medicine?  Side effects that you should report to your doctor or health care professional as soon as possible:  · allergic reactions like skin rash, itching or hives, swelling of the face, lips, or tongue  · breathing problems  · confusion  · redness, blistering, peeling or loosening of the skin, including inside the mouth  · signs and symptoms of low blood pressure like dizziness; feeling faint or lightheaded, falls; unusually weak or tired  · trouble passing urine or change in the amount of urine  · yellowing of the eyes or skin  Side effects that usually do not require medical attention (report to your doctor or health care professional if they continue or are bothersome):  · constipation  · dry mouth  · nausea, vomiting  · tiredness  This list may not describe all possible side effects. Call your doctor for medical advice about side effects. You may report side effects to FDA at 5-479-FDA-7377.  Where should I keep my medicine?  Keep out of the reach of children. This medicine can be abused. Keep your medicine in a safe place to protect it from theft. Do not share this medicine with anyone. Selling or giving away this  medicine is dangerous and against the law.  Store at room temperature between 15 and 30 degrees C (59 and 86 degrees F).  This medicine may cause harm and death if it is taken by other adults, children, or pets. Return medicine that has not been used to an official disposal site. Contact the [facility] at [phone] or your [facility] government to find a site. If you cannot return the medicine, flush it down the toilet. Do not use the medicine after the expiration date.  NOTE: This sheet is a summary. It may not cover all possible information. If you have questions about this medicine, talk to your doctor, pharmacist, or health care provider.  © 2021 [employer] Standard (2020-07-27 12:05:41)     How to Use an Incentive Spirometer    An incentive spirometer is a tool that measures how well you are filling your lungs with each breath. Learning to take long, deep breaths using this tool can help you keep your lungs clear and active. This may help to reverse or lessen your chance of developing breathing (pulmonary) problems, especially infection. You may be asked to use a spirometer:  · After a surgery.  · If you have a lung problem or a history of smoking.  · After a long period of time when you have been unable to move or be active.  If the spirometer includes an indicator to show the highest number that you have reached, your health care provider or respiratory therapist will help you set a goal. Keep a log of your progress as told by your health care provider.  What are the risks?  · Breathing too quickly may cause dizziness or cause you to pass out. Take your time so you do not get dizzy or light-headed.  · If you are in pain, you may need to take pain medicine before doing incentive spirometry. It is harder to take a deep breath if you are having pain.  How to use your incentive spirometer       1. Sit up on the edge of your bed or on a chair.  2. Hold the incentive spirometer so that it is in an upright  position.  3. Before you use the spirometer, breathe out normally.  4. Place the mouthpiece in your mouth. Make sure your lips are closed tightly around it.  5. Breathe in slowly and as deeply as you can through your mouth, causing the piston or the ball to rise toward the top of the chamber.  6. Hold your breath for 3-5 seconds, or for as long as possible.  ? If the spirometer includes a  indicator, use this to guide you in breathing. Slow down your breathing if the indicator goes above the marked areas.  7. Remove the mouthpiece from your mouth and breathe out normally. The piston or ball will return to the bottom of the chamber.  8. Rest for a few seconds, then repeat the steps 10 or more times.  ? Take your time and take a few normal breaths between deep breaths so that you do not get dizzy or light-headed.  ? Do this every 1-2 hours when you are awake.  9. If the spirometer includes a goal marker to show the highest number you have reached (best effort), use this as a goal to work toward during each repetition.  10. After each set of 10 deep breaths, cough a few times. This will help to make sure that your lungs are clear.  ? If you have an incision on your chest or abdomen from surgery, place a pillow or a rolled-up towel firmly against the incision when you cough. This can help to reduce pain while taking deep breaths and coughing.  General tips  · When you are able to get out of bed:  ? Walk around often.  ? Continue to take deep breaths and cough in order to clear your lungs.  · Keep using the incentive spirometer until your health care provider says it is okay to stop using it. If you have been in the hospital, you may be told to keep using the spirometer at home.  Contact a health care provider if:  · You are having difficulty using the spirometer.  · You have trouble using the spirometer as often as instructed.  · Your pain medicine is not giving enough relief for you to use the spirometer as  told.  · You have a fever.  Get help right away if:  · You develop shortness of breath.  · You develop a cough with bloody mucus from the lungs.  · You have fluid or blood coming from an incision site after you cough.  Summary  · An incentive spirometer is a tool that can help you learn to take long, deep breaths to keep your lungs clear and active.  · You may be asked to use a spirometer after a surgery, if you have a lung problem or a history of smoking, or if you have been inactive for a long period of time.  · Use your incentive spirometer as instructed every 1-2 hours while you are awake.  · If you have an incision on your chest or abdomen, place a pillow or a rolled-up towel firmly against your incision when you cough. This will help to reduce pain.  · Get help right away if you have shortness of breath, you cough up bloody mucus, or blood comes from your incision when you cough.  This information is not intended to replace advice given to you by your health care provider. Make sure you discuss any questions you have with your health care provider.  Document Revised: 03/08/2021 Document Reviewed: 03/08/2021  "[employer]" Patient Education © 2021 [employer]     Methocarbamol tablets  What is this medicine?  METHOCARBAMOL (meth oh DIDI ba mole) helps to relieve pain and stiffness in muscles caused by strains, sprains, or other injury to your muscles.  This medicine may be used for other purposes; ask your health care provider or pharmacist if you have questions.  COMMON BRAND NAME(S): Robaxin  What should I tell my health care provider before I take this medicine?  They need to know if you have any of these conditions:  · kidney disease  · seizures  · an unusual or allergic reaction to methocarbamol, other medicines, foods, dyes, or preservatives  · pregnant or trying to get pregnant  · breast-feeding  How should I use this medicine?  Take this medicine by mouth with a full glass of water. Follow the directions on  the prescription label. Take your medicine at regular intervals. Do not take your medicine more often than directed.  Talk to your pediatrician regarding the use of this medicine in children. Special care may be needed.  Overdosage: If you think you have taken too much of this medicine contact a poison control center or emergency room at once.  NOTE: This medicine is only for you. Do not share this medicine with others.  What if I miss a dose?  If you miss a dose, take it as soon as you can. If it is almost time for your next dose, take only the next dose. Do not take double or extra doses.  What may interact with this medicine?  Do not take this medication with any of the following medicines:  · narcotic medicines for cough  This medicine may also interact with the following medications:  · alcohol  · antihistamines for allergy, cough and cold  · certain medicines for anxiety or sleep  · certain medicines for depression like amitriptyline, fluoxetine, sertraline  · certain medicines for seizures like phenobarbital, primidone  · cholinesterase inhibitors like neostigmine, ambenonium, and pyridostigmine bromide  · general anesthetics like halothane, isoflurane, methoxyflurane, propofol  · local anesthetics like lidocaine, pramoxine, tetracaine  · medicines that relax muscles for surgery  · narcotic medicines for pain  · phenothiazines like chlorpromazine, mesoridazine, prochlorperazine, thioridazine  This list may not describe all possible interactions. Give your health care provider a list of all the medicines, herbs, non-prescription drugs, or dietary supplements you use. Also tell them if you smoke, drink alcohol, or use illegal drugs. Some items may interact with your medicine.  What should I watch for while using this medicine?  Tell your doctor or health care professional if your symptoms do not start to get better or if they get worse.  You may get drowsy or dizzy. Do not drive, use machinery, or do anything  that needs mental alertness until you know how this medicine affects you. Do not stand or sit up quickly, especially if you are an older patient. This reduces the risk of dizzy or fainting spells. Alcohol may interfere with the effect of this medicine. Avoid alcoholic drinks.  If you are taking another medicine that also causes drowsiness, you may have more side effects. Give your health care provider a list of all medicines you use. Your doctor will tell you how much medicine to take. Do not take more medicine than directed. Call emergency for help if you have problems breathing or unusual sleepiness.  What side effects may I notice from receiving this medicine?  Side effects that you should report to your doctor or health care professional as soon as possible:  · allergic reactions like skin rash, itching or hives, swelling of the face, lips, or tongue  · breathing problems  · confusion  · seizures  · unusually weak or tired  Side effects that usually do not require medical attention (report to your doctor or health care professional if they continue or are bothersome):  · dizziness  · headache  · metallic taste  · tiredness  · upset stomach  This list may not describe all possible side effects. Call your doctor for medical advice about side effects. You may report side effects to FDA at 7-972-FDA-6044.  Where should I keep my medicine?  Keep out of the reach of children.  Store at room temperature between 20 and 25 degrees C (68 and 77 degrees F). Keep container tightly closed. Throw away any unused medicine after the expiration date.  NOTE: This sheet is a summary. It may not cover all possible information. If you have questions about this medicine, talk to your doctor, pharmacist, or health care provider.  © 2021 [employer]vier/Gold Standard (2016-09-27 13:11:54)

## 2021-08-23 NOTE — CASE MANAGEMENT/SOCIAL WORK
Discharge Planning Assessment  [facility]     Patient Name: Oksana Pillai  MRN: [id number]  Today's Date: 8/23/2021    Admit Date: 8/20/2021    Discharge Needs Assessment     Row Name 08/23/21 0956       Living Environment    Lives With  spouse;child(lexie), dependent;other relative(s)    Current Living Arrangements  home/apartment/condo    Primary Care Provided by  self    Provides Primary Care For  no one    Family Caregiver if Needed  spouse;other relative(s)    Quality of Family Relationships  helpful;involved;supportive    Able to Return to Prior Arrangements  yes       Resource/Environmental Concerns    Transportation Concerns  car, none       Transition Planning    Patient/Family Anticipates Transition to  home with family;home with help/services    Patient/Family Anticipated Services at Transition      Transportation Anticipated  family or friend will provide       Discharge Needs Assessment    Readmission Within the Last 30 Days  no previous admission in last 30 days    Equipment Currently Used at Home  none    Discharge Facility/Level of Care Needs  home with home health    Provided Post Acute Provider List?  Yes    Post Acute Provider List  Home Health        Discharge Plan     Row Name 08/23/21 0957       Plan    Plan  Home with family support & [facility].    Patient/Family in Agreement with Plan  yes    Plan Comments  Spoke with the patient, verified current information and explained the role of the CCP. Patient said she has family support. She's IADL and has no history with DME/RH/HH. Physical Therapy eval/rec noted. Patient said she plans to d/c home with family support &  PT. She requests [facility]. Referral sent in [facility]. Patient also said she plans for her family to transport her home by car at d/c. No other needs identified.        Continued Care and Services - Admitted Since 8/20/2021    Coordination has not been started for this encounter.         Demographic Summary     Row Name 08/23/21  0955       General Information    Admission Type  inpatient    Reason for Consult  discharge planning    Preferred Language  English     Used During This Interaction  no       Contact Information    Permission Granted to Share Info With  ;family/designee        Functional Status     Row Name 08/23/21 0955       Functional Status    Usual Activity Tolerance  good    Current Activity Tolerance  good       Functional Status, IADL    Medications  independent    Meal Preparation  independent    Housekeeping  independent    Laundry  independent    Shopping  independent       Mental Status Summary    Recent Changes in Mental Status/Cognitive Functioning  no changes        Psychosocial     Row Name 08/23/21 0956       Intellectual Performance WDL    Level of Consciousness  Alert       Coping/Stress    Patient Personal Strengths  able to adapt    Sources of Support  other family members;spouse    Reaction to Health Status  accepting    Understanding of Condition and Treatment  adequate understanding of medical condition       Developmental Stage (Eriksson's)    Developmental Stage  Stage 7 (35-65 years/Middle Adulthood) Generativity vs. Stagnation        Abuse/Neglect    No documentation.       Legal    No documentation.       Substance Abuse    No documentation.       Patient Forms    No documentation.           Renee Pickett RN

## 2021-08-23 NOTE — PLAN OF CARE
Pt admitted to [facility] 2/2 to L5-S1 decompression and fusion (820/21). Pt reports indep. In her home environment and lives with her  and children. Pt up mobilizing in hallway this morning. Pt reports she is able to doff/angel brace with no assist. She is transferring on/off commode with supervision. Supervision for mobility in her hospital room. No hands on assist for LB dressing, all while pt able to maintain her spinal precautions. Education provided on home safety, DME/AE to maintain indep. OT to s/o, pt with plans to d/c home with HH OT/PT.    Patient was wearing a face mask during this therapy encounter. Therapist used appropriate personal protective equipment including mask, goggles and gloves. Mask used was standard procedure mask. Appropriate PPE was worn during the entire therapy session. Hand hygiene was completed before and after therapy session. Patient is not in enhanced droplet precautions.

## 2021-08-23 NOTE — THERAPY DISCHARGE NOTE
Acute Care - Occupational Therapy Discharge  [facility]    Patient Name: Oksana Pillai  : 1975    MRN: [id number]                              Today's Date: 2021       Admit Date: 2021    Visit Dx:     ICD-10-CM ICD-9-CM   1. Herniated lumbar intervertebral disc  M51.26 722.10   2. History of spinal surgery  Z98.890 V45.89   3. Calf muscle weakness  M62.81 728.87   4. Herniation of lumbar intervertebral disc with radiculopathy  M51.16 722.10     724.4     Patient Active Problem List   Diagnosis   • Calf muscle weakness   • Intervertebral disc disorder with radiculopathy of lumbosacral region   • Chronic bilateral low back pain with right-sided sciatica   • DDD (degenerative disc disease), lumbar   • Allergic rhinitis   • Anxiety and depression   • Suspected sleep apnea   • Obesity (BMI 30-39.9)   • TMJ (temporomandibular joint disorder)   • Hypersomnia with sleep apnea   • Sleep-related bruxism   • PLMD (periodic limb movement disorder)   • History of spinal surgery   • Lumbar spondylolysis   • Chronic pain syndrome   • Degeneration of intervertebral disc at C5-C6 level   • Spinal stenosis of lumbar region with neurogenic claudication   • Herniation of lumbar intervertebral disc with radiculopathy   • Herniated lumbar intervertebral disc     Past Medical History:   Diagnosis Date   • Allergic rhinitis    • Anxiety and depression    • Back pain     RADIATES DOWN RT LEG, NUMBNESS/TINGLING   • Cervical herniated disc     GETS INJECTIONS   • Chronic bilateral low back pain with right-sided sciatica 2018   • COVID-19 vaccine series completed    • Eczema    • GI bleed 2015    Polyp found on  c-scope and GI bleed during delivery of last baby    • Keratosis pilaris    • Lumbar herniated disc    • Numbness     RIGHT FOOT-FIRST THREE TOES   • Sciatica    • TMJ pain dysfunction syndrome      Past Surgical History:   Procedure Laterality Date   • CHOLECYSTECTOMY     • COLONOSCOPY  2018   • LUMBAR  DISCECTOMY FUSION INSTRUMENTATION Right 4/21/2017    Procedure: Right L5/S1 Metrx Microdiscectomy;  Surgeon: Brian Ojeda MD;  Location: [facility] MAIN OR;  Service:    • TONSILLECTOMY       General Information     Row Name 08/23/21 1228          OT Time and Intention    Document Type  discharge evaluation/summary  -RB     Mode of Treatment  individual therapy;occupational therapy  -RB     Row Name 08/23/21 1228          General Information    Patient Profile Reviewed  yes  -RB     Prior Level of Function  independent:  -RB     Existing Precautions/Restrictions  spinal  -RB     Barriers to Rehab  none identified  -RB     Row Name 08/23/21 1228          Living Environment    Lives With  child(lexie), dependent;spouse;other relative(s)  -RB     Row Name 08/23/21 1228          Home Main Entrance    Number of Stairs, Main Entrance  two  -RB     Row Name 08/23/21 1228          Stairs Within Home, Primary    Number of Stairs, Within Home, Primary  none  -RB     Row Name 08/23/21 1228          Cognition    Orientation Status (Cognition)  oriented x 3  -RB     Row Name 08/23/21 1228          Safety Issues, Functional Mobility    Impairments Affecting Function (Mobility)  range of motion (ROM);pain;strength  -RB       User Key  (r) = Recorded By, (t) = Taken By, (c) = Cosigned By    Initials Name Provider Type    RB Olive Sneed OT Occupational Therapist        Mobility/ADL's     Row Name 08/23/21 1229          Bed Mobility    Comment (Bed Mobility)  UIC  -RB     Row Name 08/23/21 1229          Transfers    Transfers  sit-stand transfer;toilet transfer  -RB     Sit-Stand [place] (Transfers)  supervision  -RB     [place] Level (Toilet Transfer)  supervision  -RB     Assistive Device (Toilet Transfer)  walker, front-wheeled  -RB     Row Name 08/23/21 1229          Sit-Stand Transfer    Assistive Device (Sit-Stand Transfers)  walker, front-wheeled  -RB     Row Name 08/23/21 1229          Toilet Transfer    Type  (Toilet Transfer)  stand-sit;sit-stand  -RB     Row Name 08/23/21 1229          Functional Mobility    Functional Mobility- Ind. Level  supervision required  -RB     Functional Mobility- Device  rolling walker  -RB     Functional Mobility- Comment  able to maintain spinal precautions  -RB     Row Name 08/23/21 1229          Activities of Daily Living    BADL Assessment/Intervention  lower body dressing;toileting  -RB     Row Name 08/23/21 1229          Lower Body Dressing Assessment/Training    [place] Level (Lower Body Dressing)  don;doff;socks;supervision  -RB     Position (Lower Body Dressing)  supported sitting  -RB     Row Name 08/23/21 1229          Toileting Assessment/Training    [place] Level (Toileting)  toileting skills;supervision  -RB     Position (Toileting)  supported sitting;supported standing  -RB       User Key  (r) = Recorded By, (t) = Taken By, (c) = Cosigned By    Initials Name Provider Type    RB Olive Sneed OT Occupational Therapist        Obj/Interventions     Row Name 08/23/21 1231          Sensory Assessment (Somatosensory)    Sensory Assessment (Somatosensory)  other (see comments) some slight numbness/tingling in back of R leg and distal feet  -RB     Row Name 08/23/21 1231          Vision Assessment/Intervention    Visual Impairment/Limitations  WFL  -RB     Row Name 08/23/21 1231          Range of Motion Comprehensive    Comment, General Range of Motion  BUE WFL  -RB     Row Name 08/23/21 1231          Strength Comprehensive (MMT)    Comment, General Manual Muscle Testing (MMT) Assessment  BUE 4/5  -RB     Row Name 08/23/21 1231          Balance    Balance Assessment  sitting static balance;standing static balance;standing dynamic balance  -RB     Static Sitting Balance  WFL;supported  -RB     Static Standing Balance  WFL;supported  -RB     Dynamic Standing Balance  supported;mild impairment  -RB     Balance Interventions  occupation based/functional task  -RB      Comment, Balance  Walker used for support  -RB       User Key  (r) = Recorded By, (t) = Taken By, (c) = Cosigned By    Initials Name Provider Type    Olive Rios OT Occupational Therapist        Goals/Plan    No documentation.       Clinical Impression     Row Name 08/23/21 1233          Pain Scale: Numbers Pre/Post-Treatment    Pretreatment Pain Rating  4/10  -RB     Posttreatment Pain Rating  4/10  -RB     Pain Location - Orientation  posterior  -RB     Pain Location  back  -RB     Pain Intervention(s)  Repositioned;Ambulation/increased activity;Rest  -RB     Row Name 08/23/21 1233          Plan of Care Review    Plan of Care Reviewed With  patient  -RB     Progress  no change  -RB     Row Name 08/23/21 1233          Therapy Assessment/Plan (OT)    Criteria for Skilled Therapeutic Interventions Met (OT)  no;does not meet criteria for skilled intervention completing tasks with supervision.  -RB     Therapy Frequency (OT)  evaluation only  -RB     Row Name 08/23/21 1233          Therapy Plan Review/Discharge Plan (OT)    Anticipated Discharge Disposition (OT)  home with assist;home with home health  -RB     Row Name 08/23/21 1233          Vital Signs    O2 Delivery Pre Treatment  room air  -RB     O2 Delivery Intra Treatment  room air  -RB     O2 Delivery Post Treatment  room air  -RB     Pre Patient Position  Standing  -RB     Intra Patient Position  Standing  -RB     Post Patient Position  Sitting  -RB     Row Name 08/23/21 1233          Positioning and Restraints    Pre-Treatment Position  standing in room  -RB     Post Treatment Position  bathroom  -RB     Bathroom  with nsg;encouraged to call for assist;sitting  -RB       User Key  (r) = Recorded By, (t) = Taken By, (c) = Cosigned By    Initials Name Provider Type    Olive Rios OT Occupational Therapist        Outcome Measures     Row Name 08/23/21 1236          How much help from another is currently needed...    Putting on and taking off  regular lower body clothing?  4  -RB     Bathing (including washing, rinsing, and drying)  4  -RB     Toileting (which includes using toilet bed pan or urinal)  4  -RB     Putting on and taking off regular upper body clothing  4  -RB     Taking care of personal grooming (such as brushing teeth)  4  -RB     Eating meals  4  -RB     AM-PAC 6 Clicks Score (OT)  24  -RB     Row Name 08/23/21 1140          How much help from another person do you currently need...    Turning from your back to your side while in flat bed without using bedrails?  3  -MS     Moving from lying on back to sitting on the side of a flat bed without bedrails?  3  -MS     Moving to and from a bed to a chair (including a wheelchair)?  3  -MS     Standing up from a chair using your arms (e.g., wheelchair, bedside chair)?  3  -MS     Climbing 3-5 steps with a railing?  3  -MS     To walk in hospital room?  3  -MS     AM-PAC 6 Clicks Score (PT)  18  -MS     Row Name 08/23/21 1236 08/23/21 1140       Functional Assessment    Outcome Measure Options  AM-PAC 6 Clicks Daily Activity (OT)  -RB  AM-PAC 6 Clicks Basic Mobility (PT)  -MS      User Key  (r) = Recorded By, (t) = Taken By, (c) = Cosigned By    Initials Name Provider Type    Surya Agee, PT Physical Therapist    RB Olive Sneed, OT Occupational Therapist        Occupational Therapy Education                 Title: PT OT SLP Therapies (In Progress)     Topic: Occupational Therapy (Not Started)     Point: ADL training (Not Started)     Description:   Instruct learner(s) on proper safety adaptation and remediation techniques during self care or transfers.   Instruct in proper use of assistive devices.              Learner Progress:  Not documented in this visit.          Point: Home exercise program (Not Started)     Description:   Instruct learner(s) on appropriate technique for monitoring, assisting and/or progressing therapeutic exercises/activities.              Learner Progress:   Not documented in this visit.          Point: Precautions (Not Started)     Description:   Instruct learner(s) on prescribed precautions during self-care and functional transfers.              Learner Progress:  Not documented in this visit.          Point: Body mechanics (Not Started)     Description:   Instruct learner(s) on proper positioning and spine alignment during self-care, functional mobility activities and/or exercises.              Learner Progress:  Not documented in this visit.                          OT Recommendation and Plan  Retired Outcome Summary/Treatment Plan (OT)  Anticipated Discharge Disposition (OT): home with assist, home with home health  Therapy Frequency (OT): evaluation only  Plan of Care Review  Plan of Care Reviewed With: patient  Progress: no change  Plan of Care Reviewed With: patient     Time Calculation:   Time Calculation- OT     Row Name 08/23/21 1227             Time Calculation- OT    OT Start Time  0845  -RB      OT Stop Time  0855  -RB      OT Time Calculation (min)  10 min  -RB      OT Received On  08/23/21  -RB         Untimed Charges    OT Eval/Re-eval Minutes  10  -RB         Total Minutes    Untimed Charges Total Minutes  10  -RB       Total Minutes  10  -RB        User Key  (r) = Recorded By, (t) = Taken By, (c) = Cosigned By    Initials Name Provider Type    RB Olive Sneed OT Occupational Therapist        Therapy Charges for Today     Code Description Service Date Service Provider Modifiers Qty    83505683714 HC OT EVAL LOW COMPLEXITY 2 8/23/2021 Olive Sneed OT GO 1               Olive Sneed OT  8/23/2021

## 2021-08-23 NOTE — DISCHARGE SUMMARY
Oksana Rosas  1975    Patient Care Team:  Olinda Winters MD as PCP - General (Family Medicine)  Brian Ojeda MD as Surgeon (Neurosurgery)  Marissa Avendano MD as Consulting Physician (Gastroenterology)  Kristyn Fontaine MD as Consulting Physician (Obstetrics and Gynecology)  Iggy Bartlett MD as Consulting Physician (Pain Medicine)    Date of Admit: 8/20/2021    Date of Discharge:  8/23/2021    Discharge Diagnosis:  Calf muscle weakness    History of spinal surgery    Herniation of lumbar intervertebral disc with radiculopathy    Herniated lumbar intervertebral disc      Procedures Performed  Procedure(s):  Open lumbar decompression/discectomy lumbar five/sacral one with posterior lumbar interbody fusion with cages and transcortical screw/pete fixation using the TELOSor robot       Complications: NONE    Consultants:   Consults     No orders found from 7/22/2021 to 8/21/2021.          Condition on Discharge: stable    Discharge disposition: home    Pertinent Test Results: NONE    Brief HPI: Patient evaluated in office for complaints of pain and severe gastrocnemius weakness.  She recently underwent a right L5-S1 discectomy a few years ago.  She presented at this time with motor deficit and significant pain.  She eventually got better and the motor deficit resolved but she had recurrence of pain and severe gastrocnemius weakness due to a recurrent herniated disc with disc space collapse. MRI imaging revealed enhancing tissue in the epidural space anterolateral and lateral to the thecal sac at L5-S1 abutting the right S1 nerve root. RBAs of treatment were discussed including the above procedure. Patient consented to above procedure.    Hospital Course: Patient admitted for above procedure. The procedure itself was without complication. The patient was discharged home following recovery.     Discharge Physical Exam:    Temp:  [97.2 °F (36.2 °C)-98.4 °F (36.9 °C)] 97.2 °F (36.2 °C)  Heart Rate:  [61-75]  64  Resp:  [16] 16  BP: ()/(61-73) 98/63    Current labs:  Lab Results (last 24 hours)     Procedure Component Value Units Date/Time    CBC (No Diff) [[id number]]  (Abnormal) Collected: 08/23/21 0557    Specimen: Blood Updated: 08/23/21 0641     WBC 7.19 10*3/mm3      RBC 3.77 10*6/mm3      Hemoglobin 12.2 g/dL      Hematocrit 37.0 %      MCV 98.1 fL      MCH 32.4 pg      MCHC 33.0 g/dL      RDW 12.0 %      RDW-SD 43.9 fl      MPV 11.6 fL      Platelets 183 10*3/mm3             General Appearance No acute distress.   HEENT NC/AT   Neurological Awake, Alert, and oriented x 3   Cranial nerves CN II-XII intact   Motor  5/5 strength throughout.   Sensory Intact to light touch    Gait and station Ambulating with minimal assist   Neck Supple, trachea midline, no carotid bruit   Back  lumbar incision well approximated with Steri-Strips in place.  No evidence of redness, swelling, drainage.  DONNA drain removed-removal site with no evidence of drainage.  New dressing in place.   Extremities Moves all extremities well, no edema, no cyanosis, no redness         Discharge Medications  James has been reviewed and narcotic consent is on file in the patient's chart.     Your medication list      START taking these medications      Instructions Last Dose Given Next Dose Due   HYDROcodone-acetaminophen 7.5-325 MG per tablet  Commonly known as: NORCO  Replaces: HYDROcodone-acetaminophen 5-325 MG per tablet      Take 1 tablet by mouth Every 4 (Four) Hours As Needed for Moderate Pain  for up to 4 days.       methocarbamol 500 MG tablet  Commonly known as: ROBAXIN      Take 2 tablets by mouth Every 6 (Six) Hours.          CONTINUE taking these medications      Instructions Last Dose Given Next Dose Due   busPIRone 10 MG tablet  Commonly known as: BUSPAR      Take 10 mg by mouth. PT STATES HASN'T STARTED YET       CALCIUM 1200 PO      Take 1 tablet by mouth Daily.       fexofenadine 60 MG tablet  Commonly known as: ALLEGRA      Take  60 mg by mouth Daily.       fish oil 1000 MG capsule capsule      Take 1,000 mg by mouth Daily With Breakfast. PT HOLDING FOR SURGERY       levonorgestrel 20 MCG/24HR IUD  Commonly known as: MIRENA      1 each by Intrauterine route 1 (One) Time.       LORazepam 2 MG tablet  Commonly known as: ATIVAN      Take 2 mg by mouth As Needed.       MAGNESIUM PO      Take 1 tablet by mouth Daily.       multivitamin tablet tablet      Take 1 tablet by mouth Daily.       Nurtec 75 MG tablet dispersible tablet  Generic drug: Rimegepant Sulfate      Take 75 mg by mouth Daily As Needed.       POTASSIUM GLUCONATE PO      Take 1 tablet by mouth Daily.       sertraline 100 MG tablet  Commonly known as: ZOLOFT      Take 175 mg by mouth Daily.       VITAMIN D3 PO      Take 2 tablets by mouth Daily.       vitamin E 400 UNIT capsule      Take 400 Units by mouth Daily. PT HOLDING FOR SURGERY       Xhance 93 MCG/ACT Exhaler Suspension  Generic drug: Fluticasone Propionate      2 sprays into the nostril(s) as directed by provider 2 (two) times a day.          STOP taking these medications    Chlorhexidine Gluconate Cloth 2 % pads        cyclobenzaprine 10 MG tablet  Commonly known as: FLEXERIL        HYDROcodone-acetaminophen 5-325 MG per tablet  Commonly known as: NORCO  Replaced by: HYDROcodone-acetaminophen 7.5-325 MG per tablet              Where to Get Your Medications      These medications were sent to [facility] [address] [place] [address] - [address] [address] - [phone] [facility] [phone]   [address] [address]    Phone: [phone]   · HYDROcodone-acetaminophen 7.5-325 MG per tablet  · methocarbamol 500 MG tablet         Discharge Diet:     Diet Order   Procedures   • Diet Regular       Activity at Discharge:       Call for: questions or concerns    Follow-up Appointments  No future appointments.  Contact information for after-discharge care     Home Medical Care     [facility] .   "  Service: Home Health Services  Contact information:  [address]  [facility] [address] [phone]                         Test Results Pending at Discharge     None    I discussed the discharge instructions with patient, nursing staff and Dr. Ojeda, Dr. Kerrie eDlgado, APRN  08/23/21  13:01 EDT    40 min spent in reviewing records, discussion and examination of the patient and discussion with other members of the patient's medical team.    \"Dictated utilizing Dragon dictation\".    "

## 2021-08-23 NOTE — PLAN OF CARE
Goal Outcome Evaluation:  Plan of Care Reviewed With: patient           Outcome Summary: Pt. able to ambulate 350 feet, CGA x 1, with use of Rwx this date. Pt. requires SBA x 1 for bed mobility (Via logroll) and CGA x 1 for sit <-> stand transfers.  Pt. also able to ascend/descend 4 stairs with CGA x 1.  x 2 standing rest breaks need during ambulation due to fatigue and pain/soreness.    Patient was wearing a face mask during this therapy encounter. Therapist used appropriate personal protective equipment including eye protection, mask, and gloves.  Mask used was standard procedure mask. Appropriate PPE was worn during the entire therapy session. Hand hygiene was completed before and after therapy session. Patient is not in enhanced droplet precautions.

## 2021-08-23 NOTE — PLAN OF CARE
Goal Outcome Evaluation:        VSS. Stable for d/c home with home health. OT and PT cleared for steps to enter home and for flight to get to bedroom. Pt is also getting hospital bed rented for home. D/c education completed.    Marissa Braxton BSN, RN

## 2021-08-23 NOTE — PAYOR COMM NOTE
"CONTINUED STAY REVIEW  REF #[id number]  F:  [phone]        Oksana Dale (45 y.o. Female)     Date of Birth Social Security Number Address Home Phone MRN    1975  [id number] [facility] [phone] [id number]    Worship Marital Status          Other        Admission Date Admission Type Admitting Provider Attending Provider Department, Room/Bed    8/20/21 Elective Brian Ojeda MD Villanueva, Wayne, MD [address], P880/1    Discharge Date Discharge Disposition Discharge Destination                       Attending Provider: Brian Ojeda MD    Allergies: Venlafaxine    Isolation: None   Infection: None   Code Status: CPR    Ht: 170.2 cm (67\")   Wt: 95.5 kg (210 lb 8.6 oz)    Admission Cmt: None   Principal Problem: None                Active Insurance as of 8/20/2021     Primary Coverage     Payor Plan Insurance Group Employer/Plan Group    [employer] [facility] PPO [id number]     Payor Plan Address Payor Plan Phone Number Payor Plan Fax Number Effective Dates    [address] [phone]  1/1/2019 - None Entered    [facility] [id number]       Subscriber Name Subscriber Birth Date Member ID       ZACKERY DALE 10/3/1973 [id number]                 Emergency Contacts      (Rel.) Home Phone Work Phone Mobile Phone    Zackery Dale (Spouse) [phone] -- --    Erica Han (Mother) -- -- [phone]    Ramsey Han (Tod) (Father) -- -- [phone]            Vital Signs (last day)     Date/Time   Temp   Temp src   Pulse   Resp   BP   Patient Position   SpO2    08/23/21 0700   97.2 (36.2)   Skin   64   16   98/63   Lying   97    08/23/21 0310   97.8 (36.6)   Skin   61   16   106/73   Lying   94    08/22/21 2242   97.5 (36.4)   Skin   68   16   101/66   Lying   92    08/22/21 1910   97.7 (36.5)   Skin   75   16   97/64   Lying   94    08/22/21 1506   98.4 (36.9)   Oral   68   16   98/61   Lying   93    08/22/21 " 1057   98.5 (36.9)   Oral   68   16   105/67   Lying   96    08/22/21 0703   97 (36.1)   Oral   64   16   110/64   Lying   98              Oxygen Therapy (last day)     Date/Time   SpO2   Device (Oxygen Therapy)   Flow (L/min)   Oxygen Concentration (%)   ETCO2 (mmHg)    08/23/21 0700   97   room air   --   --   --    08/23/21 0310   94   room air   --   --   --    08/22/21 2242   92   room air   --   --   --    08/22/21 1910   94   room air   --   --   --    08/22/21 1506   93   room air   --   --   --    08/22/21 1057   96   room air   --   --   --    08/22/21 0703   98   room air   --   --   --              Lines, Drains & Airways    Active LDAs     Name:   Placement date:   Placement time:   Site:   Days:    Peripheral IV 08/20/21 0720 Posterior;Right Hand   08/20/21    0720    Hand   3    Closed/Suction Drain Inferior Back Bulb   08/20/21    1151    Back   2                  Medication Administration Report for Oksana Pillai as of 08/23/21 1131    Legend:    Given Hold Not Given Due Canceled Entry Other Actions    Time Time (Time) Time  Time-Action       Discontinued     Completed     Future     MAR Hold     Linked           Medications 08/21/21 08/22/21 08/23/21    cetirizine (zyrTEC) tablet 10 mg  Dose: 10 mg  Freq: Daily Route: PO  Start: 08/20/21 1355    0822          0848          0932             cholecalciferol (VITAMIN D3) tablet 4,000 Units  Dose: 4,000 Units  Freq: Daily Route: PO  Start: 08/21/21 1300    1144              0848          0932             docusate sodium (COLACE) capsule 200 mg  Dose: 200 mg  Freq: 2 Times Daily Route: PO  Start: 08/21/21 2100    Admin Instructions:   Swallow whole. Do not open, crush, or chew capsule.     2119 2551     2020 0932 2100            fluticasone (FLONASE) 50 MCG/ACT nasal spray 2 spray  Dose: 2 spray  Freq: 2 Times Daily Route: EACH NARE  Start: 08/20/21 2100    [phone]             methocarbamol (ROBAXIN) tablet 1,000 mg  Dose: 1,000 mg  Freq: Every 6 Hours Scheduled Route: PO  Start: 08/22/21 1800     1656          2337        0549     1200     1800           polyethylene glycol (MIRALAX) packet 17 g  Dose: 17 g  Freq: Daily Route: PO  Start: 08/21/21 1015    Admin Instructions:   Use 4-8 ounces of water, tea, or juice for each 17 gram dose.     1144 6674          0978             sertraline (ZOLOFT) tablet 175 mg  Dose: 175 mg  Freq: Daily Route: PO  Start: 08/20/21 1355   End: 09/14/21 0859    0822          0848          0932             sodium chloride 0.9 % flush 3 mL  Dose: 3 mL  Freq: Every 12 Hours Scheduled Route: IV  Start: 08/20/21 2100    4473 [phone] [phone]     2026 0210     2100           Completed Medications  Medications 08/21/21 08/22/21 08/23/21       ceFAZolin in dextrose (ANCEF) IVPB solution 2 g  Dose: 2 g  Freq: Every 8 Hours Route: IV  Indications of Use: PERIOPERATIVE PHARMACOPROPHYLAXIS  Start: 08/20/21 2000   End: 08/21/21 1214    Admin Instructions:   Time first dose from pre-op dose.  Caution: Look alike/sound alike drug alert     0438     1144              ceFAZolin in dextrose (ANCEF) IVPB solution 2 g  Dose: 2 g  Freq: Once Route: IV  Indications of Use: PERIOPERATIVE PHARMACOPROPHYLAXIS  Start: 08/20/21 0812   End: 08/20/21 0840    Admin Instructions:   Administer within 1 hour of surgical incision. Redose 4 hours from pre-op dose if procedure ongoing or >1.5 L blood loss.  Caution: Look alike/sound alike drug alert           famotidine (PEPCID) injection 20 mg  Dose: 20 mg  Freq: Once Route: IV  Start: 08/20/21 0725   End: 08/20/21 0804    Admin Instructions:   Dilute to 10 mL total volume and give IV push over 2 minutes.           Methocarbamol (ROBAXIN) injection 1,000 mg  Dose: 1,000 mg  Freq: Once Route: IV  Start: 08/20/21 1355   End: 08/20/21 1440    Admin Instructions:   1. For IV Admin: Give while recumbent maintain position  for 15-30 min. Give slow IV push over 5 min not to exceed 3mL/min 2. For IM Admin: a max of 5mL can be administered into each gluteal region.          Discontinued Medications  Medications 08/21/21 08/22/21 08/23/21       ceFAZolin in dextrose (ANCEF) IVPB solution 2 g  Dose: 2 g  Freq: Once Route: IV  Indications of Use: PERIOPERATIVE PHARMACOPROPHYLAXIS  Start: 08/20/21 0725   End: 08/20/21 0810    Admin Instructions:   Administer within 1 hour of surgical incision. Redose 4 hours from pre-op dose if procedure ongoing or >1.5 L blood loss.  Caution: Look alike/sound alike drug alert           methocarbamol (ROBAXIN) tablet 750 mg  Dose: 750 mg  Freq: Every 8 Hours Scheduled Route: PO  Start: 08/21/21 1400   End: 08/22/21 1231    1455     2113         0513              sodium chloride 0.9 % flush 3 mL  Dose: 3 mL  Freq: Every 12 Hours Scheduled Route: IV  Start: 08/20/21 0900   End: 08/20/21 1304               ,   Medication Administration Report for Oksana Pillai as of 08/23/21 1131    Legend:    Given Hold Not Given Due Canceled Entry Other Actions    Time Time (Time) Time  Time-Action       Discontinued     Completed     Future     MAR Hold     Linked           Medications 08/21/21 08/22/21 08/23/21   Discontinued Medications    HYDROmorphone (DILAUDID) PCA 0.2 mg/ml 50 mL syringe  Nurse Loading Dose: 0.2 mg  Patient Bolus Dose: 0.1 mg  Lockout Interval: 8 Minutes  Basal Rate: 0 mg/hr  One Hour Dose Limit: 0.75 mg  Freq: Continuous Route: IV  Start: 08/20/21 1245   End: 08/22/21 0600    Admin Instructions:   If given for pain, use the following pain scale:  Mild Pain = Pain Score of 1-3, CPOT 1-2  Moderate Pain = Pain Score of 4-6, CPOT 3-4  Severe Pain = Pain Score of 7-10, CPOT 5-8     0706     1919         0205     0702     0856            lactated ringers infusion  Rate: 50 mL/hr Dose: 50 mL/hr  Freq: Continuous Route: IV  Start: 08/20/21 1700   End: 08/22/21 1231    0822     1043              lactated ringers  infusion  Rate: 9 mL/hr Dose: 9 mL/hr  Freq: Continuous Route: IV  Start: 08/20/21 0725   End: 08/20/21 1603                and   Medication Administration Report for Oksana Pillai as of 08/23/21 1131    Legend:    Given Hold Not Given Due Canceled Entry Other Actions    Time Time (Time) Time  Time-Action       Discontinued     Completed     Future     MAR Hold     Linked           Medications 08/21/21 08/22/21 08/23/21    acetaminophen (TYLENOL) tablet 650 mg  Dose: 650 mg  Freq: Every 4 Hours PRN Route: PO  PRN Reason: Mild Pain   Start: 08/20/21 1603    Admin Instructions:   Do not exceed 4 grams of acetaminophen in a 24 hr period. Max dose of 2gm for AST/ALT greater than 120 units/L      If given for pain, use the following pain scale:   Mild Pain = Pain Score of 1-3, CPOT 1-2  Moderate Pain = Pain Score of 4-6, CPOT 3-4  Severe Pain = Pain Score of 7-10, CPOT 5-8           bisacodyl (DULCOLAX) suppository 10 mg  Dose: 10 mg  Freq: Daily PRN Route: RE  PRN Reason: Constipation  Start: 08/21/21 0920      0546             diazePAM (VALIUM) tablet 5 mg  Dose: 5 mg  Freq: Every 8 Hours PRN Route: PO  PRN Reason: Muscle Spasms  PRN Comment: for continued spasms 4 hrs after Robaxin.  Start: 08/21/21 1030    Admin Instructions:    Caution: Look alike/sound alike drug alert. Avoid use with [place]'s Wort. Avoid grapefruit juice.           HYDROcodone-acetaminophen (NORCO) 7.5-325 MG per tablet 1 tablet  Dose: 1 tablet  Freq: Every 4 Hours PRN Route: PO  PRN Reason: Moderate Pain   Start: 08/20/21 1545   End: 08/27/21 1544    Admin Instructions:   [ABIMAEL]    Do not exceed 4 grams of acetaminophen in a 24 hr period. Max dose of 2gm for AST/ALT greater than 120 units/L        If given for pain, use the following pain scale:   Mild Pain = Pain Score of 1-3, CPOT 1-2  Moderate Pain = Pain Score of 4-6, CPOT 3-4  Severe Pain = Pain Score of 7-10, CPOT 5-8     0438     0822     1455       1847          0113     0508 8076        1305     1656     2118        0259     0655            naloxone (NARCAN) injection 0.1 mg  Dose: 0.1 mg  Freq: Every 5 Minutes PRN Route: IV  PRN Reasons: Opioid Reversal,Respiratory Depression  PRN Comment: see administration instructions  Start: 08/20/21 1353    Admin Instructions:   For respiratory rate less than 8 per minute and if the patient is difficult arouse:  D/C PCA.  Give naloxone (NARCAN) 0.1 mg slow IV push.  May repeat x 1 if needed in 5 minutes. Notify physician.    Mix naloxone 0.4 mg/1 mL ampule in 9 mL normal saline.           ondansetron (ZOFRAN) tablet 4 mg  Dose: 4 mg  Freq: Every 6 Hours PRN Route: PO  PRN Reasons: Nausea,Vomiting  Start: 08/20/21 1603    Admin Instructions:   If BOTH ondansetron (ZOFRAN) and promethazine (PHENERGAN) are ordered use ondansetron first and THEN promethazine IF ondansetron is ineffective.          Or  ondansetron (ZOFRAN) injection 4 mg  Dose: 4 mg  Freq: Every 6 Hours PRN Route: IV  PRN Reasons: Nausea,Vomiting  Start: 08/20/21 1603    Admin Instructions:   If BOTH ondansetron (ZOFRAN) and promethazine (PHENERGAN) are ordered use ondansetron first and THEN promethazine IF ondansetron is ineffective.           Rimegepant Sulfate (NURTEC) tablet tablet dispersible 75 mg  Dose: 75 mg  Freq: Daily PRN Route: PO  PRN Comment: migraine  Start: 08/20/21 1353    Order specific questions:   Drug Name: Nurtec            simethicone (MYLICON) chewable tablet 80 mg  Dose: 80 mg  Freq: 4 Times Daily PRN Route: PO  PRN Reason: Flatulence  Start: 08/22/21 1713     1835              sodium chloride 0.9 % flush 10 mL  Dose: 10 mL  Freq: As Needed Route: IV  PRN Reason: Line Care  Start: 08/20/21 1603         Discontinued Medications  Medications 08/21/21 08/22/21 08/23/21       bupivacaine-EPINEPHrine PF (MARCAINE w/EPI) 0.25% [id number] injection  Freq: As Needed  Start: 08/20/21 1153   End: 08/20/21 1300          cyclobenzaprine (FLEXERIL) tablet 10 mg  Dose: 10 mg  Freq: 3  Times Daily PRN Route: PO  PRN Reason: Muscle Spasms  Start: 08/20/21 1332   End: 08/21/21 1022          diazePAM (VALIUM) tablet 5 mg  Dose: 5 mg  Freq: Every 6 Hours PRN Route: PO  PRN Reason: Muscle Spasms  Start: 08/20/21 1603   End: 08/21/21 1023    Admin Instructions:    Caution: Look alike/sound alike drug alert. Avoid use with [place]'s Wort. Avoid grapefruit juice.           diphenhydrAMINE (BENADRYL) capsule 25 mg  Dose: 25 mg  Freq: Every 30 Minutes PRN Route: PO  PRN Reason: Itching  PRN Comment: May repeat x 1  Indications of Use: EXTRAPYRAMIDAL REACTION,PRURITUS  Start: 08/20/21 1243   End: 08/20/21 1552    Admin Instructions:   Caution: Look alike/sound alike drug alert. This med may be ordered in other forms and routes. Before giving verify the last time the drug was given by any route/form.             diphenhydrAMINE (BENADRYL) injection 12.5 mg  Dose: 12.5 mg  Freq: Every 15 Minutes PRN Route: IV  PRN Reason: Itching  PRN Comment: May repeat x 1  Start: 08/20/21 1243   End: 08/20/21 1552    Admin Instructions:   Caution: Look alike/sound alike drug alert. This med may be ordered in other forms and routes. Before giving verify the last time the drug was given by any route/form.             docusate sodium (COLACE) capsule 100 mg  Dose: 100 mg  Freq: 2 Times Daily PRN Route: PO  PRN Reason: Constipation  Start: 08/20/21 1603   End: 08/21/21 0926    Admin Instructions:   Swallow whole. Do not open, crush, or chew capsule.     0822               ePHEDrine injection 5 mg  Dose: 5 mg  Freq: Once As Needed Route: IV  PRN Comment: symptomatic hypotension - Notify attending anesthesiologist if this needs to be given  Start: 08/20/21 1243   End: 08/20/21 1552    Admin Instructions:   Caution: Look alike/sound alike drug alert   Dilute with NS to 5-10 mg/mL.  Central line preferred, if unavailable use large bore IV access with frequent nurse monitoring of IV site.           fentaNYL citrate (PF) (SUBLIMAZE)  injection 50 mcg  Dose: 50 mcg  Freq: Every 5 Minutes PRN Route: IV  PRN Reasons: Moderate Pain ,Severe Pain   Start: 08/20/21 1243   End: 08/20/21 1552    Admin Instructions:   May alternate fentanyl with hydromorphone using fentanyl first.    Maximum total dose of fentanyl is 200 mcg.  If given for pain, use the following pain scale:  Mild Pain = Pain Score of 1-3, CPOT 1-2  Moderate Pain = Pain Score of 4-6, CPOT 3-4  Severe Pain = Pain Score of 7-10, CPOT 5-8           fentaNYL citrate (PF) (SUBLIMAZE) injection 50 mcg  Dose: 50 mcg  Freq: Every 10 Minutes PRN Route: IV  PRN Reason: Severe Pain   Start: 08/20/21 0723   End: 08/20/21 1304    Admin Instructions:   Maximum total dose of fentanyl is 100 mcg.  If given for pain, use the following pain scale:  Mild Pain = Pain Score of 1-3, CPOT 1-2  Moderate Pain = Pain Score of 4-6, CPOT 3-4  Severe Pain = Pain Score of 7-10, CPOT 5-8           flumazenil (ROMAZICON) injection 0.2 mg  Dose: 0.2 mg  Freq: As Needed Route: IV  PRN Comment: for benzodiazepine induced unresponsiveness or sedation  Indications of Use: BENZODIAZEPINE-INDUCED SEDATION  Start: 08/20/21 1243   End: 08/20/21 1552    Admin Instructions:   Notify Anesthesia if given  ** give IV over 15-30 seconds **           gelatin absorbable (GELFOAM) sponge  Freq: As Needed  Start: 08/20/21 0943   End: 08/20/21 1300          HYDROmorphone (DILAUDID) injection 0.5 mg  Dose: 0.5 mg  Freq: Every 5 Minutes PRN Route: IV  PRN Reasons: Moderate Pain ,Severe Pain   Start: 08/20/21 1243   End: 08/20/21 1552    Admin Instructions:   May alternate fentanyl with hydromorphone using fentanyl first.    Maximum total dose of hydromorphone is 2 mg.  If given for pain, use the following pain scale:  Mild Pain = Pain Score of 1-3, CPOT 1-2  Moderate Pain = Pain Score of 4-6, CPOT 3-4  Severe Pain = Pain Score of 7-10, CPOT 5-8           labetalol (NORMODYNE,TRANDATE) injection 5 mg  Dose: 5 mg  Freq: Every 5 Minutes PRN  Route: IV  PRN Reason: High Blood Pressure  PRN Comment: for systolic blood pressure greater than 180 mmHg or diastolic blood pressure greater than 105 mmHg  Start: 08/20/21 1243   End: 08/20/21 1552    Admin Instructions:   Hold for heart rate less than 60.  Give by slow IV Push each 20mg (or less) over 2 minutes           lidocaine PF 1% (XYLOCAINE) injection 0.5 mL  Dose: 0.5 mL  Freq: Once As Needed Route: IJ  PRN Comment: IV Start  Start: 08/20/21 0723   End: 08/20/21 1304          Mastisol liquid adhesive  Freq: As Needed  Start: 08/20/21 1210   End: 08/20/21 1300          midazolam (VERSED) injection 1 mg  Dose: 1 mg  Freq: Every 5 Minutes PRN Route: IV  PRN Reason: Anxiety  Start: 08/20/21 1243   End: 08/20/21 1552    Admin Instructions:   Maximum total dose of midazolam is 4 mg.             midazolam (VERSED) injection 1 mg  Dose: 1 mg  Freq: Every 10 Minutes PRN Route: IV  PRN Comment: Anxiety prophylaxis, Pre-op comfort  Start: 08/20/21 0723   End: 08/20/21 1304    Admin Instructions:   May repeat dose in 10 minutes one time then contact provider for additional orders.             naloxone (NARCAN) injection 0.2 mg  Dose: 0.2 mg  Freq: As Needed Route: IV  PRN Reasons: Opioid Reversal,Respiratory Depression  PRN Comment: unresponsiveness, decrease oxygen saturation  Indications of Use: ACUTE RESPIRATORY FAILURE,OPIOID-INDUCED RESPIRATORY DEPRESSION  Start: 08/20/21 1243   End: 08/20/21 1552    Admin Instructions:   Notify Anesthesia if given           ondansetron (ZOFRAN) injection 4 mg  Dose: 4 mg  Freq: Once As Needed Route: IV  PRN Reasons: Nausea,Vomiting  Indications of Use: POSTOPERATIVE NAUSEA AND VOMITING  Start: 08/20/21 1243   End: 08/20/21 1552    Admin Instructions:   If BOTH ondansetron (ZOFRAN) and promethazine (PHENERGAN) are ordered use ondansetron first and THEN promethazine IF ondansetron is ineffective.           promethazine (PHENERGAN) suppository 25 mg  Dose: 25 mg  Freq: Once As  Needed Route: RE  PRN Reasons: Nausea,Vomiting  Start: 08/20/21 1243   End: 08/20/21 1552    Admin Instructions:   If BOTH ondansetron (ZOFRAN) and promethazine (PHENERGAN) are ordered use ondansetron first and THEN promethazine IF ondansetron is ineffective.          Or  promethazine (PHENERGAN) tablet 25 mg  Dose: 25 mg  Freq: Once As Needed Route: PO  PRN Reasons: Nausea,Vomiting  Start: 08/20/21 1243   End: 08/20/21 1552    Admin Instructions:   If BOTH ondansetron (ZOFRAN) and promethazine (PHENERGAN) are ordered use ondansetron first and THEN promethazine IF ondansetron is ineffective.            sennosides-docusate (PERICOLACE) 8.6-50 MG per tablet 1 tablet  Dose: 1 tablet  Freq: Nightly PRN Route: PO  PRN Reason: Constipation  Start: 08/20/21 1603   End: 08/21/21 0926          sodium chloride 0.9 % flush 3-10 mL  Dose: 3-10 mL  Freq: As Needed Route: IV  PRN Reason: Line Care  Start: 08/20/21 0723   End: 08/20/21 1304          sodium chloride 0.9 % solution  Freq: As Needed  Start: 08/20/21 0943   End: 08/20/21 1300          sodium chloride 1,000 mL with ceFAZolin 2 g irrigation  Freq: As Needed  Start: 08/20/21 0943   End: 08/20/21 1300          sodium chloride 1,000 mL with heparin (porcine) 30,000 Units mixture  Freq: As Needed  Start: 08/20/21 1044   End: 08/20/21 1300          thrombin (THROMBIN-JMI) spray kit  Freq: As Needed  Start: 08/20/21 0944   End: 08/20/21 1300                      Physician Progress Notes      Sakina Jackman, APRN at 08/22/21 1209          NEUROSURGERY POST OP PROGRESS NOTE     LOS: 2 days   Patient Care Team:  Olinda Winters MD as PCP - General (Family Medicine)  Brian Ojeda MD as Surgeon (Neurosurgery)  Marissa Avendano MD as Consulting Physician (Gastroenterology)  Kristyn Fontaine MD as Consulting Physician (Obstetrics and Gynecology)  Iggy Bartlett MD as Consulting Physician (Pain Medicine)    Chief Complaint:  Back pain    Subjective     Interval History:  Lots of back spasm despite Robaxin. Walked with PT. Was able to go to bath room and wash at sink. Wearing brace when walking or in chair. Felt worn out after bath. Has concerns about going home and being able to manage children. Adams and PCA removed this am; states she has voided 3 times since w/o difficulty.      History taken from: patient chart    Objective      Vital Signs  Temp:  [97 °F (36.1 °C)-98.5 °F (36.9 °C)] 98.5 °F (36.9 °C)  Heart Rate:  [64-72] 68  Resp:  [16] 16  BP: ()/(57-70) 105/67     Physical Exam:     AA&O x 3.   Lumbar dressing dry and intact.   DONNA drain intact - 175 cc reddish serosanguinous drainage  No calf swelling, tenderness to bilateral palpation.   Wearing SCD's.       Results Review:     I reviewed the patient's new clinical results.    .  Results from last 7 days   Lab Units 08/22/21  0528 08/21/21 0444 08/18/21  0809   WBC 10*3/mm3 7.55 14.89* 6.27   HEMOGLOBIN g/dL 11.8* 12.9 13.2   HEMATOCRIT % 36.7 38.9 39.1   PLATELETS 10*3/mm3 153 191 198     .  Results from last 7 days   Lab Units 08/22/21  0528 08/21/21 0444 08/21/21  0444 08/18/21  0809 08/18/21  0809   SODIUM mmol/L 137  --  137  --  140   POTASSIUM mmol/L 4.1  --  4.0  --  4.0   CHLORIDE mmol/L 103  --  106  --  106   CO2 mmol/L 23.4  --  24.5  --  25.4   BUN mg/dL 12  --  8  --  15   CREATININE mg/dL 0.79  --  0.65  --  0.72   GLUCOSE mg/dL 81   < > 94   < > 84   CALCIUM mg/dL 8.5*  --  8.8  --  8.8    < > = values in this interval not displayed.       Assessment/Plan       Calf muscle weakness    History of spinal surgery    Herniation of lumbar intervertebral disc with radiculopathy    Herniated lumbar intervertebral disc    POD 2 open L5-S1 decompression with bilateral discectomy, interbody cages, transcortical L5-S1 pedicle screw and pete fixation using Mazor robot for recurrent right L5-S1 disc herniation, lumbosacral radiculopathy and motor deficit  Postoperative back spasms  Postoperative leukocytosis -  resolved    Tolerating lumbosacral brace well; continue to mobilize  Increase Robaxin for spasm relief.  Keep drain.  We will OT to evaluate.  PT is following.  Patient is asking about subacute rehab. CCP to evaluate  Encouraged patient to keep moving as this will build her confidence in being able to discharge home in the next day or so.  CBC in a.m.    NEIL Harrington  08/22/21  12:10 EDT          Electronically signed by Sakina Jackman APRN at 08/22/21 1227     Sakina Jackman APRN at 08/21/21 0911          NEUROSURGERY POST OP PROGRESS NOTE     LOS: 1 day   Patient Care Team:  Olinda Winters MD as PCP - General (Family Medicine)  Brian Ojeda MD as Surgeon (Neurosurgery)  Marissa Avendano MD as Consulting Physician (Gastroenterology)  Kristyn Fontaine MD as Consulting Physician (Obstetrics and Gynecology)  Iggy Bartlett MD as Consulting Physician (Pain Medicine)    Chief Complaint: Back pain    Subjective       Interval History: Having expected back spasms.  Right leg numbness better.  Has not walked yet. Feels that the strength in her right foot is getting better.    History taken from: patient chart    Objective      Vital Signs  Temp:  [97.5 °F (36.4 °C)-98.5 °F (36.9 °C)] 97.6 °F (36.4 °C)  Heart Rate:  [] 60  Resp:  [16-20] 16  BP: ()/(60-88) 90/60     Physical Exam:     Lumbar dressing intact; no evidence of bleeding or hematoma  DONNA drain intact - Drain - 215 cc; 150 cc since 0600 - still bloody  SCDs in place  Right gastroc strength improved 4-/5; had been 2/5 preoperatively  Sensation equal intact bilateral LE  Adams intact - clear yellow urine     Results Review:     I reviewed the patient's new clinical results.     .  Results from last 7 days   Lab Units 08/21/21  0444 08/18/21  0809   WBC 10*3/mm3 14.89* 6.27   HEMOGLOBIN g/dL 12.9 13.2   HEMATOCRIT % 38.9 39.1   PLATELETS 10*3/mm3 191 198     .  Results from last 7 days   Lab Units 08/21/21  0444 08/18/21  0809  08/18/21  0809   SODIUM mmol/L 137  --  140   POTASSIUM mmol/L 4.0  --  4.0   CHLORIDE mmol/L 106  --  106   CO2 mmol/L 24.5  --  25.4   BUN mg/dL 8  --  15   CREATININE mg/dL 0.65  --  0.72   GLUCOSE mg/dL 94   < > 84   CALCIUM mg/dL 8.8  --  8.8    < > = values in this interval not displayed.       Assessment/Plan       Calf muscle weakness    History of spinal surgery    Herniation of lumbar intervertebral disc with radiculopathy    Herniated lumbar intervertebral disc    POD 1 open L5-S1 decompression, bilateral discectomy with placement of interbody cages, transcortical L5-S1 pedicle screw and pete fixation using [employer] robot for recurrent right L5-S1 herniated disc, radiculopathy and motor deficit  Postoperative lumbar spasms (expected)    Keep drain  CBC am  Order LSO brace; mobilize; PT ordered   Will start routine Robaxin.  Encouraged to use as needed Flexeril for breakthrough spasms.   Also encouraged to use oral pain medications and limit PCA pump for breakthrough pain.  DC PCA pump and Adams catheter tomorrow      An OTS LSO brace has been ordered due to diagnosis of post op lumbar fusion.  The purpose of the brace is to support weak muscles, stabilize and restrict movement of the trunk to aid in healing and pain relief of lumbar fusion.      NEIL Harrington  08/21/21  09:11 EDT          Electronically signed by Sakina Jackman APRN at 08/21/21 4649

## 2021-08-23 NOTE — CASE MANAGEMENT/SOCIAL WORK
Discharge Planning Assessment  [facility]     Patient Name: Oksana Pillai  MRN: [id number]  Today's Date: 8/23/2021    Admit Date: 8/20/2021    Discharge Needs Assessment     Row Name 08/23/21 0956       Living Environment    Lives With  spouse;child(lexie), dependent;other relative(s)    Current Living Arrangements  home/apartment/condo    Primary Care Provided by  self    Provides Primary Care For  no one    Family Caregiver if Needed  spouse;other relative(s)    Quality of Family Relationships  helpful;involved;supportive    Able to Return to Prior Arrangements  yes       Resource/Environmental Concerns    Transportation Concerns  car, none       Transition Planning    Patient/Family Anticipates Transition to  home with family;home with help/services    Patient/Family Anticipated Services at Transition      Transportation Anticipated  family or friend will provide       Discharge Needs Assessment    Readmission Within the Last 30 Days  no previous admission in last 30 days    Equipment Currently Used at Home  none    Discharge Facility/Level of Care Needs  home with home health    Provided Post Acute Provider List?  Yes    Post Acute Provider List  Home Health        Discharge Plan     Row Name 08/23/21 0957       Plan    Plan  Home with family support & [facility].    Patient/Family in Agreement with Plan  yes    Plan Comments  Spoke with the patient, verified current information and explained the role of the CCP. Patient said she has family support. She's IADL and has no history with DME/RH/HH. Physical Therapy eval/rec noted. Patient said she plans to d/c home with family support & HH PT. She requests [facility] . Referral sent in [facility]. Patient also said she plans for her family to transport her home by car at d/c. No other needs identified.        Continued Care and Services - Admitted Since 8/20/2021     Home Medical Care     Service Provider Request Status Selected Services Address Phone Fax Patient  Preferred     [facility]  Pending - Request Sent N/A [address] [place] [address] [place] [address] [phone] --                Demographic Summary     Row Name 08/23/21 0955       General Information    Admission Type  inpatient    Reason for Consult  discharge planning    Preferred Language  English     Used During This Interaction  no       Contact Information    Permission Granted to Share Info With  ;family/designee        Functional Status     Row Name 08/23/21 0955       Functional Status    Usual Activity Tolerance  good    Current Activity Tolerance  good       Functional Status, IADL    Medications  independent    Meal Preparation  independent    Housekeeping  independent    Laundry  independent    Shopping  independent       Mental Status Summary    Recent Changes in Mental Status/Cognitive Functioning  no changes        Psychosocial     Row Name 08/23/21 0956       Intellectual Performance WDL    Level of Consciousness  Alert       Coping/Stress    Patient Personal Strengths  able to adapt    Sources of Support  other family members;spouse    Reaction to Health Status  accepting    Understanding of Condition and Treatment  adequate understanding of medical condition       Developmental Stage (Eriksson's)    Developmental Stage  Stage 7 (35-65 years/Middle Adulthood) Generativity vs. Stagnation        Abuse/Neglect    No documentation.       Legal    No documentation.       Substance Abuse    No documentation.       Patient Forms    No documentation.           Renee Pickett, RN

## 2021-08-24 ENCOUNTER — HOME CARE VISIT (OUTPATIENT)
Dept: HOME HEALTH SERVICES | Facility: HOME HEALTHCARE | Age: 46
End: 2021-08-24

## 2021-08-24 VITALS
RESPIRATION RATE: 18 BRPM | HEART RATE: 97 BPM | TEMPERATURE: 97.5 F | DIASTOLIC BLOOD PRESSURE: 77 MMHG | SYSTOLIC BLOOD PRESSURE: 111 MMHG | OXYGEN SATURATION: 97 %

## 2021-08-24 PROCEDURE — G0151 HHCP-SERV OF PT,EA 15 MIN: HCPCS

## 2021-08-24 NOTE — CASE MANAGEMENT/SOCIAL WORK
Case Management Discharge Note      Final Note: Home with [facility]. Carol Campuzano CSW    Provided Post Acute Provider List?: Yes  Post Acute Provider List: Home Health    Selected Continued Care - Discharged on 8/23/2021 Admission date: 8/20/2021 - Discharge disposition: Home or Self Care    Destination    No services have been selected for the patient.              Durable Medical Equipment    No services have been selected for the patient.              Dialysis/Infusion    No services have been selected for the patient.              Home Medical Care Coordination complete.    Service Provider Selected Services Address Phone Fax Patient Preferred    [facility]  Home Health Services [address] [address] [phone] --          Therapy    No services have been selected for the patient.              Community Resources    No services have been selected for the patient.              Community & DME    No services have been selected for the patient.                       Final Discharge Disposition Code: 06 - home with home health care

## 2021-08-24 NOTE — PAYOR COMM NOTE
"DISCHARGED  REF #[id number]        Oksana Pillai (45 y.o. Female)     Date of Birth Social Security Number Address Home Phone MRN    1975  [address] [address] [phone] [id number]    [employer] Mar[employer] Status          Other        Admission Date Admission Type Admitting Provider Attending Provider Department, Room/Bed    8/20/21 Elective Brian Ojeda MD  [address], P880/1    Discharge Date Discharge Disposition Discharge Destination        8/23/2021 Home or Self Care              Attending Provider: (none)   Allergies: Venlafaxine    Isolation: None   Infection: None   Code Status: Prior    Ht: 170.2 cm (67\")   Wt: 95.5 kg (210 lb 8.6 oz)    Admission Cmt: None   Principal Problem: None                Active Insurance as of 8/20/2021     Primary Coverage     Payor Plan Insurance Group Employer/Plan Group    [employer] BLUE CROSS AN[employer]M BLUE CROSS BLUE SHIELD PPO [id number]     Payor Plan Address Payor Plan Phone Number Payor Plan Fax Number Effective Dates    [address] [phone]  1/1/2019 - None Entered    [facility] [id number]       Subscriber Name Subscriber Birth Date Member ID       ZACKERY PILLAI 10/3/1973 [id number]                 Emergency Contacts      (Rel.) Home Phone Work Phone Mobile Phone    Zackery Pillai (Spouse) [phone] -- --    Erica Han (Mother) -- -- [phone]    Ramsey Han (Tod) (Father) -- -- [phone]               Physician Progress Notes       Magalis Delgado APRN at 08/23/21 1214     Attestation signed by John Walter MD at 08/23/21 1404    I have reviewed this documentation and agree.                  [facility] NEUROSURGERY  PROGRESS NOTE    Spoke with patient at bedside about her concerns related to returning home postoperatively.  She has worked with physical therapy today and physical therapy reports that she is able to ambulate 350 feet with the use of a rolling walker.  They also " "worked ascending and descending steps in which she did well with.  She did require 2 rest breaks due to fatigue in pain/soreness which is expected.  I encouraged her to continue ambulating as this will continue to strengthen her muscles and will also help with muscle soreness.  Also encouraged her to continue taking her pain and muscle spasm medication as this will help with these things as well.  She reports requesting a hospital bed for her house as she \"has a 6-year-old who does not know boundaries\".  Informed her that I would speak with the  to see if this is a possibility.      DONNA drain removed at this time as well without difficulty.     NEIL Guerra  [facility] Neurosurgery    Electronically signed by John Walter MD at 08/23/21 1404            Discharge Summary      Magalis Delgado APRN at 08/23/21 1301          Oksana Pillai  1975    Patient Care Team:  Olinda Winters MD as PCP - General (Family Medicine)  Brian Ojeda MD as Surgeon (Neurosurgery)  Marissa Avendano MD as Consulting Physician (Gastroenterology)  Kristyn Fontaine MD as Consulting Physician (Obstetrics and Gynecology)  Iggy Bartlett MD as Consulting Physician (Pain Medicine)    Date of Admit: 8/20/2021    Date of Discharge:  8/23/2021    Discharge Diagnosis:  Calf muscle weakness    History of spinal surgery    Herniation of lumbar intervertebral disc with radiculopathy    Herniated lumbar intervertebral disc      Procedures Performed  Procedure(s):  Open lumbar decompression/discectomy lumbar five/sacral one with posterior lumbar interbody fusion with cages and transcortical screw/pete fixation using the Mazor robot       Complications: NONE    Consultants:   Consults     No orders found from 7/22/2021 to 8/21/2021.          Condition on Discharge: stable    Discharge disposition: home    Pertinent Test Results: NONE    Brief HPI: Patient evaluated in office for complaints of pain and severe " gastrocnemius weakness.  She recently underwent a right L5-S1 discectomy a few years ago.  She presented at this time with motor deficit and significant pain.  She eventually got better and the motor deficit resolved but she had recurrence of pain and severe gastrocnemius weakness due to a recurrent herniated disc with disc space collapse. MRI imaging revealed enhancing tissue in the epidural space anterolateral and lateral to the thecal sac at L5-S1 abutting the right S1 nerve root. RBAs of treatment were discussed including the above procedure. Patient consented to above procedure.    Hospital Course: Patient admitted for above procedure. The procedure itself was without complication. The patient was discharged home following recovery.     Discharge Physical Exam:    Temp:  [97.2 °F (36.2 °C)-98.4 °F (36.9 °C)] 97.2 °F (36.2 °C)  Heart Rate:  [61-75] 64  Resp:  [16] 16  BP: ()/(61-73) 98/63    Current labs:  Lab Results (last 24 hours)     Procedure Component Value Units Date/Time    CBC (No Diff) [[id number]]  (Abnormal) Collected: 08/23/21 0557    Specimen: Blood Updated: 08/23/21 0641     WBC 7.19 10*3/mm3      RBC 3.77 10*6/mm3      Hemoglobin 12.2 g/dL      Hematocrit 37.0 %      MCV 98.1 fL      MCH 32.4 pg      MCHC 33.0 g/dL      RDW 12.0 %      RDW-SD 43.9 fl      MPV 11.6 fL      Platelets 183 10*3/mm3             General Appearance No acute distress.   HEENT NC/AT   Neurological Awake, Alert, and oriented x 3   Cranial nerves CN II-XII intact   Motor  5/5 strength throughout.   Sensory Intact to light touch    Gait and station Ambulating with minimal assist   Neck Supple, trachea midline, no carotid bruit   Back  lumbar incision well approximated with Steri-Strips in place.  No evidence of redness, swelling, drainage.  DONNA drain removed-removal site with no evidence of drainage.  New dressing in place.   Extremities Moves all extremities well, no edema, no cyanosis, no redness         Discharge  Medications  James has been reviewed and narcotic consent is on file in the patient's chart.     Your medication list      START taking these medications      Instructions Last Dose Given Next Dose Due   HYDROcodone-acetaminophen 7.5-325 MG per tablet  Commonly known as: NORCO  Replaces: HYDROcodone-acetaminophen 5-325 MG per tablet      Take 1 tablet by mouth Every 4 (Four) Hours As Needed for Moderate Pain  for up to 4 days.       methocarbamol 500 MG tablet  Commonly known as: ROBAXIN      Take 2 tablets by mouth Every 6 (Six) Hours.          CONTINUE taking these medications      Instructions Last Dose Given Next Dose Due   busPIRone 10 MG tablet  Commonly known as: BUSPAR      Take 10 mg by mouth. PT STATES HASN'T STARTED YET       CALCIUM 1200 PO      Take 1 tablet by mouth Daily.       fexofenadine 60 MG tablet  Commonly known as: ALLEGRA      Take 60 mg by mouth Daily.       fish oil 1000 MG capsule capsule      Take 1,000 mg by mouth Daily With Breakfast. PT HOLDING FOR SURGERY       levonorgestrel 20 MCG/24HR IUD  Commonly known as: MIRENA      1 each by Intrauterine route 1 (One) Time.       LORazepam 2 MG tablet  Commonly known as: ATIVAN      Take 2 mg by mouth As Needed.       MAGNESIUM PO      Take 1 tablet by mouth Daily.       multivitamin tablet tablet      Take 1 tablet by mouth Daily.       Nurtec 75 MG tablet dispersible tablet  Generic drug: Rimegepant Sulfate      Take 75 mg by mouth Daily As Needed.       POTASSIUM GLUCONATE PO      Take 1 tablet by mouth Daily.       sertraline 100 MG tablet  Commonly known as: ZOLOFT      Take 175 mg by mouth Daily.       VITAMIN D3 PO      Take 2 tablets by mouth Daily.       vitamin E 400 UNIT capsule      Take 400 Units by mouth Daily. PT HOLDING FOR SURGERY       Xhance 93 MCG/ACT Exhaler Suspension  Generic drug: Fluticasone Propionate      2 sprays into the nostril(s) as directed by provider 2 (two) times a day.          STOP taking these medications  "   Chlorhexidine Gluconate Cloth 2 % pads        cyclobenzaprine 10 MG tablet  Commonly known as: FLEXERIL        HYDROcodone-acetaminophen 5-325 MG per tablet  Commonly known as: NORCO  Replaced by: HYDROcodone-acetaminophen 7.5-325 MG per tablet              Where to Get Your Medications      These medications were sent to [facility] [place], [place] - [address] [facility] [address] - [phone]  - [phone]   [address] [facility] [address], [place] [address]    Phone: [phone]   · HYDROcodone-acetaminophen 7.5-325 MG per tablet  · methocarbamol 500 MG tablet         Discharge Diet:     Diet Order   Procedures   • Diet Regular       Activity at Discharge:       Call for: questions or concerns    Follow-up Appointments  No future appointments.  Contact information for after-discharge care     Home Medical Care     [facility] .    Service: Home Health Services  Contact information:  [address]  [facility] [address] [phone]                         Test Results Pending at Discharge     None    I discussed the discharge instructions with patient, nursing staff and Dr. Kerrie Levine APRN  08/23/21  13:01 EDT    40 min spent in reviewing records, discussion and examination of the patient and discussion with other members of the patient's medical team.    \"Dictated utilizing Dragon dictation\".      Electronically signed by Magalis Delgado APRN at 08/23/21 1519       Discharge Order (From admission, onward)     Start     Ordered    08/23/21 1304  Discharge patient  Once     Expected Discharge Date: 08/23/21    Expected Discharge Time: Afternoon    Discharge Disposition: Home or Self Care    Physician of Record for Attribution - Please select from Treatment Team: ROXANN VARGAS [[id number]]    Review needed by CMO to determine Physician of Record: No       Question Answer Comment   Physician of Record for Attribution - Please select from Treatment " Team ROXANN VARGAS    Review needed by CMO to determine Physician of Record No        08/23/21 7860

## 2021-08-24 NOTE — HOME HEALTH
Reason for referral: 46 yo F referred to home health physical therapy after recent hospitalization after recent lumbar decompression/discectomy L5/S1 with lumbar fusion and screw/pete fixation due to herniated disc    Subjective: I am still waiting on the delivery of the hospital bed from [facility]    Past Medical History: herniated lumbar disc, anxiety, depression, calf muscle weakness, chronic pain syndrome, lumbar spondylosis, degeneration of intervertebral disk at C5-6 level    Previous level of function: IND with amb without device, IND with ADL's, driving    Home environment/support: lives with spouse and mother in law who provide assist as needed, currently staying on main floor  in office. Awaiting delivery of hospital bed.

## 2021-08-24 NOTE — CASE COMMUNICATION
PT SOC 8/24/2021. Anticipate 2w2, 1w1 for evaluation, gait training, transfer training, home safety and back precautions instruction.

## 2021-08-25 ENCOUNTER — TELEPHONE (OUTPATIENT)
Dept: NEUROSURGERY | Facility: CLINIC | Age: 46
End: 2021-08-25

## 2021-08-25 NOTE — TELEPHONE ENCOUNTER
Patient called and states she is needing a po appointment explained will put her on the list and call when appointment opens

## 2021-08-25 NOTE — TELEPHONE ENCOUNTER
Oksana Pillai called and is having intermittent jolts of pain in her thighs. She had Open lumbar decompression/discectomy lumbar five/sacral one with posterior lumbar interbody fusion with cages and transcortical screw/pete fixation using the Screenhero robot on 8/20/21.  She is also wanting to know if she is supposed to keep her dressing uncovered or covered. [phone].

## 2021-08-25 NOTE — TELEPHONE ENCOUNTER
Doing well, normal tingling and spasms.  Robaxin and narcotics are helping.  Will send pictures through the patient portal, as she is a little concerned about the look of her incision.

## 2021-08-27 ENCOUNTER — TELEPHONE (OUTPATIENT)
Dept: NEUROSURGERY | Facility: CLINIC | Age: 46
End: 2021-08-27

## 2021-08-27 ENCOUNTER — HOME CARE VISIT (OUTPATIENT)
Dept: HOME HEALTH SERVICES | Facility: HOME HEALTHCARE | Age: 46
End: 2021-08-27

## 2021-08-27 VITALS
TEMPERATURE: 97.8 F | OXYGEN SATURATION: 97 % | DIASTOLIC BLOOD PRESSURE: 80 MMHG | SYSTOLIC BLOOD PRESSURE: 124 MMHG | RESPIRATION RATE: 18 BRPM | HEART RATE: 80 BPM

## 2021-08-27 PROCEDURE — G0151 HHCP-SERV OF PT,EA 15 MIN: HCPCS

## 2021-08-27 NOTE — TELEPHONE ENCOUNTER
----- Message from NEIL Huerta sent at 8/26/2021  4:35 PM EDT -----  Regarding: FW: Visit Follow-Up Question  Contact: [phone]  Please call her and tell her that her pictures look fine and that she needs to be up walking regularly.  There is no redness that I can see, or any cause for alarm.  Thank you!    ----- Message -----  From: Samaria Juarez MA  Sent: 8/26/2021   3:45 PM EDT  To: NEIL Huerta  Subject: FW: Visit Follow-Up Question                       ----- Message -----  From: Oksana Pillai  Sent: 8/26/2021   9:20 AM EDT  To: [facility] Neuro[facility]  Subject: Visit Follow-Up Question                         Last picture to add taken last night. My  wanted to show the amount of swelling - see around his fingers.

## 2021-08-27 NOTE — TELEPHONE ENCOUNTER
Called and left message for patient that Lanny Gutiérrez looked at the pictures of her incision and it looks good, no redness and no cause for alarm.  She needs to be up and walking regularly.  I left the office phone number.

## 2021-08-28 NOTE — HOME HEALTH
Subjective: I have been doing a lot of walking but not a lot of sitting. per patient    Assessment: patient requiring less assist for ambulation, reports good pain control    Plan for next visit: stairs as able, gait training with cane as able.

## 2021-08-31 ENCOUNTER — TELEPHONE (OUTPATIENT)
Dept: NEUROSURGERY | Facility: CLINIC | Age: 46
End: 2021-08-31

## 2021-08-31 ENCOUNTER — HOME CARE VISIT (OUTPATIENT)
Dept: HOME HEALTH SERVICES | Facility: HOME HEALTHCARE | Age: 46
End: 2021-08-31

## 2021-08-31 VITALS
DIASTOLIC BLOOD PRESSURE: 75 MMHG | TEMPERATURE: 97.4 F | RESPIRATION RATE: 18 BRPM | HEART RATE: 68 BPM | OXYGEN SATURATION: 95 % | SYSTOLIC BLOOD PRESSURE: 103 MMHG

## 2021-08-31 PROCEDURE — G0151 HHCP-SERV OF PT,EA 15 MIN: HCPCS

## 2021-08-31 NOTE — HOME HEALTH
Subjective: I think I have been overdoing it. per patient    Assessment: patient verbalizes good understanding to take rest breaks throughout the day and agrees to try to rest more throughout the day    Plan for next visit: gait training with cane, stairs as able

## 2021-09-01 ENCOUNTER — OFFICE VISIT (OUTPATIENT)
Dept: NEUROSURGERY | Facility: CLINIC | Age: 46
End: 2021-09-01

## 2021-09-01 VITALS
DIASTOLIC BLOOD PRESSURE: 84 MMHG | TEMPERATURE: 97.3 F | SYSTOLIC BLOOD PRESSURE: 124 MMHG | RESPIRATION RATE: 16 BRPM | HEIGHT: 67 IN | HEART RATE: 64 BPM | WEIGHT: 211 LBS | BODY MASS INDEX: 33.12 KG/M2

## 2021-09-01 DIAGNOSIS — Z09 SURGICAL FOLLOWUP VISIT: Primary | ICD-10-CM

## 2021-09-01 DIAGNOSIS — M79.669 CALF PAIN, UNSPECIFIED LATERALITY: ICD-10-CM

## 2021-09-01 PROCEDURE — 99024 POSTOP FOLLOW-UP VISIT: CPT | Performed by: NURSE PRACTITIONER

## 2021-09-01 RX ORDER — HYDROCODONE BITARTRATE AND ACETAMINOPHEN 7.5; 325 MG/1; MG/1
1 TABLET ORAL EVERY 6 HOURS PRN
Qty: 40 TABLET | Refills: 0 | Status: SHIPPED | OUTPATIENT
Start: 2021-09-01

## 2021-09-01 RX ORDER — HYDROCODONE BITARTRATE AND ACETAMINOPHEN 7.5; 325 MG/1; MG/1
1 TABLET ORAL EVERY 4 HOURS PRN
COMMUNITY
End: 2021-09-01 | Stop reason: SDUPTHER

## 2021-09-02 ENCOUNTER — HOME CARE VISIT (OUTPATIENT)
Dept: HOME HEALTH SERVICES | Facility: HOME HEALTHCARE | Age: 46
End: 2021-09-02

## 2021-09-02 VITALS
DIASTOLIC BLOOD PRESSURE: 85 MMHG | HEART RATE: 64 BPM | SYSTOLIC BLOOD PRESSURE: 134 MMHG | OXYGEN SATURATION: 98 % | TEMPERATURE: 97.5 F

## 2021-09-02 PROCEDURE — G0151 HHCP-SERV OF PT,EA 15 MIN: HCPCS

## 2021-09-02 NOTE — HOME HEALTH
Patient was ssen for a total of 4 PT visits for eval, gait, ther ex, HEP, and home safety instruction. Currently patient is IND with amb with FWW/cane, IND with ADL's, IND with HEP, and IND with home safety precautions. Patient is discharged from home health PT. Patient agrees with discharge.

## 2021-09-07 NOTE — PROGRESS NOTES
"Subjective   Patient ID: Oksana Pillai is a 46 y.o. female is here today for follow-up after US venous doppler.       History of Present Illness  Patient reports numbness, tingling and weakness on left side. Patient reports pain in left buttock and into the thigh, inner and outer. Patient states sciatica is usually present in the right side.     Ms. Pillai is about 26 days post-op from an open L5-S1 decompression and bilateral discectomy with placement of interbody cages and transcortical L5-S1 pedicle screw and pete fixation using the [employer] robot on 8/20/2021 with Dr. Ojeda.  She was recently seen on 9/1 by NEIL Navas for her first postop visit following surgery.  At this visit, she had complained of bilateral calf pain and bilateral lower extremity Dopplers were ordered.  The results were discussed with her today and the patient was advised that these were negative for DVT.  Today, she reports continued improvement with strength and mobility in the right lower extremity.  Reports that she spent most of last week in bed and she was having difficulty with a plantar fasciitis flare up.  She states PT is coming to her house and she is receiving manual therapy to \"work out the kinks\".  Reports that she may have overdone it with household chores the week of Labor Day as she began to have some increased left buttock and hamstring pain that began on Friday or Saturday of last week.  Reports that she is still taking pain medication however she is cutting her 7.5 Norcos in half and continues to take the Robaxin.  She is currently ambulating with rolling walker and wearing her LSO brace.  She denies any new symptoms at this time.    The following portions of the patient's history were reviewed and updated as appropriate: past family history, past medical history and past social history.    Review of Systems   Constitutional: Positive for activity change ( Plantar fasciitis flareup). Negative for fever.   HENT: " "Negative.    Eyes: Negative.    Respiratory: Negative for chest tightness and shortness of breath.    Cardiovascular: Negative for chest pain.   Gastrointestinal: Negative.    Endocrine: Negative.    Genitourinary: Negative.    Musculoskeletal: Positive for back pain and gait problem ( using a walker).   Skin: Negative.    Allergic/Immunologic: Negative.    Neurological: Positive for weakness and numbness.   Hematological: Negative.    Psychiatric/Behavioral: Negative.          Objective     Vitals:    09/15/21 1514   BP: 120/74   Temp: 98.5 °F (36.9 °C)   Weight: 95.7 kg (211 lb)   Height: 170.2 cm (67\")     Body mass index is 33.05 kg/m².      Physical Exam  Vitals reviewed.   Constitutional:       Appearance: Normal appearance.   Pulmonary:      Effort: Pulmonary effort is normal.   Musculoskeletal:         General: Tenderness present.      Left upper leg: Tenderness present.      Comments:   Left buttock, upper posterior thigh tenderness to palpation  Able to stand on her tip-toes and squat without dificulty   Skin:     General: Skin is warm and dry.      Comments: Lumbar incision well approximated. No evidence of redness, swelling, or discharge.    Neurological:      Mental Status: She is alert and oriented to person, place, and time.      GCS: GCS eye subscore is 4. GCS verbal subscore is 5. GCS motor subscore is 6.      Gait: Gait is intact.   Psychiatric:         Attention and Perception: Attention normal.         Mood and Affect: Mood normal.         Speech: Speech normal.         Behavior: Behavior normal.         Thought Content: Thought content normal.         Cognition and Memory: Cognition normal.         Judgment: Judgment normal.       Neurologic Exam     Mental Status   Oriented to person, place, and time.   Attention: normal. Concentration: normal.   Speech: speech is normal   Level of consciousness: alert  Knowledge: good.   Normal comprehension.     Cranial Nerves   Cranial nerves II through XII " intact.     Motor Exam   Muscle bulk: normal  Overall muscle tone: normal  Strength equal bilaterally.     Sensory Exam   Light touch normal.     Gait, Coordination, and Reflexes     Gait  Gait: normal  Using a walker for gait assistance           Assessment/Plan   Independent Review of Radiographic Studies:      I personally reviewed the images from the following studies:    BLE Venous Dopplers: Negative for DVT bilaterally    Medical Decision Making:      Ms. Pillai is here today for follow-up from  open L5-S1 decompression and bilateral discectomy with placement of interbody cages and transcortical L5-S1 pedicle screw and pete fixation using the [employer] robot on 8/20/2021 with Dr. Ojeda and to discuss BLE doppler results. Her right leg motor and strength continue to improve. She has began to wean herself off the pain medication but continues to take the Robaxin for muscle relaxation. Encouraged her to continue with manual therapy and walking as this could help to reduce muscle tightness and help her to wean off the Robaxin. Advised her to keep using her LSO brace with any OOB activity and to continue with sitting restrictions. Advised her that she is still recovering as she is experiencing some expected post-operative complaints and reassured her that she had severe motor deficits prior to surgery and this will take time to heal. I re-ordered her Robaxin. She verbalized understanding and is in agreement with the above plan.       Diagnoses and all orders for this visit:    1. Surgical follow-up care (Primary)    Other orders  -     methocarbamol (ROBAXIN) 500 MG tablet; Take 2 tablets by mouth Every 6 (Six) Hours.  Dispense: 40 tablet; Refill: 1      No follow-ups on file.         Answers for HPI/ROS submitted by the patient on 9/15/2021  Please describe your symptoms.: “4 Week” Post-op follow up  Have you had these symptoms before?: Yes  How long have you been having these symptoms?: Greater than 2  weeks  Please list any medications you are currently taking for this condition.: All in chart  Please describe any probable cause for these symptoms. : Simply Post-Op Follow Up  What is the primary reason for your visit?: Other

## 2021-09-08 ENCOUNTER — TELEPHONE (OUTPATIENT)
Dept: NEUROSURGERY | Facility: CLINIC | Age: 46
End: 2021-09-08

## 2021-09-08 NOTE — TELEPHONE ENCOUNTER
Patient was last seen by Sakina on 9/1/21. Patient had an open L5-S1 decompression and bilateral discectomy with placement of interbody cages and transcortical L5-S1 pedicle screw and pete fixation using the [employer] robot on 08-20-21.

## 2021-09-08 NOTE — TELEPHONE ENCOUNTER
Pt called stating that her leg pain has subsided and wanted to know if she should cancel the ultra sound that's scheduled for tomorrow night? Please call and advise

## 2021-09-09 ENCOUNTER — HOSPITAL ENCOUNTER (OUTPATIENT)
Dept: ULTRASOUND IMAGING | Facility: HOSPITAL | Age: 46
Discharge: HOME | End: 2021-09-09
Admitting: NURSE PRACTITIONER

## 2021-09-09 DIAGNOSIS — Z09 SURGICAL FOLLOWUP VISIT: ICD-10-CM

## 2021-09-09 DIAGNOSIS — M79.669 CALF PAIN, UNSPECIFIED LATERALITY: ICD-10-CM

## 2021-09-09 PROCEDURE — 93970 EXTREMITY STUDY: CPT

## 2021-09-09 NOTE — TELEPHONE ENCOUNTER
I spoke with the patient and advised to proceed with the ultrasound appointment scheduled for today.

## 2021-09-10 ENCOUNTER — TELEPHONE (OUTPATIENT)
Dept: NEUROSURGERY | Facility: CLINIC | Age: 46
End: 2021-09-10

## 2021-09-10 RX ORDER — METHOCARBAMOL 500 MG/1
1000 TABLET, FILM COATED ORAL EVERY 6 HOURS SCHEDULED
Qty: 40 TABLET | Refills: 1 | Status: SHIPPED | OUTPATIENT
Start: 2021-09-10 | End: 2021-09-15 | Stop reason: SDUPTHER

## 2021-09-10 NOTE — TELEPHONE ENCOUNTER
"Pt is requesting a refill for Robaxin she is out. She completed the prescription she was discharged with and the refill. It was prescribed 2 every 6 hours.     Next Follow up appointment scheduled 9/15/21.     She complains of pain at constant 6-7.    I was unable to get anymore information from her because she was having a \"family issue\" and said she will callback.     Pharmacy in [facility] is correct.   "

## 2021-09-10 NOTE — TELEPHONE ENCOUNTER
She said she has been taking Hydrocodone 5/325 instead of Hydrocodone 7.5 mg along with two Robaxin because it was causing extreme drowsiness.     She is complaining of buttock pain and bilateral hip.

## 2021-09-13 NOTE — PROGRESS NOTES
Just TOMAS. I ordered this doppler at last office visit for calf pain. It was negative. I think she is seeing you this week.

## 2021-09-14 ENCOUNTER — TELEPHONE (OUTPATIENT)
Dept: NEUROSURGERY | Facility: CLINIC | Age: 46
End: 2021-09-14

## 2021-09-15 ENCOUNTER — APPOINTMENT (OUTPATIENT)
Dept: WOMENS IMAGING | Facility: HOSPITAL | Age: 46
End: 2021-09-15

## 2021-09-15 ENCOUNTER — OFFICE VISIT (OUTPATIENT)
Dept: NEUROSURGERY | Facility: CLINIC | Age: 46
End: 2021-09-15

## 2021-09-15 ENCOUNTER — TELEPHONE (OUTPATIENT)
Dept: NEUROSURGERY | Facility: CLINIC | Age: 46
End: 2021-09-15

## 2021-09-15 VITALS
DIASTOLIC BLOOD PRESSURE: 74 MMHG | HEIGHT: 67 IN | WEIGHT: 211 LBS | SYSTOLIC BLOOD PRESSURE: 120 MMHG | TEMPERATURE: 98.5 F | BODY MASS INDEX: 33.12 KG/M2

## 2021-09-15 DIAGNOSIS — Z09 SURGICAL FOLLOW-UP CARE: Primary | ICD-10-CM

## 2021-09-15 PROCEDURE — 77063 BREAST TOMOSYNTHESIS BI: CPT | Performed by: RADIOLOGY

## 2021-09-15 PROCEDURE — 99024 POSTOP FOLLOW-UP VISIT: CPT | Performed by: NURSE PRACTITIONER

## 2021-09-15 PROCEDURE — 77067 SCR MAMMO BI INCL CAD: CPT | Performed by: RADIOLOGY

## 2021-09-15 RX ORDER — SERTRALINE HYDROCHLORIDE 25 MG/1
25 TABLET, FILM COATED ORAL DAILY
COMMUNITY

## 2021-09-15 RX ORDER — SERTRALINE HYDROCHLORIDE 100 MG/1
200 TABLET, FILM COATED ORAL DAILY
COMMUNITY

## 2021-09-15 RX ORDER — METHOCARBAMOL 500 MG/1
1000 TABLET, FILM COATED ORAL EVERY 6 HOURS SCHEDULED
Qty: 40 TABLET | Refills: 1 | Status: SHIPPED | OUTPATIENT
Start: 2021-09-15

## 2021-09-15 NOTE — TELEPHONE ENCOUNTER
Pt wanted to know if she could have her annual pap smear today after back surgery in August 2021. Per LT:  Yes she can.  Pt was notified.

## 2021-09-22 ENCOUNTER — TELEPHONE (OUTPATIENT)
Dept: NEUROSURGERY | Facility: CLINIC | Age: 46
End: 2021-09-22

## 2021-09-22 NOTE — TELEPHONE ENCOUNTER
Patient called and would like to know if she can receive a neck injection.  Patient is trying to schedule one but is checking to see if it is ok    Please advise

## 2021-09-22 NOTE — TELEPHONE ENCOUNTER
Pt had a L5-S1 decompression and bilateral discectomy with placement of interbody cages and transcortical L5-S1 pedicle screw and pete fixation using the [employer] robot on 8/20/2021

## 2021-10-11 ENCOUNTER — TELEPHONE (OUTPATIENT)
Dept: NEUROSURGERY | Facility: CLINIC | Age: 46
End: 2021-10-11

## 2021-10-11 NOTE — TELEPHONE ENCOUNTER
Spoke to pt and advised her that the appt that was scheduled for today (10/11/21) was made in error and was cancelled back on 8/17/21. Pt said she was not released to drive yet and thinks she may need to come back in and discuss that among other issues, next appt with [facility] is the last week in October, plz adv ?

## 2021-10-11 NOTE — TELEPHONE ENCOUNTER
Patient had surgery on 8/20/21- Open lumbar decompression/discectomy lumbar five/sacral one with posterior lumbar interbody fusion with cages and transcortical screw/pete fixation using the [employer] robot. Patient has since had two post-op appointments 9/1/21 and 9/15/21. Neither of the notes state a clearance for driving. This is one of her concerns.

## 2021-10-11 NOTE — TELEPHONE ENCOUNTER
----- Message from Noemí Mcmahon sent at 10/11/2021 11:12 AM EDT -----  Regarding: appt  Patient wanted to know why her follow up appt was cancelled and would like call back.

## 2021-10-12 ENCOUNTER — OFFICE VISIT (OUTPATIENT)
Dept: NEUROSURGERY | Facility: CLINIC | Age: 46
End: 2021-10-12

## 2021-10-12 DIAGNOSIS — Z09 SURGICAL FOLLOW-UP CARE: Primary | ICD-10-CM

## 2021-10-12 PROCEDURE — 99024 POSTOP FOLLOW-UP VISIT: CPT | Performed by: NEUROLOGICAL SURGERY

## 2021-10-12 NOTE — PROGRESS NOTES
Subjective   Patient ID: Oksana Pillai is a 46 y.o. female is here today for follow-up.     She was last seen 9/15/21 for a Surgical follow up.    You have chosen to receive care through a telephone visit. Do you consent to use a telephone visit for your medical care today? Yes    Today patient reports to discuss questions regarding surgery.    This was a televisit from the hospital lasting 11 minutes.  The patient was at home.  Its been almost 8 weeks since her L5-S1 decompression and fusion.  She says the incisions healed well without much discomfort.  She is just using ibuprofen now and some muscle relaxant, no narcotics.  The leg does not have any pain but it still weak but certainly stronger than it was before.  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  They have been doing mainly home PT.  I would agree.  I think she is ready to drive.  So we will take the next step.  I encouraged her and reminded her that it would take a while since the nerve was quite injured from the long-term compression.  We will see her in 6 weeks with x-rays.    History of Present Illness    The following portions of the patient's history were reviewed and updated as appropriate: allergies, current medications, past family history, past medical history, past social history, past surgical history and problem list.    Review of Systems        Objective     There were no vitals filed for this visit.  There is no height or weight on file to calculate BMI.      Physical Exam   Deferred  Neurologic Exam   Deferred        Assessment/Plan   Independent Review of Radiographic Studies:      I personally reviewed the images from the following studies.        Medical Decision Making:      I think she is ready for some outpatient therapy so we will set that up to work on strengthening and core work.  She can drive to go there.  We will see her in 6 weeks with x-rays.      Diagnoses and all orders for this visit:    1. Surgical  follow-up care (Primary)  -     Ambulatory Referral to Physical Therapy Evaluate and treat  -     XR Spine Lumbar Complete With Flex & Ext; Future      Return in about 6 weeks (around 11/23/2021) for Face-to-face.

## 2021-10-25 ENCOUNTER — TELEPHONE (OUTPATIENT)
Dept: NEUROSURGERY | Facility: CLINIC | Age: 46
End: 2021-10-25

## 2021-10-25 NOTE — TELEPHONE ENCOUNTER
Please call patient in regards to her conversation with Dr. Ojeda and fax PT orders to [phone]    Thank You

## 2021-10-26 ENCOUNTER — TELEPHONE (OUTPATIENT)
Dept: NEUROSURGERY | Facility: CLINIC | Age: 46
End: 2021-10-26

## 2021-10-26 NOTE — TELEPHONE ENCOUNTER
Spoke to Oksana Pillai and informed her that her brace can come off and she may resume sexual activity.          I spoke with Oksana Pillai.  She would like to know how long she is to wear the brace and how long before she can have sex with her .

## 2021-10-26 NOTE — TELEPHONE ENCOUNTER
I spoke with Oksana Pillai, How long does she have to wear the back brace, When can she have sex with her  , and I answered the question regarding the epidural .

## 2021-11-10 NOTE — TELEPHONE ENCOUNTER
"She is wanting to know if she can return to work for 32 hours  And if not 32 hours \"full time\" due to her job. Her job is requesting that she respond to them by 1200 today if she is able to do so.vill  " Low Fiber Diet

## 2021-12-13 ENCOUNTER — TELEPHONE (OUTPATIENT)
Dept: NEUROSURGERY | Facility: CLINIC | Age: 46
End: 2021-12-13

## 2021-12-13 NOTE — TELEPHONE ENCOUNTER
Left message for Oksana Pillai to return my call to give me more information as to why she needs a pain block.

## 2021-12-20 ENCOUNTER — TELEPHONE (OUTPATIENT)
Dept: NEUROSURGERY | Facility: CLINIC | Age: 46
End: 2021-12-20

## 2021-12-20 NOTE — TELEPHONE ENCOUNTER
I spoke to Oksana Pillai and she was confused regarding a telephone call she received from our office.

## 2021-12-23 NOTE — PROGRESS NOTES
Subjective   Patient ID: Oksana Pillai is a 46 y.o. female is here today for a 4 month follow up after L5/S1 open decompression/discectomy on 8/20/21.  Pt to have lumbar xray with flex/ext same day.  Pt last seen on 10/12/21 and reported some residual leg weakness.  Pt referred to physical therapy at the visit.    Patient reports today her left low back is painful when she raises up from bending at the waist.  Her thighs are sore laterally to the touch, some days are worse than others.  Her Therapist is trying to build her muscles. Her left leg , if she bends her knee or she is on her knee she gets a firing jolt laterally on her calf.    It is been about 4 months since her L5-S1 decompression and fusion.  She is making progress.  The strength in her right calf is much better than it was preoperatively.  I would assess it now at about 3/5.  She still has some tenderness in the left side of the back which is probably in the sacroiliac region.  She asked if she could get an injection there and I would hold off at least for now.  If if it still bothering her in 2 months and interfering with her rehabilitation then we can ask the pain doctors to do a sacroiliac injection on the left and maybe even an ablation.  She has some intermittent pain in her left calf when she kneels which may be some irritation to the peroneal nerve.  I do not think it is coming from the back itself.  We will keep an eye on that I told her to avoid that position.  We will see her in 2 months.  She will continue her therapy.    History of Present Illness    The following portions of the patient's history were reviewed and updated as appropriate: allergies, current medications, past family history, past medical history, past social history, past surgical history and problem list.    Review of Systems   Constitutional: Negative for fever.   All other systems reviewed and are negative.      Objective   Physical Exam  Constitutional:       Appearance:  She is well-developed.   HENT:      Head: Normocephalic and atraumatic.   Eyes:      Extraocular Movements: EOM normal.      Conjunctiva/sclera: Conjunctivae normal.      Pupils: Pupils are equal, round, and reactive to light.   Neck:      Vascular: No carotid bruit.   Neurological:      Mental Status: She is oriented to person, place, and time.      Coordination: Finger-Nose-Finger Test and Heel to Shin Test normal.      Gait: Gait is intact.      Deep Tendon Reflexes:      Reflex Scores:       Tricep reflexes are 2+ on the right side and 2+ on the left side.       Bicep reflexes are 2+ on the right side and 2+ on the left side.       Brachioradialis reflexes are 2+ on the right side and 2+ on the left side.       Patellar reflexes are 2+ on the right side and 2+ on the left side.       Achilles reflexes are 2+ on the right side and 2+ on the left side.  Psychiatric:         Speech: Speech normal.       Neurologic Exam     Mental Status   Oriented to person, place, and time.   Registration of memory: Good recent and remote memory.   Attention: normal. Concentration: normal.   Speech: speech is normal   Level of consciousness: alert  Knowledge: consistent with education.     Cranial Nerves     CN II   Visual fields full to confrontation.   Visual acuity: normal    CN III, IV, VI   Pupils are equal, round, and reactive to light.  Extraocular motions are normal.     CN V   Facial sensation intact.   Right corneal reflex: normal  Left corneal reflex: normal    CN VII   Facial expression full, symmetric.   Right facial weakness: none  Left facial weakness: none    CN VIII   Hearing: intact    CN IX, X   Palate: symmetric    CN XI   Right sternocleidomastoid strength: normal  Left sternocleidomastoid strength: normal    CN XII   Tongue: not atrophic  Tongue deviation: none    Motor Exam   Muscle bulk: normal  Right arm tone: normal  Left arm tone: normal  Right leg tone: normal  Left leg tone: normal    Strength    Strength 5/5 except as noted.   Right gastroc: 3/5    Sensory Exam   Light touch normal.     Gait, Coordination, and Reflexes     Gait  Gait: normal    Coordination   Finger to nose coordination: normal  Heel to shin coordination: normal    Reflexes   Right brachioradialis: 2+  Left brachioradialis: 2+  Right biceps: 2+  Left biceps: 2+  Right triceps: 2+  Left triceps: 2+  Right patellar: 2+  Left patellar: 2+  Right achilles: 2+  Left achilles: 2+  Right : 2+  Left : 2+      Assessment/Plan   Independent Review of Radiographic Studies:      Plain x-rays done on 12/27/2021 shows good positioning of the cages and the transcortical screws at L5-S1 with the rods.  Agree with the report.      Medical Decision Making:      Making reasonable progress particular in terms of the strength.  She will continue therapy and I will see her in 2 months.  If the left-sided pain continues unabated then we can consider a sacroiliac block and perhaps even ablation on that side but will wait for another 2 months.      Diagnoses and all orders for this visit:    1. Calf muscle weakness (Primary)    2. DDD (degenerative disc disease), lumbar    3. History of lumbar spinal fusion      Return in about 2 months (around 2/27/2022) for Face-to-face.

## 2021-12-27 ENCOUNTER — HOSPITAL ENCOUNTER (OUTPATIENT)
Dept: GENERAL RADIOLOGY | Facility: HOSPITAL | Age: 46
Discharge: HOME | End: 2021-12-27
Admitting: NEUROLOGICAL SURGERY

## 2021-12-27 ENCOUNTER — OFFICE VISIT (OUTPATIENT)
Dept: NEUROSURGERY | Facility: CLINIC | Age: 46
End: 2021-12-27

## 2021-12-27 VITALS
BODY MASS INDEX: 34.03 KG/M2 | HEIGHT: 67 IN | SYSTOLIC BLOOD PRESSURE: 118 MMHG | DIASTOLIC BLOOD PRESSURE: 72 MMHG | TEMPERATURE: 97.9 F | WEIGHT: 216.8 LBS | HEART RATE: 68 BPM | OXYGEN SATURATION: 96 %

## 2021-12-27 DIAGNOSIS — M51.36 DDD (DEGENERATIVE DISC DISEASE), LUMBAR: ICD-10-CM

## 2021-12-27 DIAGNOSIS — Z09 SURGICAL FOLLOW-UP CARE: ICD-10-CM

## 2021-12-27 DIAGNOSIS — Z98.1 HISTORY OF LUMBAR SPINAL FUSION: ICD-10-CM

## 2021-12-27 DIAGNOSIS — M62.81 CALF MUSCLE WEAKNESS: Primary | ICD-10-CM

## 2021-12-27 PROCEDURE — 72114 X-RAY EXAM L-S SPINE BENDING: CPT

## 2021-12-27 PROCEDURE — 99213 OFFICE O/P EST LOW 20 MIN: CPT | Performed by: NEUROLOGICAL SURGERY

## 2021-12-27 RX ORDER — NAPROXEN 500 MG/1
500 TABLET ORAL
COMMUNITY
Start: 2021-10-27

## 2022-03-11 ENCOUNTER — TELEPHONE (OUTPATIENT)
Dept: NEUROSURGERY | Facility: CLINIC | Age: 47
End: 2022-03-11

## 2022-03-11 NOTE — TELEPHONE ENCOUNTER
Pt had Open lumbar decompression/discectomy lumbar five/sacral one with posterior lumbar interbody fusion with cages and transcortical screw/pete fixation using the [employer] robot on 8/20/21.      She called to say she has LBP/L leg/buttock pain that is increasing, making it hard for her to get around.    She has been taking Ibuprofen 800 mg tid for the pain.    She has an appt coming up with Dr Cody (pain mgt) and is ready to move forward with blocks and possible ablation.  Send referral to Dr Collier.    Does not see you until 6/8/22.  Pt [phone]

## 2022-03-16 DIAGNOSIS — M62.81 CALF MUSCLE WEAKNESS: Primary | ICD-10-CM

## 2022-03-16 NOTE — TELEPHONE ENCOUNTER
Spoke with pt.  Put in SI joint injection and faxed to Dr Collier @ [facility].  [phone]

## 2022-03-16 NOTE — PROGRESS NOTES
SI injection/possible SI RFA ordered and will be sent to Dr Collier @ [facility].

## 2022-04-12 ENCOUNTER — TELEPHONE (OUTPATIENT)
Dept: NEUROSURGERY | Facility: CLINIC | Age: 47
End: 2022-04-12

## 2022-04-12 NOTE — TELEPHONE ENCOUNTER
Patient called the office today and wanted some clarifying questions regarding the SI joint injection and RFA. I advised the patient that we did order her to have an SI joint injection. She is established with Dr. Collier for pain management. I advised her that if there is necessity for medial branch block or RFA they would be the specialist to make that decision. She voiced understanding and would call us back if she needed anything further from us before her appointment with Dr. Ojeda on 6/8/22.

## 2022-06-06 ENCOUNTER — TELEPHONE (OUTPATIENT)
Dept: NEUROSURGERY | Facility: CLINIC | Age: 47
End: 2022-06-06

## 2022-06-06 NOTE — TELEPHONE ENCOUNTER
I S/W PT TO CANCEL APPT WITH DR VARGAS FOR 6/8/22 AT 3:30PM DUE TO DR MENDES BEING SICK AND OUT OF THE OFFICE. PT ADVISED WE WILL BE CONTACTING HER TO RESCHEDULE. PT VOICED UNDERSTANDING.

## 2022-07-22 ENCOUNTER — TELEPHONE (OUTPATIENT)
Dept: NEUROSURGERY | Facility: CLINIC | Age: 47
End: 2022-07-22

## 2022-07-27 ENCOUNTER — OFFICE VISIT (OUTPATIENT)
Dept: NEUROSURGERY | Facility: CLINIC | Age: 47
End: 2022-07-27

## 2022-07-27 VITALS
DIASTOLIC BLOOD PRESSURE: 70 MMHG | WEIGHT: 216.93 LBS | TEMPERATURE: 97.7 F | SYSTOLIC BLOOD PRESSURE: 114 MMHG | HEIGHT: 67 IN | BODY MASS INDEX: 34.05 KG/M2 | HEART RATE: 70 BPM | OXYGEN SATURATION: 98 %

## 2022-07-27 DIAGNOSIS — Z98.1 HISTORY OF LUMBAR SPINAL FUSION: ICD-10-CM

## 2022-07-27 DIAGNOSIS — M53.3 SACROILIAC JOINT DISEASE: ICD-10-CM

## 2022-07-27 DIAGNOSIS — M62.81 CALF MUSCLE WEAKNESS: Primary | ICD-10-CM

## 2022-07-27 DIAGNOSIS — M51.36 DDD (DEGENERATIVE DISC DISEASE), LUMBAR: ICD-10-CM

## 2022-07-27 PROCEDURE — 99214 OFFICE O/P EST MOD 30 MIN: CPT | Performed by: NEUROLOGICAL SURGERY

## 2022-07-27 NOTE — PROGRESS NOTES
Subjective   Patient ID: Oksana Pillai is a 46 y.o. female is here today for follow-up for back pain. She had lumbar decompression and fusion in August 2021.     Ms. Pillai reports she is doing better than she was prior to surgery. She reports still walking with a limp, and not able to walk as fast and her stride is not as far. She is taking ibuprofen to help allow her to complete therapy. She does not have much strength in her right leg. She reports her legs are constantly sore to the touch. She has noticed a return of numbness and tingling of her little pinky toe. She reports a lot of foot pain. She reports she can do activity in short times. She reports with standing for long periods of time her back becomes stiff and with taking a step she is feeling pain at her waistline. She is only able to do things in short stents. She is still doing formal therapy once a week.     Its been almost a year since she underwent an L5-S1 decompression and fusion.  She had had a right L5-S1 microdiscectomy 4 years earlier.  She does not have any significant pain in the right leg but her calf is still weak and is probably going to display some permanent weakness.  I encouraged her to continue working on therapy as it may incrementally help.  She switched her pain management over to Dr. Collier.  She got a left sacroiliac injection and also a right injection and she feels a little bit better.  She supposed to see him in a few months.  I told her to convey to him that I would not have any objection to a sacroiliac ablation but she is otherwise doing well despite the fact that she has permanent right calf weakness.  I will see her in 6 months with x-rays to check up on the fusion.  Overall she is doing better as far as pain goes.    Back Pain  This is a chronic problem. The current episode started more than 1 year ago. The problem occurs constantly. The problem has been gradually improving since onset. The pain is present in the lumbar  "spine. The quality of the pain is described as aching. The pain does not radiate. The pain is at a severity of 4/10. The pain is mild. The symptoms are aggravated by standing. Associated symptoms include numbness, tingling and weakness. Pertinent negatives include no bladder incontinence, bowel incontinence or leg pain. She has tried home exercises and muscle relaxant for the symptoms. The treatment provided mild relief.       The following portions of the patient's history were reviewed and updated as appropriate: allergies, current medications, past family history, past medical history, past social history, past surgical history and problem list.    Review of Systems   Constitutional: Positive for activity change and fatigue.   Gastrointestinal: Negative for bowel incontinence.   Genitourinary: Negative for bladder incontinence.   Musculoskeletal: Positive for back pain.   Neurological: Positive for tingling, weakness and numbness.   Psychiatric/Behavioral: Negative for sleep disturbance.   All other systems reviewed and are negative.          Objective     Vitals:    07/27/22 1555   BP: 114/70   Pulse: 70   Temp: 97.7 °F (36.5 °C)   SpO2: 98%   Weight: 98.4 kg (216 lb 14.9 oz)   Height: 170.2 cm (67\")     Body mass index is 33.98 kg/m².      Physical Exam  Constitutional:       Appearance: She is well-developed.   HENT:      Head: Normocephalic and atraumatic.   Eyes:      Extraocular Movements: EOM normal.      Conjunctiva/sclera: Conjunctivae normal.      Pupils: Pupils are equal, round, and reactive to light.   Neck:      Vascular: No carotid bruit.   Neurological:      Mental Status: She is oriented to person, place, and time.      Coordination: Finger-Nose-Finger Test and Heel to Shin Test normal.      Gait: Gait is intact.      Deep Tendon Reflexes:      Reflex Scores:       Tricep reflexes are 2+ on the right side and 2+ on the left side.       Bicep reflexes are 2+ on the right side and 2+ on the left " side.       Brachioradialis reflexes are 2+ on the right side and 2+ on the left side.       Patellar reflexes are 2+ on the right side and 2+ on the left side.       Achilles reflexes are 2+ on the right side and 2+ on the left side.  Psychiatric:         Speech: Speech normal.       Neurologic Exam     Mental Status   Oriented to person, place, and time.   Registration of memory: Good recent and remote memory.   Attention: normal. Concentration: normal.   Speech: speech is normal   Level of consciousness: alert  Knowledge: consistent with education.     Cranial Nerves     CN II   Visual fields full to confrontation.   Visual acuity: normal    CN III, IV, VI   Pupils are equal, round, and reactive to light.  Extraocular motions are normal.     CN V   Facial sensation intact.   Right corneal reflex: normal  Left corneal reflex: normal    CN VII   Facial expression full, symmetric.   Right facial weakness: none  Left facial weakness: none    CN VIII   Hearing: intact    CN IX, X   Palate: symmetric    CN XI   Right sternocleidomastoid strength: normal  Left sternocleidomastoid strength: normal    CN XII   Tongue: not atrophic  Tongue deviation: none    Motor Exam   Muscle bulk: normal  Right arm tone: normal  Left arm tone: normal  Right leg tone: normal  Left leg tone: normal    Strength   Strength 5/5 except as noted.   Right gastroc: 3/5    Sensory Exam   Light touch normal.     Gait, Coordination, and Reflexes     Gait  Gait: normal    Coordination   Finger to nose coordination: normal  Heel to shin coordination: normal    Reflexes   Right brachioradialis: 2+  Left brachioradialis: 2+  Right biceps: 2+  Left biceps: 2+  Right triceps: 2+  Left triceps: 2+  Right patellar: 2+  Left patellar: 2+  Right achilles: 2+  Left achilles: 2+  Right : 2+  Left : 2+          Assessment & Plan   Independent Review of Radiographic Studies:      I personally reviewed the images from the following studies.    Plain  x-rays done on 12/27/2021 shows good positioning of the cages and the transcortical screws at L5-S1 with the rods.  Agree with the report.    Medical Decision Making:      She will continue working with Dr. Collier and perhaps get a left sacroiliac ablation.  She will continue working with her therapist.  I will see her in 6 months with x-rays.  If there is a severe flareup, she will let me know.      Diagnoses and all orders for this visit:    1. Calf muscle weakness (Primary)    2. DDD (degenerative disc disease), lumbar    3. History of lumbar spinal fusion  -     XR Spine Lumbar Complete With Flex & Ext; Future    4. Sacroiliac joint disease      Return in about 6 months (around 1/27/2023) for Face-to-face.

## 2022-09-23 ENCOUNTER — APPOINTMENT (OUTPATIENT)
Dept: WOMENS IMAGING | Facility: HOSPITAL | Age: 47
End: 2022-09-23

## 2022-09-23 PROCEDURE — 77067 SCR MAMMO BI INCL CAD: CPT | Performed by: RADIOLOGY

## 2022-09-23 PROCEDURE — 77063 BREAST TOMOSYNTHESIS BI: CPT | Performed by: RADIOLOGY

## 2022-12-09 NOTE — PROGRESS NOTES
Subjective   Patient ID: Oksana Pillai is a 47 y.o. female is here today for follow-up of calf muscle weakness  Pt had lumbar decom/fusion in 8/2021.  Pt last seen on 7/27/22 and reported back pain with N/T/W. Pt instructed to continue with Dr Collier and her physical therapist.    Today patient states that her symptoms are unchanged. Patient has had a RFA in the past week. Patient states that she has numbness in the R pinky toe.     Its been 16 months since her L5-S1 fusion.  Its been 5 years since her original right L5-S1 microdiscectomy.  She has made good progress.  She had the sacral ablation by Dr. Collier this last week and the initial results have been promising.  Her right calf weakness has improved although there is some baseline weakness that is subtle.  She is probably lost permanently some sensation in the lateral part of the right foot with a little bit of mild instability of the foot but she has compensated for it.  She is working on her core exercises and is probably going to start Pilates.  We went over do's and don'ts and basically I do not want her to do any high impact exercise activities and to use good judgment.  We will keep an eye on her and see her in 8 months with x-rays.  That will be the 2-year rico from surgery.  If she stable we can see her on a yearly basis.    If things worsen between now and then.  She will let us know.           Back Pain  This is a recurrent problem. The problem occurs intermittently. The pain is present in the lumbar spine. The pain is at a severity of 3/10. The pain is moderate. The symptoms are aggravated by standing. Associated symptoms include numbness. Pertinent negatives include no dysuria or fever. She has tried heat, bed rest and NSAIDs for the symptoms. The treatment provided mild relief.       The following portions of the patient's history were reviewed and updated as appropriate: allergies, current medications, past family history, past medical history,  past social history, past surgical history and problem list.    Review of Systems   Constitutional: Negative for chills and fever.   HENT: Negative for congestion.    Genitourinary: Negative for difficulty urinating and dysuria.   Musculoskeletal: Positive for back pain and gait problem.   Neurological: Positive for numbness.        R pinky toe   All other systems reviewed and are negative.      Objective   Physical Exam  Constitutional:       Appearance: She is well-developed.   HENT:      Head: Normocephalic and atraumatic.   Eyes:      Extraocular Movements: EOM normal.      Conjunctiva/sclera: Conjunctivae normal.      Pupils: Pupils are equal, round, and reactive to light.   Neck:      Vascular: No carotid bruit.   Neurological:      Mental Status: She is oriented to person, place, and time.      Coordination: Finger-Nose-Finger Test and Heel to Shin Test normal.      Gait: Gait is intact.      Deep Tendon Reflexes:      Reflex Scores:       Tricep reflexes are 2+ on the right side and 2+ on the left side.       Bicep reflexes are 2+ on the right side and 2+ on the left side.       Brachioradialis reflexes are 2+ on the right side and 2+ on the left side.       Patellar reflexes are 2+ on the right side and 2+ on the left side.       Achilles reflexes are 2+ on the right side and 2+ on the left side.  Psychiatric:         Speech: Speech normal.       Neurologic Exam     Mental Status   Oriented to person, place, and time.   Registration of memory: Good recent and remote memory.   Attention: normal. Concentration: normal.   Speech: speech is normal   Level of consciousness: alert  Knowledge: consistent with education.     Cranial Nerves     CN II   Visual fields full to confrontation.   Visual acuity: normal    CN III, IV, VI   Pupils are equal, round, and reactive to light.  Extraocular motions are normal.     CN V   Facial sensation intact.   Right corneal reflex: normal  Left corneal reflex: normal    CN VII    Facial expression full, symmetric.   Right facial weakness: none  Left facial weakness: none    CN VIII   Hearing: intact    CN IX, X   Palate: symmetric    CN XI   Right sternocleidomastoid strength: normal  Left sternocleidomastoid strength: normal    CN XII   Tongue: not atrophic  Tongue deviation: none    Motor Exam   Muscle bulk: normal  Right arm tone: normal  Left arm tone: normal  Right leg tone: normal  Left leg tone: normal    Strength   Strength 5/5 except as noted. 4+/5 strength in the right gastrocnemius     Sensory Exam   Light touch normal.     Gait, Coordination, and Reflexes     Gait  Gait: normal    Coordination   Finger to nose coordination: normal  Heel to shin coordination: normal    Reflexes   Right brachioradialis: 2+  Left brachioradialis: 2+  Right biceps: 2+  Left biceps: 2+  Right triceps: 2+  Left triceps: 2+  Right patellar: 2+  Left patellar: 2+  Right achilles: 2+  Left achilles: 2+  Right : 2+  Left : 2+      Assessment & Plan   Independent Review of Radiographic Studies:      Plain x-rays done on 12/27/2022 show good positioning of the interbody cages at L5-S1 and the pedicle screws and rods.  Agree with the report.      Medical Decision Making:      She will continue working with Dr. Collier.  She will work on her exercises including Pilates and see me in 8 months with x-rays.  We will continue to follow her yearly in the event that she develops adjacent level disease.      Diagnoses and all orders for this visit:    1. Calf muscle weakness (Primary)  -     XR Spine Lumbar Complete With Flex & Ext; Future    2. DDD (degenerative disc disease), lumbar  -     XR Spine Lumbar Complete With Flex & Ext; Future    3. History of lumbar spinal fusion  -     XR Spine Lumbar Complete With Flex & Ext; Future    4. Sacroiliac joint disease  -     XR Spine Lumbar Complete With Flex & Ext; Future      Return in about 8 months (around 8/28/2023) for Face-to-face.

## 2022-12-27 ENCOUNTER — HOSPITAL ENCOUNTER (OUTPATIENT)
Dept: GENERAL RADIOLOGY | Facility: HOSPITAL | Age: 47
Discharge: HOME | End: 2022-12-27
Admitting: NEUROLOGICAL SURGERY

## 2022-12-27 DIAGNOSIS — Z98.1 HISTORY OF LUMBAR SPINAL FUSION: ICD-10-CM

## 2022-12-27 PROCEDURE — 72114 X-RAY EXAM L-S SPINE BENDING: CPT

## 2022-12-28 ENCOUNTER — OFFICE VISIT (OUTPATIENT)
Dept: NEUROSURGERY | Facility: CLINIC | Age: 47
End: 2022-12-28

## 2022-12-28 VITALS
HEART RATE: 90 BPM | TEMPERATURE: 97.8 F | SYSTOLIC BLOOD PRESSURE: 110 MMHG | BODY MASS INDEX: 34.05 KG/M2 | OXYGEN SATURATION: 97 % | DIASTOLIC BLOOD PRESSURE: 82 MMHG | WEIGHT: 216.93 LBS | HEIGHT: 67 IN

## 2022-12-28 DIAGNOSIS — Z98.1 HISTORY OF LUMBAR SPINAL FUSION: ICD-10-CM

## 2022-12-28 DIAGNOSIS — M53.3 SACROILIAC JOINT DISEASE: ICD-10-CM

## 2022-12-28 DIAGNOSIS — M51.36 DDD (DEGENERATIVE DISC DISEASE), LUMBAR: ICD-10-CM

## 2022-12-28 DIAGNOSIS — M62.81 CALF MUSCLE WEAKNESS: Primary | ICD-10-CM

## 2022-12-28 PROCEDURE — 99213 OFFICE O/P EST LOW 20 MIN: CPT | Performed by: NEUROLOGICAL SURGERY

## 2022-12-28 RX ORDER — METRONIDAZOLE 7.5 MG/G
GEL TOPICAL
COMMUNITY
Start: 2022-10-07

## 2023-04-28 ENCOUNTER — TELEPHONE (OUTPATIENT)
Dept: GASTROENTEROLOGY | Facility: CLINIC | Age: 48
End: 2023-04-28

## 2023-04-28 NOTE — TELEPHONE ENCOUNTER
KARRIE MALHOTRA/[facility] IN  [place] CALLED AND IS WANTING MEDICAL RECORDS FOR THIS PT.  HER NUMBER [phone] AND HER FAX NUMBER [phone].

## 2023-05-10 ENCOUNTER — OFFICE VISIT (OUTPATIENT)
Dept: NEUROSURGERY | Facility: CLINIC | Age: 48
End: 2023-05-10
Payer: COMMERCIAL

## 2023-05-10 DIAGNOSIS — M62.81 CALF MUSCLE WEAKNESS: Primary | ICD-10-CM

## 2023-05-10 DIAGNOSIS — M51.36 DDD (DEGENERATIVE DISC DISEASE), LUMBAR: ICD-10-CM

## 2023-05-10 DIAGNOSIS — Z98.1 HISTORY OF LUMBAR SPINAL FUSION: ICD-10-CM

## 2023-05-10 DIAGNOSIS — M53.3 SACROILIAC JOINT DISEASE: ICD-10-CM

## 2023-05-10 PROCEDURE — 99442 PR PHYS/QHP TELEPHONE EVALUATION 11-20 MIN: CPT | Performed by: NEUROLOGICAL SURGERY

## 2023-05-10 NOTE — PROGRESS NOTES
Subjective   Patient ID: Oksana Pillai is a 47 y.o. female is here today for follow-up.    You have chosen to receive care through a telephone visit. Do you consent to use a telephone visit for your medical care today? Yes     Unable to complete visit using a video connection to the patient. A phone visit was used to complete this visits. Total time of discussion was 11 minutes.     I was calling from the office.  She was at home.  Over the last few months has been having some recurrent symptoms of pain and weakness involving the right side of the back and the leg.  She has been working on her exercises and her therapist thinks that there may be some weakness in the piriformis muscle.  She has not had any further injection therapies since have seen her last December.  She had a sacral ablation by Dr. Collier late last year and it did seem to help.  Its been a year and a half since her L5-S1 fusion.  There is always a concern about adjacent level disease at L5-S1.  Her x-rays look fine the last time I spoke to her in December but I think we should get a new MRI to see if there is any changes at the L4-L5 level compared to her preoperative study.  We will get those done and have her come back to see me prior to her August visit that was already scheduled.      History of Present Illness    The following portions of the patient's history were reviewed and updated as appropriate: allergies, current medications, past family history, past medical history, past social history, past surgical history and problem list.    Review of Systems    Objective   Physical Exam  Neurologic Exam    Assessment & Plan   Independent Review of Radiographic Studies:      Plain x-rays done on 12/27/2022 show good positioning of the interbody cages at L5-S1 and the pedicle screws and rods.  Agree with the report.    Medical Decision Making:      We will go ahead and get a new lumbar MRI with and without contrast and move her follow-up visit to a  few weeks from now after the picture which we will discuss.      Diagnoses and all orders for this visit:    1. Calf muscle weakness (Primary)  -     MRI Lumbar Spine With & Without Contrast; Future    2. DDD (degenerative disc disease), lumbar  -     MRI Lumbar Spine With & Without Contrast; Future    3. History of lumbar spinal fusion  -     MRI Lumbar Spine With & Without Contrast; Future    4. Sacroiliac joint disease  -     MRI Lumbar Spine With & Without Contrast; Future      Return in about 4 weeks (around 6/7/2023) for Face-to-face after MRI, cancel 8/28/2023 appointment.

## 2023-06-02 NOTE — PROGRESS NOTES
"Subjective   Patient ID: Oksana Pillai is a 47 y.o. female is here today for follow-up after MRI    Been almost 2 years since her L5-S1 fusion.  She is with her .  We got a new MRI because she was complaining of new and increased right leg pain and a sense of weakness above her usual baseline documented below.  We got an MRI and it did not show anything new.  The degree of adjacent level disease is quite minimal.  She was reassured.  Nothing operative.  It may be that the compromise nerve assist acting up and causing radicular pain signals.  She has not been on gabapentin for a while and is willing to try it again.  We will start at 300 mg twice daily.  She will continue her usual exercise program.  She continues to work with her physical therapist.  We will see her in 4 months.  To keep us abreast of any potential side effects of the gabapentin such as sleepiness or mental fogginess.        History of Present Illness    The following portions of the patient's history were reviewed and updated as appropriate: allergies, current medications, past family history, past medical history, past social history, past surgical history, and problem list.    Review of Systems   Constitutional:  Negative for fever.   Musculoskeletal:  Positive for back pain.   Neurological:  Negative for weakness and numbness.   All other systems reviewed and are negative.        Objective     Vitals:    06/08/23 1211   BP: 124/72   BP Location: Right arm   Patient Position: Sitting   Cuff Size: Adult   Weight: 107 kg (235 lb)   Height: 170.2 cm (67.01\")     Body mass index is 36.8 kg/m².    Tobacco Use: Low Risk    • Smoking Tobacco Use: Never   • Smokeless Tobacco Use: Never   • Passive Exposure: Not on file          Physical Exam  Constitutional:       Appearance: She is well-developed.   HENT:      Head: Normocephalic and atraumatic.   Eyes:      Extraocular Movements: EOM normal.      Conjunctiva/sclera: Conjunctivae normal.      " Pupils: Pupils are equal, round, and reactive to light.   Neck:      Vascular: No carotid bruit.   Neurological:      Mental Status: She is oriented to person, place, and time.      Coordination: Finger-Nose-Finger Test and Heel to Shin Test normal.      Gait: Gait is intact.      Deep Tendon Reflexes:      Reflex Scores:       Tricep reflexes are 2+ on the right side and 2+ on the left side.       Bicep reflexes are 2+ on the right side and 2+ on the left side.       Brachioradialis reflexes are 2+ on the right side and 2+ on the left side.       Patellar reflexes are 2+ on the right side and 2+ on the left side.       Achilles reflexes are 2+ on the right side and 2+ on the left side.  Psychiatric:         Speech: Speech normal.     Neurologic Exam     Mental Status   Oriented to person, place, and time.   Registration of memory: Good recent and remote memory.   Attention: normal. Concentration: normal.   Speech: speech is normal   Level of consciousness: alert  Knowledge: consistent with education.     Cranial Nerves     CN II   Visual fields full to confrontation.   Visual acuity: normal    CN III, IV, VI   Pupils are equal, round, and reactive to light.  Extraocular motions are normal.     CN V   Facial sensation intact.   Right corneal reflex: normal  Left corneal reflex: normal    CN VII   Facial expression full, symmetric.   Right facial weakness: none  Left facial weakness: none    CN VIII   Hearing: intact    CN IX, X   Palate: symmetric    CN XI   Right sternocleidomastoid strength: normal  Left sternocleidomastoid strength: normal    CN XII   Tongue: not atrophic  Tongue deviation: none    Motor Exam   Muscle bulk: normal  Right arm tone: normal  Left arm tone: normal  Right leg tone: normal  Left leg tone: normal    Strength   Strength 5/5 except as noted.   Right gastroc: 4/5    Sensory Exam   Light touch normal.     Gait, Coordination, and Reflexes     Gait  Gait: normal    Coordination   Finger to  nose coordination: normal  Heel to shin coordination: normal    Reflexes   Right brachioradialis: 2+  Left brachioradialis: 2+  Right biceps: 2+  Left biceps: 2+  Right triceps: 2+  Left triceps: 2+  Right patellar: 2+  Left patellar: 2+  Right achilles: 2+  Left achilles: 2+  Right : 2+  Left : 2+        Assessment & Plan   Independent Review of Radiographic Studies:      I personally reviewed the images from the following studies.    Reviewed the MRI done on 6/5/2023 which shows a wide open patent L5-S1 canal.  I do not think there is much progression in the very mild amount of degeneration and lateral recess stenosis at L4-L5.  Agree with the report.        Medical Decision Making:      Fortunately, nothing reoperative.  She will continue her home exercises and will try some gabapentin and 3 mg twice daily.  We will see her in about 4 months.    Diagnoses and all orders for this visit:    1. Calf muscle weakness (Primary)  -     gabapentin (NEURONTIN) 300 MG capsule; Take 1 capsule p.o. nightly x7 days then twice daily  Dispense: 60 capsule; Refill: 5    2. DDD (degenerative disc disease), lumbar  -     gabapentin (NEURONTIN) 300 MG capsule; Take 1 capsule p.o. nightly x7 days then twice daily  Dispense: 60 capsule; Refill: 5    3. History of lumbar spinal fusion  -     gabapentin (NEURONTIN) 300 MG capsule; Take 1 capsule p.o. nightly x7 days then twice daily  Dispense: 60 capsule; Refill: 5    4. Sacroiliac joint disease  -     gabapentin (NEURONTIN) 300 MG capsule; Take 1 capsule p.o. nightly x7 days then twice daily  Dispense: 60 capsule; Refill: 5      Return in about 4 months (around 10/8/2023) for face to face.

## 2023-06-05 ENCOUNTER — APPOINTMENT (OUTPATIENT)
Dept: OTHER | Facility: HOSPITAL | Age: 48
End: 2023-06-05
Payer: COMMERCIAL

## 2023-06-05 ENCOUNTER — HOSPITAL ENCOUNTER (OUTPATIENT)
Dept: MRI IMAGING | Facility: HOSPITAL | Age: 48
Discharge: HOME | End: 2023-06-05
Payer: COMMERCIAL

## 2023-06-05 DIAGNOSIS — M62.81 CALF MUSCLE WEAKNESS: ICD-10-CM

## 2023-06-05 DIAGNOSIS — M53.3 SACROILIAC JOINT DISEASE: ICD-10-CM

## 2023-06-05 DIAGNOSIS — M51.36 DDD (DEGENERATIVE DISC DISEASE), LUMBAR: ICD-10-CM

## 2023-06-05 DIAGNOSIS — Z98.1 HISTORY OF LUMBAR SPINAL FUSION: ICD-10-CM

## 2023-06-05 PROCEDURE — A9577 INJ MULTIHANCE: HCPCS | Performed by: NEUROLOGICAL SURGERY

## 2023-06-05 PROCEDURE — 0 GADOBENATE DIMEGLUMINE 529 MG/ML SOLUTION: Performed by: NEUROLOGICAL SURGERY

## 2023-06-05 PROCEDURE — 72158 MRI LUMBAR SPINE W/O & W/DYE: CPT

## 2023-06-05 RX ADMIN — GADOBENATE DIMEGLUMINE 20 ML: 529 INJECTION, SOLUTION INTRAVENOUS at 09:47

## 2023-06-08 ENCOUNTER — OFFICE VISIT (OUTPATIENT)
Dept: NEUROSURGERY | Facility: CLINIC | Age: 48
End: 2023-06-08
Payer: COMMERCIAL

## 2023-06-08 VITALS
SYSTOLIC BLOOD PRESSURE: 124 MMHG | BODY MASS INDEX: 36.88 KG/M2 | WEIGHT: 235 LBS | DIASTOLIC BLOOD PRESSURE: 72 MMHG | HEIGHT: 67 IN

## 2023-06-08 DIAGNOSIS — M62.81 CALF MUSCLE WEAKNESS: Primary | ICD-10-CM

## 2023-06-08 DIAGNOSIS — M51.36 DDD (DEGENERATIVE DISC DISEASE), LUMBAR: ICD-10-CM

## 2023-06-08 DIAGNOSIS — M53.3 SACROILIAC JOINT DISEASE: ICD-10-CM

## 2023-06-08 DIAGNOSIS — Z98.1 HISTORY OF LUMBAR SPINAL FUSION: ICD-10-CM

## 2023-06-08 PROCEDURE — 99214 OFFICE O/P EST MOD 30 MIN: CPT | Performed by: NEUROLOGICAL SURGERY

## 2023-06-08 RX ORDER — GABAPENTIN 300 MG/1
CAPSULE ORAL
Qty: 60 CAPSULE | Refills: 5 | Status: SHIPPED | OUTPATIENT
Start: 2023-06-08

## 2023-10-16 ENCOUNTER — OFFICE VISIT (OUTPATIENT)
Dept: NEUROSURGERY | Facility: CLINIC | Age: 48
End: 2023-10-16
Payer: COMMERCIAL

## 2023-10-16 VITALS
SYSTOLIC BLOOD PRESSURE: 122 MMHG | HEIGHT: 67 IN | WEIGHT: 235 LBS | BODY MASS INDEX: 36.88 KG/M2 | DIASTOLIC BLOOD PRESSURE: 74 MMHG

## 2023-10-16 DIAGNOSIS — M62.81 CALF MUSCLE WEAKNESS: Primary | ICD-10-CM

## 2023-10-16 DIAGNOSIS — Z98.1 HISTORY OF LUMBAR SPINAL FUSION: ICD-10-CM

## 2023-10-16 DIAGNOSIS — M53.3 SACROILIAC JOINT DISEASE: ICD-10-CM

## 2023-10-16 DIAGNOSIS — M51.36 DDD (DEGENERATIVE DISC DISEASE), LUMBAR: ICD-10-CM

## 2023-10-16 PROCEDURE — 99214 OFFICE O/P EST MOD 30 MIN: CPT | Performed by: NEUROLOGICAL SURGERY

## 2023-10-16 NOTE — LETTER
October 16, 2023       No Recipients    Patient: Oksana Pillai   YOB: 1975   Date of Visit: 10/16/2023     Dear Olinda Winters MD:       Thank you for referring Oksana Pillai to me for evaluation. Below are the relevant portions of my assessment and plan of care.    If you have questions, please do not hesitate to call me. I look forward to following Oksana along with you.         Sincerely,        Brian Ojeda MD        CC:   No Recipients    Brian Ojeda MD  10/16/23 1116  Sign when Signing Visit  Subjective  Patient ID: Oksana Pillai is a 48 y.o. female is here today for follow-up.    She is here with her .  Its been about 2 years since her L5-S1 fusion.  Because of the recurrence of more right-sided sciatica we put her on some gabapentin at 300 mg twice daily.  She could not tolerate the daytime dose so she takes it all at night at 600 mg nightly and it does seem to help.  She continues to work with Dr. Collier and recently he has done a right-sided SI block.  She has had an ablation in the past.  She does not recall having had an epidural block anytime recently.  I told her to speak with him about trying a right L5-S1 transforaminal epidural block.  She and her  wanted to know if the weakness documented below is about as good as it is going to get and I think it probably is at that point since she is about 2 years out from her fusion.  We did not see anything new going on on the latest MRI.  As far as the pain goes they wanted to know what other options there were and I talked about spinal cord stimulation.  We have not had this discussion before.  I think it is probably not quite that where there yet but we might at some point and so we discussed it and I told him to do some research about it.  We will reassess in 6 months.  She is also struggling with her weight and I told her to speak with her PCP about semaglutide's which might help her back if she lost more weight.  I stressed  "that it would not cure her but it might have a positive impact.    History of Present Illness    The following portions of the patient's history were reviewed and updated as appropriate: allergies, current medications, past family history, past medical history, past social history, past surgical history, and problem list.    Review of Systems   Constitutional:  Negative for fever.   Neurological:  Positive for weakness and numbness.   All other systems reviewed and are negative.          Objective    Vitals:    10/16/23 1002   BP: 122/74   BP Location: Left arm   Patient Position: Sitting   Cuff Size: Adult   Weight: 107 kg (235 lb)   Height: 170.2 cm (67.01\")   PainSc:   4     Body mass index is 36.8 kg/m².    Tobacco Use: Low Risk  (10/16/2023)    Patient History    • Smoking Tobacco Use: Never    • Smokeless Tobacco Use: Never    • Passive Exposure: Not on file          Physical Exam  Constitutional:       Appearance: She is well-developed.   HENT:      Head: Normocephalic and atraumatic.   Eyes:      Extraocular Movements: EOM normal.      Conjunctiva/sclera: Conjunctivae normal.      Pupils: Pupils are equal, round, and reactive to light.   Neck:      Vascular: No carotid bruit.   Neurological:      Mental Status: She is oriented to person, place, and time.      Coordination: Finger-Nose-Finger Test and Heel to Shin Test normal.      Gait: Gait is intact.      Deep Tendon Reflexes:      Reflex Scores:       Tricep reflexes are 2+ on the right side and 2+ on the left side.       Bicep reflexes are 2+ on the right side and 2+ on the left side.       Brachioradialis reflexes are 2+ on the right side and 2+ on the left side.       Patellar reflexes are 2+ on the right side and 2+ on the left side.       Achilles reflexes are 2+ on the right side and 2+ on the left side.  Psychiatric:         Speech: Speech normal.       Neurologic Exam     Mental Status   Oriented to person, place, and time.   Registration of " memory: Good recent and remote memory.   Attention: normal. Concentration: normal.   Speech: speech is normal   Level of consciousness: alert  Knowledge: consistent with education.     Cranial Nerves     CN II   Visual fields full to confrontation.   Visual acuity: normal    CN III, IV, VI   Pupils are equal, round, and reactive to light.  Extraocular motions are normal.     CN V   Facial sensation intact.   Right corneal reflex: normal  Left corneal reflex: normal    CN VII   Facial expression full, symmetric.   Right facial weakness: none  Left facial weakness: none    CN VIII   Hearing: intact    CN IX, X   Palate: symmetric    CN XI   Right sternocleidomastoid strength: normal  Left sternocleidomastoid strength: normal    CN XII   Tongue: not atrophic  Tongue deviation: none    Motor Exam   Muscle bulk: normal  Right arm tone: normal  Left arm tone: normal  Right leg tone: normal  Left leg tone: normal    Strength   Strength 5/5 except as noted.   Right gastroc: 4/5    Sensory Exam   Light touch normal.     Gait, Coordination, and Reflexes     Gait  Gait: normal    Coordination   Finger to nose coordination: normal  Heel to shin coordination: normal    Reflexes   Right brachioradialis: 2+  Left brachioradialis: 2+  Right biceps: 2+  Left biceps: 2+  Right triceps: 2+  Left triceps: 2+  Right patellar: 2+  Left patellar: 2+  Right achilles: 2+  Left achilles: 2+  Right : 2+  Left : 2+          Assessment & Plan  Independent Review of Radiographic Studies:      I personally reviewed the images from the following studies.    I reviewed the MRI done on 6/5/2023 which shows a wide open patent L5-S1 canal.  I do not think there is much progression in the very mild amount of degeneration and lateral recess stenosis at L4-L5.  Agree with the report.       Medical Decision Making:      We will keep the gabapentin at 600 mg nightly.  She will work with Dr. Collier and will talk to him about trying a left L5-S1  transforaminal epidural steroid block.  She will also talk to her PCP about the use of semaglutide for weight loss.  I will see her in 6 months.  As per the above discussion, if we need to start talking about a spinal cord stimulator in the future, we can.    Diagnoses and all orders for this visit:    1. Calf muscle weakness (Primary)    2. DDD (degenerative disc disease), lumbar    3. History of lumbar spinal fusion    4. Sacroiliac joint disease      Return in about 6 months (around 4/16/2024) for face to face.

## 2023-10-16 NOTE — PROGRESS NOTES
Subjective   Patient ID: Oksana Pillai is a 48 y.o. female is here today for follow-up.    She is here with her .  Its been about 2 years since her L5-S1 fusion.  Because of the recurrence of more right-sided sciatica we put her on some gabapentin at 300 mg twice daily.  She could not tolerate the daytime dose so she takes it all at night at 600 mg nightly and it does seem to help.  She continues to work with Dr. Collier and recently he has done a right-sided SI block.  She has had an ablation in the past.  She does not recall having had an epidural block anytime recently.  I told her to speak with him about trying a right L5-S1 transforaminal epidural block.  She and her  wanted to know if the weakness documented below is about as good as it is going to get and I think it probably is at that point since she is about 2 years out from her fusion.  We did not see anything new going on on the latest MRI.  As far as the pain goes they wanted to know what other options there were and I talked about spinal cord stimulation.  We have not had this discussion before.  I think it is probably not quite that where there yet but we might at some point and so we discussed it and I told him to do some research about it.  We will reassess in 6 months.  She is also struggling with her weight and I told her to speak with her PCP about semaglutide's which might help her back if she lost more weight.  I stressed that it would not cure her but it might have a positive impact.    History of Present Illness    The following portions of the patient's history were reviewed and updated as appropriate: allergies, current medications, past family history, past medical history, past social history, past surgical history, and problem list.    Review of Systems   Constitutional:  Negative for fever.   Neurological:  Positive for weakness and numbness.   All other systems reviewed and are negative.          Objective     Vitals:     "10/16/23 1002   BP: 122/74   BP Location: Left arm   Patient Position: Sitting   Cuff Size: Adult   Weight: 107 kg (235 lb)   Height: 170.2 cm (67.01\")   PainSc:   4     Body mass index is 36.8 kg/m².    Tobacco Use: Low Risk  (10/16/2023)    Patient History     Smoking Tobacco Use: Never     Smokeless Tobacco Use: Never     Passive Exposure: Not on file          Physical Exam  Constitutional:       Appearance: She is well-developed.   HENT:      Head: Normocephalic and atraumatic.   Eyes:      Extraocular Movements: EOM normal.      Conjunctiva/sclera: Conjunctivae normal.      Pupils: Pupils are equal, round, and reactive to light.   Neck:      Vascular: No carotid bruit.   Neurological:      Mental Status: She is oriented to person, place, and time.      Coordination: Finger-Nose-Finger Test and Heel to Shin Test normal.      Gait: Gait is intact.      Deep Tendon Reflexes:      Reflex Scores:       Tricep reflexes are 2+ on the right side and 2+ on the left side.       Bicep reflexes are 2+ on the right side and 2+ on the left side.       Brachioradialis reflexes are 2+ on the right side and 2+ on the left side.       Patellar reflexes are 2+ on the right side and 2+ on the left side.       Achilles reflexes are 2+ on the right side and 2+ on the left side.  Psychiatric:         Speech: Speech normal.       Neurologic Exam     Mental Status   Oriented to person, place, and time.   Registration of memory: Good recent and remote memory.   Attention: normal. Concentration: normal.   Speech: speech is normal   Level of consciousness: alert  Knowledge: consistent with education.     Cranial Nerves     CN II   Visual fields full to confrontation.   Visual acuity: normal    CN III, IV, VI   Pupils are equal, round, and reactive to light.  Extraocular motions are normal.     CN V   Facial sensation intact.   Right corneal reflex: normal  Left corneal reflex: normal    CN VII   Facial expression full, symmetric.   Right " facial weakness: none  Left facial weakness: none    CN VIII   Hearing: intact    CN IX, X   Palate: symmetric    CN XI   Right sternocleidomastoid strength: normal  Left sternocleidomastoid strength: normal    CN XII   Tongue: not atrophic  Tongue deviation: none    Motor Exam   Muscle bulk: normal  Right arm tone: normal  Left arm tone: normal  Right leg tone: normal  Left leg tone: normal    Strength   Strength 5/5 except as noted.   Right gastroc: 4/5    Sensory Exam   Light touch normal.     Gait, Coordination, and Reflexes     Gait  Gait: normal    Coordination   Finger to nose coordination: normal  Heel to shin coordination: normal    Reflexes   Right brachioradialis: 2+  Left brachioradialis: 2+  Right biceps: 2+  Left biceps: 2+  Right triceps: 2+  Left triceps: 2+  Right patellar: 2+  Left patellar: 2+  Right achilles: 2+  Left achilles: 2+  Right : 2+  Left : 2+          Assessment & Plan   Independent Review of Radiographic Studies:      I personally reviewed the images from the following studies.    I reviewed the MRI done on 6/5/2023 which shows a wide open patent L5-S1 canal.  I do not think there is much progression in the very mild amount of degeneration and lateral recess stenosis at L4-L5.  Agree with the report.       Medical Decision Making:      We will keep the gabapentin at 600 mg nightly.  She will work with Dr. Collier and will talk to him about trying a left L5-S1 transforaminal epidural steroid block.  She will also talk to her PCP about the use of semaglutide for weight loss.  I will see her in 6 months.  As per the above discussion, if we need to start talking about a spinal cord stimulator in the future, we can.    Diagnoses and all orders for this visit:    1. Calf muscle weakness (Primary)    2. DDD (degenerative disc disease), lumbar    3. History of lumbar spinal fusion    4. Sacroiliac joint disease      Return in about 6 months (around 4/16/2024) for face to face.

## 2024-01-11 DIAGNOSIS — M53.3 SACROILIAC JOINT DISEASE: ICD-10-CM

## 2024-01-11 DIAGNOSIS — Z98.1 HISTORY OF LUMBAR SPINAL FUSION: ICD-10-CM

## 2024-01-11 DIAGNOSIS — M62.81 CALF MUSCLE WEAKNESS: ICD-10-CM

## 2024-01-11 DIAGNOSIS — M51.36 DDD (DEGENERATIVE DISC DISEASE), LUMBAR: ICD-10-CM

## 2024-01-12 RX ORDER — GABAPENTIN 300 MG/1
CAPSULE ORAL
Qty: 60 CAPSULE | Refills: 5 | Status: SHIPPED | OUTPATIENT
Start: 2024-01-12

## 2024-01-12 NOTE — TELEPHONE ENCOUNTER
Rx Refill Note  Requested Prescriptions     Pending Prescriptions Disp Refills    gabapentin (NEURONTIN) 300 MG capsule [Pharmacy Med Name: Gabapentin Oral Capsule 300 MG] 60 capsule 0     Sig: TAKE ONE CAPSULE BY MOUTH EVERY EVENING FOR 7 DAYS THEN TWICE DAILY      Last office visit with prescribing clinician: 10/16/2023   Last telemedicine visit with prescribing clinician: Visit date not found   Next office visit with prescribing clinician: 4/15/2024                         Would you like a call back once the refill request has been completed: [] Yes [] No    If the office needs to give you a call back, can they leave a voicemail: [] Yes [] No    Stevenson Strauss MA  01/12/24, 08:14 EST

## 2024-03-18 RX ORDER — METHYLPREDNISOLONE 4 MG/1
TABLET ORAL
Qty: 21 TABLET | Refills: 0 | Status: SHIPPED | OUTPATIENT
Start: 2024-03-18

## 2024-03-18 NOTE — TELEPHONE ENCOUNTER
Rx Refill Note  Requested Prescriptions     Pending Prescriptions Disp Refills    methylPREDNISolone (MEDROL) 4 MG dose pack 21 tablet 0     Sig: Take as directed on package instructions.      Last office visit with prescribing clinician: 10/16/2023   Last telemedicine visit with prescribing clinician: Visit date not found   Next office visit with prescribing clinician: 4/15/2024                         Would you like a call back once the refill request has been completed: [] Yes [] No    If the office needs to give you a call back, can they leave a voicemail: [] Yes [] No    Stevenson Strauss MA  03/18/24, 14:22 EDT

## 2024-04-10 NOTE — PROGRESS NOTES
Subjective   Patient ID: Oksana Pillai is a 48 y.o. female is here today for follow-up on gabapentin     It has been 6 months since have seen her in close to 3 years since her L5-S1 fusion.  The right L5-S1 transforaminal block was only modestly helpful.  She did have a sacroiliac ablation in the past which did seem to help briefly but apparently her insurance no longer covers a repeat ablation.  The pain in the sacroiliac region does not seem as bad as the pain in the right buttock and into the calf but she still feels that she can tolerate it.  She asked me my opinion about trying to return to the workplace.  She is seeing a vocational rehab facility.  I think her limited physical abilities probably prohibit her from entering the workforce full-time.  Her physical therapist filled out a worksheet which I reviewed and agree with her responses.  I be happy to fill 1 out for her as well and she asked me to do that.  I do not think applying for Social Security/disability in her case is unreasonable.  Other than working remotely and at her own pace part-time, I do not think full-time return to work is likely to be successful or helpful for her.  Regarding the sacroiliac pain I mentioned for sake of completeness the fact that she could consider having a sacroiliac fusion but again she does not feel she is at that point and if the radicular pain that she experiences from time to time right now progresses then a spinal cord stimulator trial might be something to consider.  On that score she does not feel ready for that.  So we will just follow her and see her in 6 months.  She asked about Lyrica which we can substitute for the gabapentin to see if it works any better but she wants to think about that 2 and will let us know.  Another alternative is to have Dr. Collier go over the gabapentin and use longer acting formulations.        History of Present Illness    The following portions of the patient's history were reviewed and  "updated as appropriate: allergies, current medications, past family history, past medical history, past social history, past surgical history, and problem list.    Review of Systems   Constitutional:  Negative for fever.   Musculoskeletal:  Positive for back pain.   Neurological:  Positive for weakness and numbness.   All other systems reviewed and are negative.          Objective     Vitals:    04/15/24 1032   BP: 118/68   BP Location: Left arm   Patient Position: Sitting   Cuff Size: Adult   Resp: 20   Weight: 107 kg (235 lb)   Height: 170.2 cm (67.01\")   PainSc:   4   PainLoc: Back     Body mass index is 36.8 kg/m².    Tobacco Use: Low Risk  (4/15/2024)    Patient History     Smoking Tobacco Use: Never     Smokeless Tobacco Use: Never     Passive Exposure: Not on file          Physical Exam  Eyes:      Extraocular Movements: EOM normal.      Pupils: Pupils are equal, round, and reactive to light.   Neurological:      Mental Status: She is oriented to person, place, and time.      Coordination: Finger-Nose-Finger Test and Heel to Shin Test normal.      Gait: Gait is intact.      Deep Tendon Reflexes:      Reflex Scores:       Tricep reflexes are 2+ on the right side and 2+ on the left side.       Bicep reflexes are 2+ on the right side and 2+ on the left side.       Brachioradialis reflexes are 2+ on the right side and 2+ on the left side.       Patellar reflexes are 2+ on the right side and 2+ on the left side.       Achilles reflexes are 2+ on the right side and 2+ on the left side.  Psychiatric:         Speech: Speech normal.       Neurologic Exam     Mental Status   Oriented to person, place, and time.   Registration of memory: Good recent and remote memory.   Attention: normal. Concentration: normal.   Speech: speech is normal   Level of consciousness: alert  Knowledge: consistent with education.     Cranial Nerves     CN II   Visual fields full to confrontation.   Visual acuity: normal    CN III, IV, VI "   Pupils are equal, round, and reactive to light.  Extraocular motions are normal.     CN V   Facial sensation intact.   Right corneal reflex: normal  Left corneal reflex: normal    CN VII   Facial expression full, symmetric.   Right facial weakness: none  Left facial weakness: none    CN VIII   Hearing: intact    CN IX, X   Palate: symmetric    CN XI   Right sternocleidomastoid strength: normal  Left sternocleidomastoid strength: normal    CN XII   Tongue: not atrophic  Tongue deviation: none    Motor Exam   Muscle bulk: normal  Right arm tone: normal  Left arm tone: normal  Right leg tone: normal  Left leg tone: normal    Strength   Strength 5/5 except as noted.   Right gastroc: 4/5    Sensory Exam   Light touch normal.     Gait, Coordination, and Reflexes     Gait  Gait: normal    Coordination   Finger to nose coordination: normal  Heel to shin coordination: normal    Reflexes   Right brachioradialis: 2+  Left brachioradialis: 2+  Right biceps: 2+  Left biceps: 2+  Right triceps: 2+  Left triceps: 2+  Right patellar: 2+  Left patellar: 2+  Right achilles: 2+  Left achilles: 2+  Right : 2+  Left : 2+          Assessment & Plan   Independent Review of Radiographic Studies:      I personally reviewed the images from the following studies.    I reviewed the MRI done on 6/5/2023 which shows a wide open patent L5-S1 canal.  I do not think there is much progression in the very mild amount of degeneration and lateral recess stenosis at L4-L5.  Agree with the report.     Medical Decision Making:      She will continue working with Dr. Collier.  Will continue the gabapentin at 300 mg nightly.  If she wants to try Lyrica, she will let me know.  If she wants to try a longer acting version of the gabapentin, then she can talk to Dr. Collier about that and I do not have any problems about him prescribing that.  Will see her in 6 months.  If the right buttock or calf pain worsens and it is intolerable, she will let me  know and we will repeat the MRI and if that does not show any major changes then we can consider a spinal cord stimulator trial.  I will fill out the questionnaire from the vocational rehab facility and send it back to her.        Diagnoses and all orders for this visit:    1. Calf muscle weakness (Primary)    2. DDD (degenerative disc disease), lumbar    3. History of lumbar spinal fusion    4. Sacroiliac joint disease      Return in about 6 months (around 10/15/2024) for face to face.

## 2024-04-15 ENCOUNTER — OFFICE VISIT (OUTPATIENT)
Dept: NEUROSURGERY | Facility: CLINIC | Age: 49
End: 2024-04-15
Payer: COMMERCIAL

## 2024-04-15 VITALS
SYSTOLIC BLOOD PRESSURE: 118 MMHG | RESPIRATION RATE: 20 BRPM | HEIGHT: 67 IN | BODY MASS INDEX: 36.88 KG/M2 | DIASTOLIC BLOOD PRESSURE: 68 MMHG | WEIGHT: 235 LBS

## 2024-04-15 DIAGNOSIS — M62.81 CALF MUSCLE WEAKNESS: Primary | ICD-10-CM

## 2024-04-15 DIAGNOSIS — M51.36 DDD (DEGENERATIVE DISC DISEASE), LUMBAR: ICD-10-CM

## 2024-04-15 DIAGNOSIS — Z98.1 HISTORY OF LUMBAR SPINAL FUSION: ICD-10-CM

## 2024-04-15 DIAGNOSIS — M53.3 SACROILIAC JOINT DISEASE: ICD-10-CM

## 2024-04-15 PROCEDURE — 99214 OFFICE O/P EST MOD 30 MIN: CPT | Performed by: NEUROLOGICAL SURGERY

## 2024-10-16 ENCOUNTER — OFFICE VISIT (OUTPATIENT)
Dept: NEUROSURGERY | Facility: CLINIC | Age: 49
End: 2024-10-16
Payer: COMMERCIAL

## 2024-10-16 VITALS
BODY MASS INDEX: 36.88 KG/M2 | DIASTOLIC BLOOD PRESSURE: 80 MMHG | SYSTOLIC BLOOD PRESSURE: 118 MMHG | HEIGHT: 67 IN | WEIGHT: 235 LBS | RESPIRATION RATE: 20 BRPM

## 2024-10-16 DIAGNOSIS — M62.81 CALF MUSCLE WEAKNESS: Primary | ICD-10-CM

## 2024-10-16 DIAGNOSIS — M51.362 DEGENERATION OF INTERVERTEBRAL DISC OF LUMBAR REGION WITH DISCOGENIC BACK PAIN AND LOWER EXTREMITY PAIN: ICD-10-CM

## 2024-10-16 DIAGNOSIS — Z98.1 HISTORY OF LUMBAR SPINAL FUSION: ICD-10-CM

## 2024-10-16 DIAGNOSIS — M53.3 SACROILIAC JOINT DISEASE: ICD-10-CM

## 2024-10-16 PROCEDURE — 99213 OFFICE O/P EST LOW 20 MIN: CPT | Performed by: NEUROLOGICAL SURGERY

## 2024-10-16 RX ORDER — PREGABALIN 75 MG/1
75 CAPSULE ORAL 3 TIMES DAILY
COMMUNITY
Start: 2024-09-26

## 2024-10-16 NOTE — PROGRESS NOTES
Subjective   Patient ID: Oksana Pillai is a 49 y.o. female is here today for follow-up.    It has been about 6 months since have seen her at about 3 years since she underwent her L5-S1 fusion.  She has some current right buttock and leg pain and the right calf weakness that been longstanding from nerve injury.  Her pain physician Dr. Collier switched her completely over to Lyrica so she is off the gabapentin.  She is still having some injections both in the sacroiliac region, epidural blocks and potentially even additional ablations in an attempt to control her pain.  Right now her right leg is doing reasonably well.  A spinal cord stimulator is a backup option if all else fails particularly if she continues to have progressive neuropathic right leg pain as long as we know that there are not any structural problems at L4-L5 above the fusion site.  She is still divided about whether or not she wants to apply to Social Security disability.  She is thinking about talking to  about it.  She has not returned to the workplace.  She still doing her exercises.  I will see her in 6 months with x-rays.  If she wants to switch gears and consider spinal cord stimulation, I am fine with that but we should recheck her MRI.    History of Present Illness    The following portions of the patient's history were reviewed and updated as appropriate: allergies, current medications, past family history, past medical history, past social history, past surgical history, and problem list.    Review of Systems   Constitutional:  Negative for fever.   Musculoskeletal:  Positive for back pain and gait problem.   Neurological:  Positive for weakness and numbness.   All other systems reviewed and are negative.      Objective   Physical Exam  Constitutional:       General: She is awake.      Appearance: She is well-developed.   HENT:      Head: Normocephalic and atraumatic.   Eyes:      General: Lids are normal.      Extraocular Movements:  Extraocular movements intact.      Conjunctiva/sclera: Conjunctivae normal.      Pupils: Pupils are equal, round, and reactive to light.   Neck:      Vascular: No carotid bruit.   Neurological:      Mental Status: She is alert.      Coordination: Coordination is intact.      Deep Tendon Reflexes:      Reflex Scores:       Tricep reflexes are 2+ on the right side and 2+ on the left side.       Bicep reflexes are 2+ on the right side and 2+ on the left side.       Brachioradialis reflexes are 2+ on the right side and 2+ on the left side.       Patellar reflexes are 2+ on the right side and 2+ on the left side.       Achilles reflexes are 2+ on the right side and 2+ on the left side.  Psychiatric:         Speech: Speech normal.       Neurological Exam  Mental Status  Awake and alert. Oriented only to person, place, time and situation. Recent and remote memory are intact. Speech is normal. Language is fluent with no aphasia. Attention and concentration are normal. Fund of knowledge is appropriate for level of education.    Cranial Nerves  CN II: Visual acuity is normal. Visual fields full to confrontation.  CN III, IV, VI: Extraocular movements intact bilaterally. Normal lids and orbits bilaterally. Pupils equal round and reactive to light bilaterally.  CN V: Facial sensation is normal.  CN VII: Full and symmetric facial movement.  CN IX, X: Palate elevates symmetrically. Normal gag reflex.  CN XI: Shoulder shrug strength is normal.  CN XII: Tongue midline without atrophy or fasciculations.    Motor  Normal muscle bulk throughout. Normal muscle tone.                                               Right                     Left  Rhomboids                            5                          5  Infraspinatus                          5                          5  Supraspinatus                       5                          5  Deltoid                                   5                          5   Biceps                                    5                          5  Brachioradialis                      5                          5   Triceps                                  5                          5   Pronator                                5                          5   Supinator                              5                           5   Wrist flexor                            5                          5   Wrist extensor                       5                          5   Finger flexor                          5                          5   Finger extensor                     5                          5   Interossei                              5                          5   Abductor pollicis brevis         5                          5   Flexor pollicis brevis             5                          5   Opponens pollicis                 5                          5  Extensor digitorum               5                          5  Abductor digiti minimi           5                          5   Abdominal                            5                          5  Glutei                                    5                          5  Hip abductor                         5                          5  Hip adductor                         5                          5   Iliopsoas                               5                          5   Quadriceps                           5                          5   Hamstring                             5                          5   Gastrocnemius                     3+                           5   Anterior tibialis                      5                          5   Posterior tibialis                    5                          5   Peroneal                               5                          5  Ankle dorsiflexor                   5                          5  Ankle plantar flexor              5                           5  Extensor hallucis longus      5                            5    Sensory  Light touch is normal in upper and lower extremities. Proprioception is normal in upper and lower extremities.     Reflexes                                            Right                      Left  Brachioradialis                    2+                         2+  Biceps                                 2+                         2+  Triceps                                2+                         2+  Finger flex                           2+                         2+  Hamstring                            2+                         2+  Patellar                                2+                         2+  Achilles                                2+                         2+    Coordination    Finger-to-nose, rapid alternating movements and heel-to-shin normal bilaterally without dysmetria.    Gait  Casual gait is normal including stance, stride, and arm swing.Normal toe walking. Normal heel walking. Normal tandem gait.       Assessment & Plan   Independent Review of Radiographic Studies:      I reviewed the MRI done on 6/5/2023 which shows a wide open patent L5-S1 canal.  I do not think there is much progression in the very mild amount of degeneration and lateral recess stenosis at L4-L5.  Agree with the report.      Medical Decision Making:      She will continue working with Dr. Collier who is doing the injections and prescribing the Lyrica.  I will see her in 6 months with x-rays.  If she wants to move forward with spinal cord stimulation after a successful trial, she will let me know.      Diagnoses and all orders for this visit:    1. Calf muscle weakness (Primary)    2. History of lumbar spinal fusion  -     XR Spine Lumbar Complete With Flex & Ext; Future    3. Sacroiliac joint disease    4. Degeneration of intervertebral disc of lumbar region with discogenic back pain and lower extremity pain      Return in about 6 months (around 4/16/2025) for Face-to-face.

## 2025-04-12 NOTE — PROGRESS NOTES
Subjective   Patient ID: Oksana Pillai is a 49 y.o. female is here today for 6 month follow-up xray.    History of Present Illness    Is been 4 years since her L5-S1 fusion.  Since I saw her, she decided to go back to work and now works in [employer] part-time.  Unfortunately she has not been able to exercise much and has gained weight.  I told her that perhaps she should speak to her PCP about semaglutide's.  I also suggested that she try to get back into a swimming routine for exercise.  She is still getting SI joint injections with Dr. Collier.  Apparently her insurance will not pay for an ablation.  She still does not want to move forward with spinal cord stimulation which is fine at this point.  She takes Lyrica from Dr. Collier.  The x-rays were discussed.  They look fine.  Will continue surveillance visits and see her in 6 months.    The following portions of the patient's history were reviewed and updated as appropriate: allergies, current medications, past family history, past medical history, past social history, past surgical history, and problem list.    Review of Systems   All other systems reviewed and are negative.      Objective   Physical Exam  Constitutional:       General: She is awake.      Appearance: She is well-developed.   HENT:      Head: Normocephalic and atraumatic.   Eyes:      General: Lids are normal.      Extraocular Movements: Extraocular movements intact.      Conjunctiva/sclera: Conjunctivae normal.      Pupils: Pupils are equal, round, and reactive to light.   Neck:      Vascular: No carotid bruit.   Neurological:      Mental Status: She is alert.      Coordination: Coordination is intact.      Deep Tendon Reflexes:      Reflex Scores:       Tricep reflexes are 2+ on the right side and 2+ on the left side.       Bicep reflexes are 2+ on the right side and 2+ on the left side.       Brachioradialis reflexes are 2+ on the right side and 2+ on the left side.       Patellar reflexes  are 2+ on the right side and 2+ on the left side.       Achilles reflexes are 2+ on the right side and 2+ on the left side.  Psychiatric:         Speech: Speech normal.       Neurological Exam  Mental Status  Awake and alert. Oriented only to person, place, time and situation. Recent and remote memory are intact. Speech is normal. Language is fluent with no aphasia. Attention and concentration are normal. Fund of knowledge is appropriate for level of education.    Cranial Nerves  CN II: Visual acuity is normal. Visual fields full to confrontation.  CN III, IV, VI: Extraocular movements intact bilaterally. Normal lids and orbits bilaterally. Pupils equal round and reactive to light bilaterally.  CN V: Facial sensation is normal.  CN VII: Full and symmetric facial movement.  CN IX, X: Palate elevates symmetrically. Normal gag reflex.  CN XI: Shoulder shrug strength is normal.  CN XII: Tongue midline without atrophy or fasciculations.    Motor  Normal muscle bulk throughout. Normal muscle tone.                                               Right                     Left  Rhomboids                            5                          5  Infraspinatus                          5                          5  Supraspinatus                       5                          5  Deltoid                                   5                          5   Biceps                                   5                          5  Brachioradialis                      5                          5   Triceps                                  5                          5   Pronator                                5                          5   Supinator                              5                           5   Wrist flexor                            5                          5   Wrist extensor                       5                          5   Finger flexor                          5                          5   Finger extensor                      5                          5   Interossei                              5                          5   Abductor pollicis brevis         5                          5   Flexor pollicis brevis             5                          5   Opponens pollicis                 5                          5  Extensor digitorum               5                          5  Abductor digiti minimi           5                          5   Abdominal                            5                          5  Glutei                                    5                          5  Hip abductor                         5                          5  Hip adductor                         5                          5   Iliopsoas                               5                          5   Quadriceps                           5                          5   Hamstring                             5                          5   Gastrocnemius                     4                           5   Anterior tibialis                      5                          5   Posterior tibialis                    5                          5   Peroneal                               5                          5  Ankle dorsiflexor                   5                          5  Ankle plantar flexor              5                           5  Extensor hallucis longus      5                           5    Sensory  Light touch is normal in upper and lower extremities. Proprioception is normal in upper and lower extremities.     Reflexes                                            Right                      Left  Brachioradialis                    2+                         2+  Biceps                                 2+                         2+  Triceps                                2+                         2+  Finger flex                           2+                         2+  Hamstring                            2+                         2+  Patellar                                 2+                         2+  Achilles                                2+                         2+    Coordination    Finger-to-nose, rapid alternating movements and heel-to-shin normal bilaterally without dysmetria.    Gait  Casual gait is normal including stance, stride, and arm swing.Normal toe walking. Normal heel walking. Normal tandem gait.       Assessment & Plan   Independent Review of Radiographic Studies:      The x-rays done on/16/25 show good positioning of the interbody cage at L5-S1 as well as the pedicle screws and rods.  No breakage or malpositioning.    Medical Decision Making:      Generally doing better.  She will consider doing some swimming exercise and semaglutide's which will be discussed with her PCP.  Will see if things worsen spinal cord stimulation.    Diagnoses and all orders for this visit:    1. Calf muscle weakness (Primary)    2. History of lumbar spinal fusion    3. Sacroiliac joint disease    4. Degeneration of intervertebral disc of lumbar region with discogenic back pain and lower extremity pain    5. Postlaminectomy syndrome, lumbar region      Return in about 6 months (around 10/16/2025) for face to face.

## 2025-04-16 ENCOUNTER — OFFICE VISIT (OUTPATIENT)
Dept: NEUROSURGERY | Facility: CLINIC | Age: 50
End: 2025-04-16
Payer: COMMERCIAL

## 2025-04-16 VITALS — HEART RATE: 59 BPM | OXYGEN SATURATION: 94 % | SYSTOLIC BLOOD PRESSURE: 124 MMHG | DIASTOLIC BLOOD PRESSURE: 80 MMHG

## 2025-04-16 DIAGNOSIS — M62.81 CALF MUSCLE WEAKNESS: Primary | ICD-10-CM

## 2025-04-16 DIAGNOSIS — Z98.1 HISTORY OF LUMBAR SPINAL FUSION: ICD-10-CM

## 2025-04-16 DIAGNOSIS — M53.3 SACROILIAC JOINT DISEASE: ICD-10-CM

## 2025-04-16 DIAGNOSIS — M96.1 POSTLAMINECTOMY SYNDROME, LUMBAR REGION: ICD-10-CM

## 2025-04-16 DIAGNOSIS — M51.362 DEGENERATION OF INTERVERTEBRAL DISC OF LUMBAR REGION WITH DISCOGENIC BACK PAIN AND LOWER EXTREMITY PAIN: ICD-10-CM

## 2025-04-16 PROCEDURE — 99213 OFFICE O/P EST LOW 20 MIN: CPT | Performed by: NEUROLOGICAL SURGERY

## 2025-05-28 ENCOUNTER — APPOINTMENT (OUTPATIENT)
Dept: GENERAL RADIOLOGY | Facility: HOSPITAL | Age: 50
End: 2025-05-28
Payer: COMMERCIAL

## 2025-05-28 ENCOUNTER — HOSPITAL ENCOUNTER (EMERGENCY)
Facility: HOSPITAL | Age: 50
Discharge: HOME | End: 2025-05-28
Attending: EMERGENCY MEDICINE | Admitting: EMERGENCY MEDICINE
Payer: COMMERCIAL

## 2025-05-28 VITALS
OXYGEN SATURATION: 94 % | RESPIRATION RATE: 18 BRPM | SYSTOLIC BLOOD PRESSURE: 133 MMHG | DIASTOLIC BLOOD PRESSURE: 74 MMHG | HEIGHT: 67 IN | WEIGHT: 240 LBS | BODY MASS INDEX: 37.67 KG/M2 | TEMPERATURE: 97.3 F | HEART RATE: 56 BPM

## 2025-05-28 DIAGNOSIS — R07.9 CHEST PAIN, UNSPECIFIED TYPE: Primary | ICD-10-CM

## 2025-05-28 DIAGNOSIS — M79.602 LEFT ARM PAIN: ICD-10-CM

## 2025-05-28 LAB
ALBUMIN SERPL-MCNC: 4.4 G/DL (ref 3.5–5.2)
ALBUMIN/GLOB SERPL: 1.5 G/DL
ALP SERPL-CCNC: 119 U/L (ref 39–117)
ALT SERPL W P-5'-P-CCNC: 35 U/L (ref 1–33)
ANION GAP SERPL CALCULATED.3IONS-SCNC: 9.2 MMOL/L (ref 5–15)
AST SERPL-CCNC: 32 U/L (ref 1–32)
BASOPHILS # BLD AUTO: 0.02 10*3/MM3 (ref 0–0.2)
BASOPHILS NFR BLD AUTO: 0.3 % (ref 0–1.5)
BILIRUB SERPL-MCNC: 0.3 MG/DL (ref 0–1.2)
BUN SERPL-MCNC: 15 MG/DL (ref 6–20)
BUN/CREAT SERPL: 16.3 (ref 7–25)
CALCIUM SPEC-SCNC: 9.4 MG/DL (ref 8.6–10.5)
CHLORIDE SERPL-SCNC: 105 MMOL/L (ref 98–107)
CO2 SERPL-SCNC: 24.8 MMOL/L (ref 22–29)
CREAT SERPL-MCNC: 0.92 MG/DL (ref 0.57–1)
DEPRECATED RDW RBC AUTO: 42.1 FL (ref 37–54)
EGFRCR SERPLBLD CKD-EPI 2021: 77.4 ML/MIN/1.73
EOSINOPHIL # BLD AUTO: 0.12 10*3/MM3 (ref 0–0.4)
EOSINOPHIL NFR BLD AUTO: 1.8 % (ref 0.3–6.2)
ERYTHROCYTE [DISTWIDTH] IN BLOOD BY AUTOMATED COUNT: 12.1 % (ref 12.3–15.4)
GEN 5 1HR TROPONIN T REFLEX: 8 NG/L
GLOBULIN UR ELPH-MCNC: 3 GM/DL
GLUCOSE SERPL-MCNC: 91 MG/DL (ref 65–99)
HCT VFR BLD AUTO: 42.1 % (ref 34–46.6)
HGB BLD-MCNC: 14.3 G/DL (ref 12–15.9)
HOLD SPECIMEN: NORMAL
HOLD SPECIMEN: NORMAL
IMM GRANULOCYTES # BLD AUTO: 0.01 10*3/MM3 (ref 0–0.05)
IMM GRANULOCYTES NFR BLD AUTO: 0.2 % (ref 0–0.5)
LYMPHOCYTES # BLD AUTO: 2.8 10*3/MM3 (ref 0.7–3.1)
LYMPHOCYTES NFR BLD AUTO: 42 % (ref 19.6–45.3)
MCH RBC QN AUTO: 32.4 PG (ref 26.6–33)
MCHC RBC AUTO-ENTMCNC: 34 G/DL (ref 31.5–35.7)
MCV RBC AUTO: 95.2 FL (ref 79–97)
MONOCYTES # BLD AUTO: 0.48 10*3/MM3 (ref 0.1–0.9)
MONOCYTES NFR BLD AUTO: 7.2 % (ref 5–12)
NEUTROPHILS NFR BLD AUTO: 3.23 10*3/MM3 (ref 1.7–7)
NEUTROPHILS NFR BLD AUTO: 48.5 % (ref 42.7–76)
NRBC BLD AUTO-RTO: 0 /100 WBC (ref 0–0.2)
PLATELET # BLD AUTO: 193 10*3/MM3 (ref 140–450)
PMV BLD AUTO: 10.9 FL (ref 6–12)
POTASSIUM SERPL-SCNC: 4.1 MMOL/L (ref 3.5–5.2)
PROT SERPL-MCNC: 7.4 G/DL (ref 6–8.5)
QT INTERVAL: 437 MS
QTC INTERVAL: 429 MS
RBC # BLD AUTO: 4.42 10*6/MM3 (ref 3.77–5.28)
SODIUM SERPL-SCNC: 139 MMOL/L (ref 136–145)
TROPONIN T NUMERIC DELTA: 1 NG/L
TROPONIN T SERPL HS-MCNC: 7 NG/L
WBC NRBC COR # BLD AUTO: 6.66 10*3/MM3 (ref 3.4–10.8)
WHOLE BLOOD HOLD COAG: NORMAL
WHOLE BLOOD HOLD SPECIMEN: NORMAL

## 2025-05-28 PROCEDURE — 99284 EMERGENCY DEPT VISIT MOD MDM: CPT

## 2025-05-28 PROCEDURE — 84484 ASSAY OF TROPONIN QUANT: CPT

## 2025-05-28 PROCEDURE — 85025 COMPLETE CBC W/AUTO DIFF WBC: CPT

## 2025-05-28 PROCEDURE — 93005 ELECTROCARDIOGRAM TRACING: CPT | Performed by: EMERGENCY MEDICINE

## 2025-05-28 PROCEDURE — 36415 COLL VENOUS BLD VENIPUNCTURE: CPT

## 2025-05-28 PROCEDURE — 80053 COMPREHEN METABOLIC PANEL: CPT

## 2025-05-28 PROCEDURE — 93010 ELECTROCARDIOGRAM REPORT: CPT | Performed by: INTERNAL MEDICINE

## 2025-05-28 PROCEDURE — 84484 ASSAY OF TROPONIN QUANT: CPT | Performed by: EMERGENCY MEDICINE

## 2025-05-28 PROCEDURE — 71045 X-RAY EXAM CHEST 1 VIEW: CPT

## 2025-05-28 PROCEDURE — 93005 ELECTROCARDIOGRAM TRACING: CPT

## 2025-05-28 RX ORDER — ASPIRIN 325 MG
325 TABLET ORAL ONCE
Status: DISCONTINUED | OUTPATIENT
Start: 2025-05-28 | End: 2025-05-29 | Stop reason: HOSPADM

## 2025-05-28 RX ORDER — SODIUM CHLORIDE 0.9 % (FLUSH) 0.9 %
10 SYRINGE (ML) INJECTION AS NEEDED
Status: DISCONTINUED | OUTPATIENT
Start: 2025-05-28 | End: 2025-05-29 | Stop reason: HOSPADM

## 2025-05-28 RX ORDER — METHYLPREDNISOLONE 4 MG/1
TABLET ORAL
Qty: 1 EACH | Refills: 0 | Status: SHIPPED | OUTPATIENT
Start: 2025-05-28

## 2025-05-28 NOTE — Clinical Note
[facility] EMERGENCY DEPARTMENT  [address] [facility] [address]  Phone: [phone]    Oksana Pillai was seen and treated in our emergency department on 5/28/2025.  She may return to work on 05/30/2025.         Thank you for choosing [facility].    Samaria Jarrett PA-C

## 2025-05-29 NOTE — DISCHARGE INSTRUCTIONS
Follow-up with your primary care, schedule an appointment with cardiology.    Emergency Department for shortness of breath, any new or worsening symptoms, passing out, exertional symptoms, any concerns

## 2025-05-29 NOTE — ED PROVIDER NOTES
MD ATTESTATION NOTE      Brief HPI: Patient complains of acute left upper chest pain that began around 5:30 PM today while she was sitting at her desk..  Pain is sharp and intermittent.  Pain occasionally radiates into the left upper arm.  Pain is not related to exertion.  Denies shortness of breath, nausea, vomiting, or sweating.    PHYSICAL EXAM    GENERAL: Awake, alert, and oriented x 3.  Resting comfortably in no acute distress  HENT: nares patent  EYES: no scleral icterus  CV: regular rhythm, normal rate, left radial pulse  RESPIRATORY: normal effort, CTAB  ABDOMEN: soft, nontender  MUSCULOSKELETAL: no deformity, extremities are nontender, chest is nontender, no calf tenderness  NEURO: moves all extremities, follows commands, speech is clear and fluent, no facial droop  PSYCH:  calm, cooperative  SKIN: warm, dry    Vital signs and nursing notes reviewed.        Plan: Patient complains of acute chest pain.  Her symptoms are atypical.  Risk factors for CAD include a BMI of 37.6 and possible family history of premature CAD.  Will obtain chest x-ray, EKG, and labs for further evaluation.  Differential diagnosis includes but is not limited to: Acute coronary syndrome, pleurisy, pneumonia, pleural effusion, PE    Chest x-ray personally interpreted by me.  My personal interpretation is: Heart size is normal.  Lungs are clear.  No pleural effusion.    EKG interpreted by me.  My personal interpretation is:         EKG time: 1858  Rhythm/Rate: Sinus bradycardia, rate 58  P waves and AR: Normal  QRS, axis: Normal axis  ST and T waves: Nonspecific T wave changes in the anterior leads    Interpreted Contemporaneously by me, independently viewed  No prior available for comparison     ED Course as of 05/28/25 2245   Wed May 28, 2025   2041 WBC: 6.66 [KA]   2041 Hemoglobin: 14.3 [KA]   2041 Glucose: 91 [KA]   2041 Creatinine: 0.92 [KA]   2041 HS Troponin T: 7 [KA]   2042 My independent Interpretation of the chest x-ray is no  cardiomegaly or acute infiltrate. [KA]   2043 My independent interpretation of the EKG performed at 1858 is sinus rhythm rate 58 normal axis normal intervals no ST elevation.  In comparison to prior on 8/21/2018 I do not see any significant change. [KA]   2115 HS Troponin T: 8 [KA]   2115 Troponin T Numeric Delta: 1 [KA]      ED Course User Index  [KA] Samaria Jarrett PA-C Holland, William D, MD  05/28/25 2595

## 2025-05-29 NOTE — ED PROVIDER NOTES
EMERGENCY DEPARTMENT ENCOUNTER  Room Number:  05/05  PCP: Olinda Winters MD  Independent Historians: Patient      HPI:  Chief Complaint: had concerns including Chest Pain.     A complete HPI/ROS/PMH/PSH/SH/FH are unobtainable due to: None    Chronic or social conditions impacting patient care (Social Determinants of Health): None      Context: The patient is a 49 y.o. female with a medical history of chronic back pain, sciatica, anxiety and depression who presents to the ED c/o acute upper chest pain that started at 5:30 PM while sitting at her desk this afternoon.  She states it comes and goes, lasts a few seconds at a time and feels like a squeezing sensation and aching in her upper arm.  Nothing provokes or relieves it, it is not exertional or pleuritic.  It has happened a couple more times over the course of the last couple hours.  She denies any shortness of breath vomiting diaphoresis or syncope, or extremity edema.  No history of hypertension hyperlipidemia and diabetes, non-smoker.  She is adopted but believes her mother did have heart disease.      Review of prior external notes (non-ED) -and- Review of prior external test results outside of this encounter:  Labs performed 2/15/2024 and hemoglobin 13.8, white blood cell count 5.86, platelets 201        PAST MEDICAL HISTORY  Active Ambulatory Problems     Diagnosis Date Noted    Calf muscle weakness 04/05/2017    Surgical follow-up care 04/21/2017    Intervertebral disc disorder with radiculopathy of lumbosacral region 08/21/2017    Chronic bilateral low back pain with right-sided sciatica 02/05/2018    DDD (degenerative disc disease), lumbar 02/05/2018    Allergic rhinitis 02/19/2018    Anxiety and depression 02/19/2018    Suspected sleep apnea 02/19/2018    Obesity (BMI 30-39.9) 05/04/2018    TMJ (temporomandibular joint disorder) 05/04/2018    Hypersomnia with sleep apnea 05/04/2018    Sleep-related bruxism 05/04/2018    PLMD (periodic limb movement  disorder) 2018    History of spinal surgery 2018    Lumbar spondylolysis 12/10/2018    Chronic pain syndrome 2019    Degeneration of intervertebral disc at C5-C6 level 2021    Spinal stenosis of lumbar region with neurogenic claudication 2021    Herniation of lumbar intervertebral disc with radiculopathy 2021    Herniated lumbar intervertebral disc 2021    History of lumbar spinal fusion 2021    Sacroiliac joint disease 2022    Postlaminectomy syndrome, lumbar region 2025     Resolved Ambulatory Problems     Diagnosis Date Noted    Spontaneous  2016    Intervertebral disc disorder with radiculopathy of lumbosacral region 2017    History of colonoscopy with polypectomy 2018    NSAID long-term use 2018     Past Medical History:   Diagnosis Date    Back pain     Cervical herniated disc     COVID-19 vaccine series completed     Eczema     GI bleed     Keratosis pilaris     Lumbar herniated disc     Numbness     Sciatica     TMJ pain dysfunction syndrome          PAST SURGICAL HISTORY  Past Surgical History:   Procedure Laterality Date    CHOLECYSTECTOMY      COLONOSCOPY  2018    LUMBAR DISCECTOMY FUSION INSTRUMENTATION Right 2017    Procedure: Right L5/S1 Metrx Microdiscectomy;  Surgeon: Brian Ojeda MD;  Location: [facility] OR;  Service:     LUMBAR FUSION Right 2021    Procedure: Open lumbar decompression/discectomy lumbar five/sacral one with posterior lumbar interbody fusion with cages and transcortical screw/pete fixation using the Exopriseor robot;  Surgeon: Brian Ojeda MD;  Location: [facility] MAIN OR;  Service: Robotics - Neuro;  Laterality: Right;    TONSILLECTOMY           FAMILY HISTORY  Family History   Adopted: Yes         SOCIAL HISTORY  Social History     Socioeconomic History    Marital status:    Tobacco Use    Smoking status: Never    Smokeless tobacco: Never   Vaping Use    Vaping  status: Never Used   Substance and Sexual Activity    Alcohol use: Yes     Comment: SOCIAL    Drug use: No    Sexual activity: Defer     Partners: Male         ALLERGIES  Venlafaxine      REVIEW OF SYSTEMS  Review of Systems  Included in HPI  All systems reviewed and negative except for those discussed in HPI.      PHYSICAL EXAM    I have reviewed the triage vital signs and nursing notes.    ED Triage Vitals   Temp Heart Rate Resp BP SpO2   05/28/25 1848 05/28/25 1848 05/28/25 1848 05/28/25 1913 05/28/25 1848   97.3 °F (36.3 °C) 63 18 130/100 97 %      Temp src Heart Rate Source Patient Position BP Location FiO2 (%)   -- -- -- -- --              Physical Exam  GENERAL: alert, no acute distress  SKIN: Warm, dry  HENT: Normocephalic, atraumatic  EYES: no scleral icterus  CV: regular rhythm, regular rate, no lower extremity edema, no calf tenderness, negative Homans' sign bilaterally  RESPIRATORY: normal effort, lungs clear  ABDOMEN: nondistended nontender no guarding or rigidity  MUSCULOSKELETAL: no deformity.  No tenderness to the left upper chest wall or shoulder.  Full range of motion of the shoulder without pain  NEURO: alert, moves all extremities, follows commands            LAB RESULTS  Recent Results (from the past 24 hours)   ECG 12 Lead ED Triage Standing Order; Chest Pain    Collection Time: 05/28/25  6:58 PM   Result Value Ref Range    QT Interval 437 ms    QTC Interval 429 ms   Comprehensive Metabolic Panel    Collection Time: 05/28/25  6:59 PM    Specimen: Arm, Right; Blood   Result Value Ref Range    Glucose 91 65 - 99 mg/dL    BUN 15.0 6.0 - 20.0 mg/dL    Creatinine 0.92 0.57 - 1.00 mg/dL    Sodium 139 136 - 145 mmol/L    Potassium 4.1 3.5 - 5.2 mmol/L    Chloride 105 98 - 107 mmol/L    CO2 24.8 22.0 - 29.0 mmol/L    Calcium 9.4 8.6 - 10.5 mg/dL    Total Protein 7.4 6.0 - 8.5 g/dL    Albumin 4.4 3.5 - 5.2 g/dL    ALT (SGPT) 35 (H) 1 - 33 U/L    AST (SGOT) 32 1 - 32 U/L    Alkaline Phosphatase 119 (H)  39 - 117 U/L    Total Bilirubin 0.3 0.0 - 1.2 mg/dL    Globulin 3.0 gm/dL    A/G Ratio 1.5 g/dL    BUN/Creatinine Ratio 16.3 7.0 - 25.0    Anion Gap 9.2 5.0 - 15.0 mmol/L    eGFR 77.4 >60.0 mL/min/1.73   High Sensitivity Troponin T    Collection Time: 05/28/25  6:59 PM    Specimen: Arm, Right; Blood   Result Value Ref Range    HS Troponin T 7 <14 ng/L   Green Top (Gel)    Collection Time: 05/28/25  6:59 PM   Result Value Ref Range    Extra Tube Hold for add-ons.    Lavender Top    Collection Time: 05/28/25  6:59 PM   Result Value Ref Range    Extra Tube hold for add-on    Gold Top - SST    Collection Time: 05/28/25  6:59 PM   Result Value Ref Range    Extra Tube Hold for add-ons.    Light Blue Top    Collection Time: 05/28/25  6:59 PM   Result Value Ref Range    Extra Tube Hold for add-ons.    CBC Auto Differential    Collection Time: 05/28/25  6:59 PM    Specimen: Arm, Right; Blood   Result Value Ref Range    WBC 6.66 3.40 - 10.80 10*3/mm3    RBC 4.42 3.77 - 5.28 10*6/mm3    Hemoglobin 14.3 12.0 - 15.9 g/dL    Hematocrit 42.1 34.0 - 46.6 %    MCV 95.2 79.0 - 97.0 fL    MCH 32.4 26.6 - 33.0 pg    MCHC 34.0 31.5 - 35.7 g/dL    RDW 12.1 (L) 12.3 - 15.4 %    RDW-SD 42.1 37.0 - 54.0 fl    MPV 10.9 6.0 - 12.0 fL    Platelets 193 140 - 450 10*3/mm3    Neutrophil % 48.5 42.7 - 76.0 %    Lymphocyte % 42.0 19.6 - 45.3 %    Monocyte % 7.2 5.0 - 12.0 %    Eosinophil % 1.8 0.3 - 6.2 %    Basophil % 0.3 0.0 - 1.5 %    Immature Grans % 0.2 0.0 - 0.5 %    Neutrophils, Absolute 3.23 1.70 - 7.00 10*3/mm3    Lymphocytes, Absolute 2.80 0.70 - 3.10 10*3/mm3    Monocytes, Absolute 0.48 0.10 - 0.90 10*3/mm3    Eosinophils, Absolute 0.12 0.00 - 0.40 10*3/mm3    Basophils, Absolute 0.02 0.00 - 0.20 10*3/mm3    Immature Grans, Absolute 0.01 0.00 - 0.05 10*3/mm3    nRBC 0.0 0.0 - 0.2 /100 WBC   High Sensitivity Troponin T 1Hr    Collection Time: 05/28/25  8:44 PM    Specimen: Arm, Left; Blood   Result Value Ref Range    HS Troponin T 8 <14  ng/L    Troponin T Numeric Delta 1 Abnormal if >/=3 ng/L         RADIOLOGY  XR Chest 1 View  Result Date: 5/28/2025  XR CHEST 1 VW-  HISTORY: Female who is 49 years-old, chest pain  TECHNIQUE: Frontal view of the chest  COMPARISON: None available  FINDINGS: Heart, mediastinum and pulmonary vasculature are unremarkable. Minimal right basilar atelectasis or infiltrate, follow-up opacifications persist. No pleural effusion, or pneumothorax. No acute osseous process.      As described.  This report was finalized on 5/28/2025 7:58 PM by Dr. Joce Julio M.D on Workstation: [id number]          MEDICATIONS GIVEN IN ER  Medications   sodium chloride 0.9 % flush 10 mL (has no administration in time range)   aspirin tablet 325 mg (325 mg Oral Not Given 5/28/25 2045)         ORDERS PLACED DURING THIS VISIT:  Orders Placed This Encounter   Procedures    XR Chest 1 View    [facility] [place] Draw    Comprehensive Metabolic Panel    High Sensitivity Troponin T    CBC Auto Differential    High Sensitivity Troponin T 1Hr    NPO Diet NPO Type: Strict NPO    Undress & Gown    Continuous Pulse Oximetry    Oxygen Therapy- Nasal Cannula; Titrate 1-6 LPM Per SpO2; 90 - 95%    ECG 12 Lead ED Triage Standing Order; Chest Pain    ECG 12 Lead ED Triage Standing Order; Chest Pain    Insert Peripheral IV    CBC & Differential    Green Top (Gel)    Lavender Top    Gold Top - SST    Light Blue Top         OUTPATIENT MEDICATION MANAGEMENT:  Current Facility-Administered Medications Ordered in Epic   Medication Dose Route Frequency Provider Last Rate Last Admin    aspirin tablet 325 mg  325 mg Oral Once Jatinder Reyes MD        sodium chloride 0.9 % flush 10 mL  10 mL Intravenous PRN Jatinder Reyes MD         Current Outpatient Medications Ordered in Epic   Medication Sig Dispense Refill    Calcium Carbonate-Vit D-Min (CALCIUM 1200 PO) Take 1 tablet by mouth Daily.      Cholecalciferol (VITAMIN D3 PO) Take 2 tablets by mouth Daily.       fexofenadine (ALLEGRA) 60 MG tablet Take 1 tablet by mouth Daily.      levonorgestrel (MIRENA) 20 MCG/24HR IUD 1 each by Intrauterine route 1 (One) Time.      MAGNESIUM PO Take 1 tablet by mouth Daily.      methocarbamol (ROBAXIN) 500 MG tablet Take 2 tablets by mouth Every 6 (Six) Hours. 40 tablet 1    methylPREDNISolone (MEDROL) 4 MG dose pack Take as directed on package instructions. 1 each 0    metroNIDAZOLE (METROGEL) 0.75 % gel APPLY 1 GRAM TOPICALLY TO THE SKIN 2 TIMES A DAY      multivitamin (THERAGRAN) tablet tablet Take 1 tablet by mouth Daily.      Nurtec 75 MG dispersible tablet Take 1 tablet by mouth Daily As Needed.      Omega-3 Fatty Acids (fish oil) 1000 MG capsule capsule Take 1 capsule by mouth Daily With Breakfast. PT HOLDING FOR SURGERY      POTASSIUM GLUCONATE PO Take 1 tablet by mouth Daily.      pregabalin (LYRICA) 75 MG capsule Take 1 capsule by mouth 3 (Three) Times a Day.      sertraline (ZOLOFT) 100 MG tablet Take 2 tablets by mouth Daily.      vitamin E 400 UNIT capsule Take 1 capsule by mouth Daily. PT HOLDING FOR SURGERY      Xhance 93 MCG/ACT Exhaler Suspension 2 sprays into the nostril(s) as directed by provider 2 (two) times a day.           PROCEDURES  Procedures            PROGRESS, DATA ANALYSIS, CONSULTS, AND MEDICAL DECISION MAKING  All labs have been independently interpreted by me.  All radiology studies have been reviewed by me. All EKG's have been independently viewed and interpreted by me.  Discussion below represents my analysis of pertinent findings related to patient's condition, differential diagnosis, treatment plan and final disposition.    DIFFERENTIAL    DDx includes but is not limited to acute coronary syndrome, pulmonary embolism, thoracic aortic dissection, pneumonia, pneumothorax, musculoskeletal pain, GERD or esophageal spasm, anxiety, myocarditis/pericarditis, esophageal rupture, pancreatitis.         Clinical Scores:                  ED Course as of 05/28/25  2314   Wed May 28, 2025   2041 WBC: 6.66 [KA]   2041 Hemoglobin: 14.3 [KA]   2041 Glucose: 91 [KA]   2041 Creatinine: 0.92 [KA]   2041 HS Troponin T: 7 [KA]   2042 My independent Interpretation of the chest x-ray is no cardiomegaly or acute infiltrate. [KA]   2043 My independent interpretation of the EKG performed at 1858 is sinus rhythm rate 58 normal axis normal intervals no ST elevation.  In comparison to prior on 8/21/2018 I do not see any significant change. [KA]   2115 HS Troponin T: 8 [KA]   2115 Troponin T Numeric Delta: 1 [KA]      ED Course User Index  [KA] Samaria Jarrett PA-C       I reassessed the patient, counseled her on all of her lab and imaging results.  Chest x-ray unremarkable, troponins negative, EKG nonischemic.  Symptoms not suggestive of ACS, heart score 2, she stable for outpatient follow-up.  She expresses concerns that this could be related to her known cervical disc disease, has a history of cervical radiculopathy, is now reporting the pain in her left upper arm is sharp and feels  similar to the type of pain when she has sciatica.  encouraged her to follow-up with her neurosurgery team as well as with cardiology for an outpatient evaluation.  She verbalized understanding and is agreeable with this plan.  I think is reasonable to trial her on a Medrol Dosepak and have prescribed that for her.      BP - 133/74  HR - 56  TEMP - 97.3 °F (36.3 °C)  O2 SATS - 94%    COMPLEXITY OF CARE  Admission was considered but after careful review of the patient's presentation, physical examination, diagnostic results, and response to treatment the patient may be safely discharged with outpatient follow-up.      DIAGNOSIS  Final diagnoses:   Chest pain, unspecified type   Left arm pain         DISPOSITION  ED Disposition       ED Disposition   Discharge    Condition   Stable    Comment   --                  FOLLOW UP  Olinda Winters MD  [address]  [address]  [phone]    Schedule an appointment as soon as possible for a visit in 2 days      [facility] MEDICAL [facility] CARDIOLOGY  [address]  [facility] [address] [phone]  Schedule an appointment as soon as possible for a visit       Brian Ojeda MD  [address] [facility]  [address]  [facility] [address]  [phone]              Prescribed Medications     Medication List        New Prescriptions      methylPREDNISolone 4 MG dose pack  Commonly known as: MEDROL  Take as directed on package instructions.               Where to Get Your Medications        These medications were sent to [id number] - [place], [place] - [address] AT [facility] [address] - [phone] PH - [phone]   [address], [place] [address]      Phone: [phone]   methylPREDNISolone 4 MG dose pack                   Please note that portions of this document were completed with a voice recognition program.    Note Disclaimer: At [facility], we believe that sharing information builds trust and better relationships. You are receiving this note because you recently visited [facility]. It is possible you will see health information before a provider has talked with you about it. This kind of information can be easy to misunderstand. To help you fully understand what it means for your health, we urge you to discuss this note with your provider.         Samaria Jarrett PA-C  05/28/25 [id number]

## (undated) DEVICE — DRN JP FLT NO TROC SIL FUL/PERF 7MM

## (undated) DEVICE — DRAPE,REIN 53X77,STERILE: Brand: MEDLINE

## (undated) DEVICE — 3M™ IOBAN™ 2 ANTIMICROBIAL INCISE DRAPE 6650EZ: Brand: IOBAN™ 2

## (undated) DEVICE — DRP C/ARM 41X74IN

## (undated) DEVICE — IMPLANTABLE DEVICE
Type: IMPLANTABLE DEVICE | Site: SPINE LUMBAR | Status: NON-FUNCTIONAL
Removed: 2021-08-20

## (undated) DEVICE — GLV SURG BIOGEL LTX PF 7

## (undated) DEVICE — DISPOSABLE BIPOLAR FORCEPS 7 3/4" (19.7CM) SCOVILLE BAYONET, 1.5MM TIP AND 12 FT. (3.6M) CABLE: Brand: KIRWAN

## (undated) DEVICE — DRP MICROSCP LEICA W/GLASS LENS

## (undated) DEVICE — MARKR SKIN W/RULR AND LBL

## (undated) DEVICE — GOWN,SLEEVE,STERILE,W/CSR WRAP,1/P: Brand: MEDLINE

## (undated) DEVICE — PAD,NON-ADHERENT,2X3,STERILE,LF,1/PK: Brand: MEDLINE

## (undated) DEVICE — NDL SPINE 22G 31/2IN BLK

## (undated) DEVICE — GAUZE,SPONGE,4"X4",16PLY,XRAY,STRL,LF: Brand: MEDLINE

## (undated) DEVICE — GOWN,NON-REINFORCED,SIRUS,SET IN SLV,XXL: Brand: MEDLINE

## (undated) DEVICE — JACKSON-PRATT 100CC BULB RESERVOIR: Brand: CARDINAL HEALTH

## (undated) DEVICE — ENCORE® LATEX ORTHO SIZE 8, STERILE LATEX POWDER-FREE SURGICAL GLOVE: Brand: ENCORE

## (undated) DEVICE — KT SPINE MAZORX DISP

## (undated) DEVICE — APPL CHLORAPREP W/TINT 26ML ORNG

## (undated) DEVICE — ANTIBACTERIAL UNDYED BRAIDED (POLYGLACTIN 910), SYNTHETIC ABSORBABLE SUTURE: Brand: COATED VICRYL

## (undated) DEVICE — DRP STRL STERILEZ ZFLD

## (undated) DEVICE — ELECTRD BLD EXT EDGE 1P COAT 6.5IN STRL

## (undated) DEVICE — SPHR MARKR STEALTH STATION

## (undated) DEVICE — BNDG ADHS CURAD FLX/FABRC 2X4IN STRL LF

## (undated) DEVICE — PENCL ES MEGADINE EZ/CLEAN BUTN W/HOLSTR 10FT

## (undated) DEVICE — SMOKE EVACUATION TUBING WITH 7/8 IN TO 1/4 IN REDUCER: Brand: BUFFALO FILTER

## (undated) DEVICE — TOTAL TRAY, 16FR 10ML SIL FOLEY, URN: Brand: MEDLINE

## (undated) DEVICE — PATIENT RETURN ELECTRODE, SINGLE-USE, CONTACT QUALITY MONITORING, ADULT, WITH 9FT CORD, FOR PATIENTS WEIGING OVER 33LBS. (15KG): Brand: MEGADYNE

## (undated) DEVICE — PK NEURO SPINE 40

## (undated) DEVICE — SYR CONTRL LUERLOK 10CC

## (undated) DEVICE — DRSNG TELFA PAD NONADH STR 1S 3X4IN

## (undated) DEVICE — SUT VIC 4/0 P3 18IN J494G

## (undated) DEVICE — TOWEL,OR,DSP,ST,BLUE,STD,4/PK,20PK/CS: Brand: MEDLINE

## (undated) DEVICE — GLV SURG PREMIERPRO ORTHO LTX PF SZ8 BRN

## (undated) DEVICE — TOOL MR8-31TD3030 MR8 TWST DRIL 3MMX30MM: Brand: MIDAS REX

## (undated) DEVICE — 3M™ STERI-STRIP™ ANTIMICROBIAL SKIN CLOSURES 1/2 INCH X 4 INCHES 50/CARTON 4 CARTONS/CASE A1847: Brand: 3M™ STERI-STRIP™

## (undated) DEVICE — NEEDLE, QUINCKE 22GX3.5": Brand: MEDLINE INDUSTRIES, INC.

## (undated) DEVICE — 3.0MM NEURO (MATCH HEAD) LESS AGGRESSIVE

## (undated) DEVICE — DRP MICROSCOPE 4 BINOCULAR CV 54X150IN

## (undated) DEVICE — DRSNG SURESITE WNDW 4X4.5

## (undated) DEVICE — DRSNG WND GZ PAD BORDERED 4X8IN STRL

## (undated) DEVICE — 3M™ STERI-DRAPE™ INSTRUMENT POUCH 1018: Brand: STERI-DRAPE™

## (undated) DEVICE — TOOL MR8-14BA50C MR8 14CM BALL CARB 5MM: Brand: MIDAS REX MR8

## (undated) DEVICE — CONN TBG Y 5 IN 1 LF STRL

## (undated) DEVICE — TOOL MR8-14MH30 MR8 14CM MATCH 3MM: Brand: MIDAS REX MR8

## (undated) DEVICE — APPL CHLORAPREP HI/LITE 26ML ORNG

## (undated) DEVICE — PK ATS CUST W CARDIOTOMY RESEVOIR

## (undated) DEVICE — GLV SURG BIOGEL LTX PF 6 1/2